# Patient Record
Sex: FEMALE | Race: WHITE | NOT HISPANIC OR LATINO | Employment: FULL TIME | URBAN - METROPOLITAN AREA
[De-identification: names, ages, dates, MRNs, and addresses within clinical notes are randomized per-mention and may not be internally consistent; named-entity substitution may affect disease eponyms.]

---

## 2017-01-24 RX ORDER — GABAPENTIN 300 MG/1
CAPSULE ORAL
Qty: 240 CAPSULE | Refills: 2 | Status: SHIPPED | OUTPATIENT
Start: 2017-01-24 | End: 2017-03-24 | Stop reason: DRUGHIGH

## 2017-03-24 RX ORDER — GABAPENTIN 600 MG/1
600 TABLET ORAL 4 TIMES DAILY
Qty: 120 TABLET | Refills: 5 | Status: SHIPPED | OUTPATIENT
Start: 2017-03-24 | End: 2017-05-30 | Stop reason: SDUPTHER

## 2017-03-24 RX ORDER — SUMATRIPTAN 6 MG/.5ML
6 INJECTION, SOLUTION SUBCUTANEOUS ONCE AS NEEDED
Qty: 0.5 ML | Refills: 2 | Status: SHIPPED | OUTPATIENT
Start: 2017-03-24 | End: 2018-06-14 | Stop reason: SDUPTHER

## 2017-03-24 RX ORDER — TOPIRAMATE 50 MG/1
50 TABLET, FILM COATED ORAL 2 TIMES DAILY
Qty: 60 TABLET | Refills: 2 | Status: SHIPPED | OUTPATIENT
Start: 2017-03-24 | End: 2017-05-30 | Stop reason: SDUPTHER

## 2017-05-24 RX ORDER — CEFUROXIME AXETIL 250 MG/1
TABLET ORAL
Qty: 0.5 ML | Refills: 0 | Status: SHIPPED | OUTPATIENT
Start: 2017-05-24 | End: 2018-06-14 | Stop reason: SDUPTHER

## 2017-05-30 ENCOUNTER — OFFICE VISIT (OUTPATIENT)
Dept: NEUROLOGY | Facility: CLINIC | Age: 42
End: 2017-05-30

## 2017-05-30 VITALS
DIASTOLIC BLOOD PRESSURE: 72 MMHG | WEIGHT: 164 LBS | RESPIRATION RATE: 18 BRPM | SYSTOLIC BLOOD PRESSURE: 118 MMHG | BODY MASS INDEX: 32.2 KG/M2 | HEIGHT: 60 IN

## 2017-05-30 DIAGNOSIS — G43.019 COMMON MIGRAINE WITH INTRACTABLE MIGRAINE: Primary | ICD-10-CM

## 2017-05-30 PROCEDURE — 99214 OFFICE O/P EST MOD 30 MIN: CPT | Performed by: PSYCHIATRY & NEUROLOGY

## 2017-05-30 RX ORDER — TOPIRAMATE 50 MG/1
50 TABLET, FILM COATED ORAL 2 TIMES DAILY
Qty: 180 TABLET | Refills: 3 | Status: SHIPPED | OUTPATIENT
Start: 2017-05-30 | End: 2018-06-08 | Stop reason: SDUPTHER

## 2017-05-30 RX ORDER — GABAPENTIN 600 MG/1
TABLET ORAL
Qty: 360 TABLET | Refills: 3 | Status: SHIPPED | OUTPATIENT
Start: 2017-05-30 | End: 2017-12-22 | Stop reason: SDUPTHER

## 2017-05-30 RX ORDER — SUMATRIPTAN 100 MG/1
TABLET, FILM COATED ORAL
Qty: 9 TABLET | Refills: 11 | Status: SHIPPED | OUTPATIENT
Start: 2017-05-30 | End: 2018-06-04 | Stop reason: SDUPTHER

## 2017-08-24 ENCOUNTER — TELEPHONE (OUTPATIENT)
Dept: NEUROLOGY | Facility: CLINIC | Age: 42
End: 2017-08-24

## 2017-08-24 NOTE — TELEPHONE ENCOUNTER
----- Message from Reshma Alonzo MD sent at 8/23/2017  4:30 PM EDT -----  Contact: JESUS  Accidentally routed letter for pt to [place]. Pls check that it goes out -- thanks.  ----- Message -----     From: Renee Chen MA     Sent: 8/22/2017   1:12 PM       To: Reshma Alonzo MD        ----- Message -----     From: Herminia Mann     Sent: 8/22/2017  11:17 AM       To: [facility]    OSCAR    Shannon called to see if Dr. Alonzo could write a letter indicating that a stand up desk would be better for her migraines in the fact that it helps her posture, etc.  She said it doesn't have to be elaborate or specific.    Please call her to confirm. [phone]   ______________________________________________________     I spoke with pt. Informed her that I mailed letter to her [place] address on file.   -TMT 8/24/17

## 2017-12-22 RX ORDER — GABAPENTIN 600 MG/1
TABLET ORAL
Qty: 360 TABLET | Refills: 1 | OUTPATIENT
Start: 2017-12-22 | End: 2018-06-14 | Stop reason: SDUPTHER

## 2018-06-04 RX ORDER — SUMATRIPTAN 100 MG/1
TABLET, FILM COATED ORAL
Qty: 9 TABLET | Refills: 0 | Status: SHIPPED | OUTPATIENT
Start: 2018-06-04 | End: 2018-06-14 | Stop reason: SDUPTHER

## 2018-06-08 RX ORDER — TOPIRAMATE 50 MG/1
TABLET, FILM COATED ORAL
Qty: 180 TABLET | Refills: 3 | Status: SHIPPED | OUTPATIENT
Start: 2018-06-08 | End: 2018-06-14 | Stop reason: SDUPTHER

## 2018-06-14 ENCOUNTER — OFFICE VISIT (OUTPATIENT)
Dept: NEUROLOGY | Facility: CLINIC | Age: 43
End: 2018-06-14

## 2018-06-14 VITALS
HEIGHT: 60 IN | SYSTOLIC BLOOD PRESSURE: 124 MMHG | DIASTOLIC BLOOD PRESSURE: 76 MMHG | WEIGHT: 145 LBS | BODY MASS INDEX: 28.47 KG/M2

## 2018-06-14 DIAGNOSIS — G43.719 INTRACTABLE CHRONIC MIGRAINE WITHOUT AURA AND WITHOUT STATUS MIGRAINOSUS: Primary | ICD-10-CM

## 2018-06-14 PROCEDURE — 99213 OFFICE O/P EST LOW 20 MIN: CPT | Performed by: PSYCHIATRY & NEUROLOGY

## 2018-06-14 RX ORDER — GABAPENTIN 600 MG/1
1200 TABLET ORAL 2 TIMES DAILY
Qty: 360 TABLET | Refills: 1 | OUTPATIENT
Start: 2018-06-14 | End: 2018-11-27 | Stop reason: SDUPTHER

## 2018-06-14 RX ORDER — TOPIRAMATE 50 MG/1
50 TABLET, FILM COATED ORAL 2 TIMES DAILY
Qty: 180 TABLET | Refills: 3 | Status: SHIPPED | OUTPATIENT
Start: 2018-06-14 | End: 2019-02-22 | Stop reason: SDUPTHER

## 2018-06-14 RX ORDER — SUMATRIPTAN 6 MG/.5ML
6 INJECTION, SOLUTION SUBCUTANEOUS ONCE AS NEEDED
Qty: 0.5 ML | Refills: 2 | Status: SHIPPED | OUTPATIENT
Start: 2018-06-14 | End: 2019-12-20 | Stop reason: SDUPTHER

## 2018-06-14 RX ORDER — SUMATRIPTAN 100 MG/1
TABLET, FILM COATED ORAL
Qty: 9 TABLET | Refills: 11 | Status: SHIPPED | OUTPATIENT
Start: 2018-06-14 | End: 2018-11-27 | Stop reason: SDUPTHER

## 2018-06-14 NOTE — PROGRESS NOTES
Subjective   Molly Ferguson is a 43 y.o. female.     Chief Complaint   Patient presents with   • Common migraine with intractable migraine       History of Present Illness     Pt originally seen 4/14 for headaches. Started after second child, severe pain on left side, primarily periorbital. Has P/P, sometimes N/V, and somewhat helped by sleep, ice pack and quiet. Some a/w menstrual periods. Takes ibuprofen for individual headache.  No aura. Were 2-3/week. Became incapacitating. Developed blurry vision in right eye, not coinciding with headaches.  Previous prophylactic medication has included TPM 75mg bid, used to help, less over time and tried Inderal LA 80 mg. Latter was no help. Added TPM back in and no better.  Maxalt was no help. Bite guard not helpful.  Brain MRI normal.  Started amitriptyline, initially helpful, then less so, changed to Fetzima, initially helpful, but N/V when decreased effectiveness and bumped to 80mg; then GBP 300mg tabs.  Then reported decrease to 2-3 headaches/week. On GBP 600mg qAM and 1200mg qHS as well as TPM 50mg BID and tolerating it well. She takes Imitrex around twice a week and this is helpful as well.   10/16: GBP increased to 900/1200, and migraines down to 1 or 2 a week. They were less frequent than that, but now picking up again with stress. Tolerating the medication without trouble. imitrex injections helpful. Has done ok with steroids in the past.  5/17: had hysterectomy, but didn't have to have oophorectomy. Then had the flu, then 6 day migraines. Now usually just having 1-2 HAs around days of cycle.  Not getting both imitrex pills and injections without a PA, so has no pills currently. Feels the combination of TPM 50mg bid and GBP 2x 600mg bid works well.   Today: continues to have migraines clustered once/month, suspects time of periods, but some random ones as well. Last really bad/incapacitating migraine about 2 mos ago. No change in character. If starts on left, knows  "it is a migraine, but \"regular\" headache can also turn into migraine. Has no injections because requires PA to get both sumatriptan pill and injection. No change in TPM and GBP, tolerated well.   Had enlarged groin LN in January, had CT, abx.     Allergies   Allergen Reactions   • Codeine    • Penicillins        Current Outpatient Prescriptions on File Prior to Visit   Medication Sig Dispense Refill   • DULoxetine (CYMBALTA) 60 MG capsule   0   • [DISCONTINUED] gabapentin (NEURONTIN) 600 MG tablet take 2 tablets by mouth twice a day 360 tablet 1   • [DISCONTINUED] SUMAtriptan (IMITREX) 100 MG tablet take 1 tablet by mouth AT ONSET OF MIGRAINE, may repeat ONCE 9 tablet 0   • [DISCONTINUED] topiramate (TOPAMAX) 50 MG tablet take 1 tablet by mouth twice a day 180 tablet 3   • [DISCONTINUED] FLUoxetine (PROzac) 40 MG capsule Take by mouth daily     • [DISCONTINUED] simvastatin (ZOCOR) 20 MG tablet Take by mouth daily     • [DISCONTINUED] SUMAtriptan (IMITREX) 6 MG/0.5ML solution injection Inject 0.5 mL under the skin 1 (One) Time As Needed for migraine for up to 5 doses. 0.5 mL 2   • [DISCONTINUED] SUMAtriptan Succinate 6 MG/0.5ML solution auto-injector inject 1 dose subcutaneously if needed for migraines may repeat in 1 hour IF migraines PERSISTS UP TO 5 DOSES 0.5 mL 0     No current facility-administered medications on file prior to visit.        Past Medical History:   Diagnosis Date   • History of anxiety    • History of hyperlipidemia    • Migraines    • Visual disturbances        Past Surgical History:   Procedure Laterality Date   • HYSTERECTOMY     • TUBAL ABDOMINAL LIGATION         Social History     Social History   • Marital status:      Spouse name: N/A   • Number of children: N/A   • Years of education: N/A     Occupational History   • Not on file.     Social History Main Topics   • Smoking status: Former Smoker   • Smokeless tobacco: Not on file   • Alcohol use Yes      Comment: socially   • Drug " "use: Unknown   • Sexual activity: Not on file     Other Topics Concern   • Not on file     Social History Narrative   • No narrative on file       Review of Systems   Constitutional: Negative for fever and unexpected weight change.        Has intentionally lost 20 lb, eating well and exercising   Respiratory: Negative for cough and shortness of breath.    Cardiovascular: Negative for chest pain.   Neurological: Positive for headaches. Negative for weakness.       Objective   Blood pressure 124/76, height 152.4 cm (60\"), weight 65.8 kg (145 lb).    Physical Exam   Constitutional: She is oriented to person, place, and time. She appears well-developed and well-nourished.   HENT:   Head: Normocephalic and atraumatic.   Eyes: EOM are normal. Pupils are equal, round, and reactive to light.   Pulmonary/Chest: Effort normal.   Neurological: She is oriented to person, place, and time. She has a normal Finger-Nose-Finger Test and a normal Heel to Shin Test. Gait normal.   Skin: Skin is warm and dry.   Psychiatric: She has a normal mood and affect. Her speech is normal and behavior is normal. Judgment and thought content normal.   Nursing note and vitals reviewed.      Neurologic Exam     Mental Status   Oriented to person, place, and time.   Speech: speech is normal   Level of consciousness: alert  Knowledge: consistent with education.   Able to name object. Normal comprehension.     Cranial Nerves     CN II   Visual fields full to confrontation.     CN III, IV, VI   Pupils are equal, round, and reactive to light.  Extraocular motions are normal.     CN VII   Facial expression full, symmetric.     CN IX, X   CN IX normal.   CN X normal.   Palate: symmetric    CN XI   CN XI normal.     CN XII   CN XII normal.     Motor Exam   Muscle bulk: normal  Right arm pronator drift: absent  Left arm pronator drift: absent    Strength   Strength 5/5 except as noted.     Sensory Exam   Light touch normal.     Gait, Coordination, and " Reflexes     Gait  Gait: normal    Coordination   Finger to nose coordination: normal  Heel to shin coordination: normal    Tremor   Resting tremor: absent  Intention tremor: absent  Action tremor: absent      Assessment/Plan     Molly was seen today for common migraine with intractable migraine.    Diagnoses and all orders for this visit:    Intractable chronic migraine without aura and without status migrainosus    Other orders  -     SUMAtriptan (IMITREX) 6 MG/0.5ML solution injection; Inject 0.5 mL under the skin 1 (One) Time As Needed for Migraine for up to 5 doses.  -     SUMAtriptan (IMITREX) 100 MG tablet; take 1 tablet by mouth AT ONSET OF MIGRAINE, may repeat ONCE  -     topiramate (TOPAMAX) 50 MG tablet; Take 1 tablet by mouth 2 (Two) Times a Day.  -     gabapentin (NEURONTIN) 600 MG tablet; Take 2 tablets by mouth 2 (Two) Times a Day.      Discussion/Summary:  Migraines remain intractable, but under better control than in past, and meds well tolerated. Discussed prognosis, headache classification, medications. Will plan PA to get injection approved, given intermittent incapacitating migraines that do not respond to med, or are full blown before she can take tablet.  Return in about 1 year (around 6/14/2019).

## 2018-06-15 RX ORDER — GABAPENTIN 600 MG/1
TABLET ORAL
Qty: 360 TABLET | Refills: 1 | OUTPATIENT
Start: 2018-06-15

## 2018-11-05 ENCOUNTER — TELEPHONE (OUTPATIENT)
Dept: NEUROLOGY | Facility: CLINIC | Age: 43
End: 2018-11-05

## 2018-11-05 NOTE — TELEPHONE ENCOUNTER
----- Message from Elder Avila sent at 11/5/2018 11:22 AM EST -----  Contact: PT  OSCAR:    MIGRAINES HAVE BECOME VERY FREQUENT AND WANTS TO HAVE SOME DIRECTION ON HOW TO HANDLE OR WHAT TO DO. HAS HAD TO MISS WORK DUE TO SEVERITY.    CALLBACK:[phone]

## 2018-11-06 NOTE — TELEPHONE ENCOUNTER
Probably needs to come in to discuss since it's been almost 6 mos since last seen -- can see me or anyone else in office if they are OK with it (eg Dr Del Valle if he has room, or Karla).

## 2018-11-07 NOTE — TELEPHONE ENCOUNTER
CHRISTINM for pt notifying her of Dr. Alonzo's instruction, to call back and schedule follow up appointment.

## 2018-11-28 RX ORDER — SUMATRIPTAN 100 MG/1
100 TABLET, FILM COATED ORAL ONCE AS NEEDED
Qty: 9 TABLET | Refills: 0 | Status: SHIPPED | OUTPATIENT
Start: 2018-11-28 | End: 2018-12-26 | Stop reason: SDUPTHER

## 2018-11-28 RX ORDER — GABAPENTIN 600 MG/1
1200 TABLET ORAL 2 TIMES DAILY
Qty: 360 TABLET | Refills: 1 | OUTPATIENT
Start: 2018-11-28 | End: 2019-02-22 | Stop reason: SDUPTHER

## 2018-12-26 ENCOUNTER — TELEPHONE (OUTPATIENT)
Dept: NEUROLOGY | Facility: CLINIC | Age: 43
End: 2018-12-26

## 2018-12-26 NOTE — TELEPHONE ENCOUNTER
Called script in as should have been called in on 11/27/18. Spoke to pharmacy and they never received . Advised patient that Gabapentin 600 had been called in

## 2018-12-26 NOTE — TELEPHONE ENCOUNTER
----- Message from Elder Ochoa Rep sent at 12/26/2018  9:25 AM EST -----  [place]     PT called to request refill on medication: gabapentin (NEURONTIN) 600 MG tablet [[id number]] (Order [id number]).  PT [facility]: [facility], [place] - [address] [facility] [address] - [phone] [facility] [phone] FX.    Please be aware that PT will be completely out of the medication in a few days.     Please call [place] back:  [phone]

## 2018-12-27 RX ORDER — SUMATRIPTAN 100 MG/1
TABLET, FILM COATED ORAL
Qty: 9 TABLET | Refills: 0 | Status: SHIPPED | OUTPATIENT
Start: 2018-12-27 | End: 2019-01-23 | Stop reason: SDUPTHER

## 2019-01-23 RX ORDER — SUMATRIPTAN 100 MG/1
TABLET, FILM COATED ORAL
Qty: 9 TABLET | Refills: 0 | Status: SHIPPED | OUTPATIENT
Start: 2019-01-23 | End: 2019-02-14 | Stop reason: SDUPTHER

## 2019-02-14 RX ORDER — SUMATRIPTAN 100 MG/1
TABLET, FILM COATED ORAL
Qty: 9 TABLET | Refills: 0 | Status: SHIPPED | OUTPATIENT
Start: 2019-02-14 | End: 2019-02-22 | Stop reason: SDUPTHER

## 2019-02-22 ENCOUNTER — OFFICE VISIT (OUTPATIENT)
Dept: NEUROLOGY | Facility: CLINIC | Age: 44
End: 2019-02-22

## 2019-02-22 VITALS
SYSTOLIC BLOOD PRESSURE: 132 MMHG | BODY MASS INDEX: 28.7 KG/M2 | HEIGHT: 60 IN | DIASTOLIC BLOOD PRESSURE: 76 MMHG | WEIGHT: 146.2 LBS

## 2019-02-22 DIAGNOSIS — G43.719 INTRACTABLE CHRONIC MIGRAINE WITHOUT AURA AND WITHOUT STATUS MIGRAINOSUS: Primary | ICD-10-CM

## 2019-02-22 PROCEDURE — 99213 OFFICE O/P EST LOW 20 MIN: CPT | Performed by: PSYCHIATRY & NEUROLOGY

## 2019-02-22 RX ORDER — TOPIRAMATE 50 MG/1
50 TABLET, FILM COATED ORAL 2 TIMES DAILY
Qty: 180 TABLET | Refills: 1 | Status: SHIPPED | OUTPATIENT
Start: 2019-02-22 | End: 2019-07-03

## 2019-02-22 RX ORDER — DEXAMETHASONE 1 MG
1 TABLET ORAL 2 TIMES DAILY WITH MEALS
Qty: 4 TABLET | Refills: 0 | Status: SHIPPED | OUTPATIENT
Start: 2019-02-22 | End: 2019-02-24

## 2019-02-22 RX ORDER — GABAPENTIN 600 MG/1
1200 TABLET ORAL 2 TIMES DAILY
Qty: 360 TABLET | Refills: 1 | OUTPATIENT
Start: 2019-02-22 | End: 2019-07-01 | Stop reason: SDUPTHER

## 2019-02-22 RX ORDER — SUMATRIPTAN 100 MG/1
100 TABLET, FILM COATED ORAL ONCE AS NEEDED
Qty: 9 TABLET | Refills: 3 | Status: SHIPPED | OUTPATIENT
Start: 2019-02-22 | End: 2019-05-06 | Stop reason: SDUPTHER

## 2019-02-22 RX ORDER — DEXAMETHASONE 1.5 MG/1
1.5 TABLET ORAL 2 TIMES DAILY WITH MEALS
Qty: 4 TABLET | Refills: 0 | Status: SHIPPED | OUTPATIENT
Start: 2019-02-22 | End: 2019-02-24

## 2019-02-22 RX ORDER — DEXAMETHASONE 1 MG
0.5 TABLET ORAL 2 TIMES DAILY WITH MEALS
Qty: 2 TABLET | Refills: 0 | Status: SHIPPED | OUTPATIENT
Start: 2019-02-22 | End: 2019-02-24

## 2019-02-22 NOTE — PROGRESS NOTES
"Subjective:    CC: Molly Ferguson is seen today in consultation at the request of No ref. provider found for Migraine       HPI:  ***    The following portions of the patient's history were reviewed today and updated as of 02/22/2019  : {history reviewed:10808::\"allergies\",\"social history\",\"problem list\"}.  This document will be scanned to patient's chart.      Current Outpatient Medications:   •  DULoxetine (CYMBALTA) 60 MG capsule, , Disp: , Rfl: 0  •  gabapentin (NEURONTIN) 600 MG tablet, Take 2 tablets by mouth 2 (Two) Times a Day., Disp: 360 tablet, Rfl: 1  •  SUMAtriptan (IMITREX) 100 MG tablet, TAKE ONE TABLET BY MOUTH AT ONSET OF HEADACHE; MAY REPEAT ONE TABLET IN 2 HOURS IF NEEDED. MAX OF 200MG A DAY, Disp: 9 tablet, Rfl: 0  •  SUMAtriptan (IMITREX) 6 MG/0.5ML solution injection, Inject 0.5 mL under the skin 1 (One) Time As Needed for Migraine for up to 5 doses., Disp: 0.5 mL, Rfl: 2  •  topiramate (TOPAMAX) 50 MG tablet, Take 1 tablet by mouth 2 (Two) Times a Day., Disp: 180 tablet, Rfl: 3   Past Medical History:   Diagnosis Date   • History of anxiety    • History of hyperlipidemia    • Migraines    • Visual disturbances       Past Surgical History:   Procedure Laterality Date   • HYSTERECTOMY     • TUBAL ABDOMINAL LIGATION        Family History   Problem Relation Age of Onset   • Hypertension Father    • Heart disease Father    • Diabetes Father    • Hypertension Maternal Grandmother    • Breast cancer Maternal Grandmother    • Alzheimer's disease Other    • Parkinsonism Other         Parkinsons Disease   • Heart disease Other         Congenital   • Cancer Other       Review of Systems   Constitutional: Positive for activity change, fatigue, unexpected weight gain and unexpected weight loss.   Musculoskeletal: Positive for neck pain and neck stiffness.   Neurological: Positive for headache.   Psychiatric/Behavioral: Positive for sleep disturbance. The patient is nervous/anxious.        All other systems " reviewed and are negative     Objective:    There were no vitals taken for this visit.    Neurology Exam:    General apperance: NAD.     Mental status: Alert, awake and oriented to time place and person.    Recent and Remote memory: Can recall 3/3 objects at 5 minutes. Can recall historical events.     Attention span and Concentration: Serial 7s: Normal.     Fund of knowledge:  Normal.     Language and Speech: No aphasia or dysarthria.    Naming , Repitition and Comprehension:  Can name objects, repeat a sentence and follow commands. Speech is clear and fluent with good repetition, comprehension, and naming.    Cranial Nerves:   CN II: Visual fields are full. Intact. Fundi - Normal, No papillederma, Pupils - MARA  CN III, IV and VI: Extraocular movements are intact. Normal saccades.   CN V: Facial sensation is intact.   CN VII: Muscles of facial expression reveal no asymmetry. Intact.   CN VIII: Hearing is intact. Whispered voice intact.   CN IX and X: Palate elevates symmetrically. Intact  CN XI: Shoulder shrug is intact.   CN XII: Tongue is midline without evidence of atrophy or fasciculation.     Motor:  Right UE muscle strength 5/5. Normal tone.     Left UE muscle strength 5/5. Normal tone.      Right LE muscle strength5/5. Normal tone.     Left LE muscle strength 5/5. Normal tone.      Sensory: Normal light touch, vibration and pinprick sensation bilaterally.    DTRs: 2+ bilaterally in upper and lower extremities.    Babinski: Negative bilaterally.    Co-ordination: Normal finger-to-nose, heel to shin B/L.    Rhomberg: Negative.    Gait: Normal.    Cardiovascular: Regular rate and rhythm without murmur, gallop or rub.    Assessment and Plan:  There are no diagnoses linked to this encounter.     No Follow-up on file.     Praneeth Childers MA

## 2019-02-22 NOTE — PROGRESS NOTES
Subjective:    CC: Molly Ferguson is in clinic today for follow up for intractable chronic migraines.    HPI:      Current visit: Patient is in clinic for regular follow-up.  She is a patient of Dr. Alonzo and is being followed for intractable chronic migraines.  She saw Dr. Alonzo in June 2018 and at that time she was experiencing about 6-8 migraines in a month.  Couple of months later, she reports that the headache frequency and intensity have become worse.  She reports that currently she is experiencing approximately 15-20 headaches in a month.  She reports that in the last 6 days, she has had headache almost on a daily basis.  She has been taking ibuprofen 600-800 mg almost on a daily basis for last 4-5 weeks.  Typically migraine involves left frontotemporal region associated with light and sound sensitivity as well as nausea and vomiting.  She reports pain quality is dull throbbing type of pain with maximum pain intensity being 6-7 out of 10.  Currently, she is on Topamax 50 mg twice daily, gabapentin 600 mg twice daily and Cymbalta 60 mg daily.  This combination has not helped.  She has also failed amitriptyline and propranolol in the past.    Past history: Pt originally seen 4/14 for headaches. Started after second child, severe pain on left side, primarily periorbital. Has P/P, sometimes N/V, and somewhat helped by sleep, ice pack and quiet. Some a/w menstrual periods. Takes ibuprofen for individual headache.  No aura. Were 2-3/week. Became incapacitating. Developed blurry vision in right eye, not coinciding with headaches.  Previous prophylactic medication has included TPM 75mg bid, used to help, less over time and tried Inderal LA 80 mg. Latter was no help. Added TPM back in and no better.  Maxalt was no help. Bite guard not helpful.  Brain MRI normal.  Started amitriptyline, initially helpful, then less so, changed to Fetzima, initially helpful, but N/V when decreased effectiveness and bumped to  "80mg; then GBP 300mg tabs.  Then reported decrease to 2-3 headaches/week. On GBP 600mg qAM and 1200mg qHS as well as TPM 50mg BID and tolerating it well. She takes Imitrex around twice a week and this is helpful as well.   10/16: GBP increased to 900/1200, and migraines down to 1 or 2 a week. They were less frequent than that, but now picking up again with stress. Tolerating the medication without trouble. imitrex injections helpful. Has done ok with steroids in the past.  5/17: had hysterectomy, but didn't have to have oophorectomy. Then had the flu, then 6 day migraines. Now usually just having 1-2 HAs around days of cycle.  Not getting both imitrex pills and injections without a PA, so has no pills currently. Feels the combination of TPM 50mg bid and GBP 2x 600mg bid works well.   6/18: continues to have migraines clustered once/month, suspects time of periods, but some random ones as well. Last really bad/incapacitating migraine about 2 mos ago. No change in character. If starts on left, knows it is a migraine, but \"regular\" headache can also turn into migraine. Has no injections because requires PA to get both sumatriptan pill and injection. No change in TPM and GBP, tolerated well.   Had enlarged groin LN in January, had CT, abx.     The following portions of the patient's history were reviewed and updated as of 02/22/2019: allergies, social history and problem list.       Current Outpatient Medications:   •  gabapentin (NEURONTIN) 600 MG tablet, Take 2 tablets by mouth 2 (Two) Times a Day., Disp: 360 tablet, Rfl: 1  •  SUMAtriptan (IMITREX) 100 MG tablet, TAKE ONE TABLET BY MOUTH AT ONSET OF HEADACHE; MAY REPEAT ONE TABLET IN 2 HOURS IF NEEDED. MAX OF 200MG A DAY, Disp: 9 tablet, Rfl: 0  •  SUMAtriptan (IMITREX) 6 MG/0.5ML solution injection, Inject 0.5 mL under the skin 1 (One) Time As Needed for Migraine for up to 5 doses., Disp: 0.5 mL, Rfl: 2  •  topiramate (TOPAMAX) 50 MG tablet, Take 1 tablet by mouth 2 " "(Two) Times a Day., Disp: 180 tablet, Rfl: 3  •  Vortioxetine HBr (TRINTELLIX) 20 MG tablet, Take  by mouth., Disp: , Rfl:   •  DULoxetine (CYMBALTA) 60 MG capsule, , Disp: , Rfl: 0   Past Medical History:   Diagnosis Date   • History of anxiety    • History of hyperlipidemia    • Migraines    • Visual disturbances       Past Surgical History:   Procedure Laterality Date   • HYSTERECTOMY     • TUBAL ABDOMINAL LIGATION        Family History   Problem Relation Age of Onset   • Hypertension Father    • Heart disease Father    • Diabetes Father    • Hypertension Maternal Grandmother    • Breast cancer Maternal Grandmother    • Alzheimer's disease Other    • Parkinsonism Other         Parkinsons Disease   • Heart disease Other         Congenital   • Cancer Other         Review of Systems   Neurological: Positive for headache.     Objective:    /76   Ht 152.4 cm (60\")   Wt 66.3 kg (146 lb 3.2 oz)   BMI 28.55 kg/m²     Neurology Exam:  General apperance: NAD.     Mental status: Alert, awake and oriented to time place and person.    Recent and Remote memory: Can recall 3/3 objects at 5 minutes. Can recall historical events.     Attention span and Concentration: Serial 7s: Normal.     Fund of knowledge:  Normal.     Language and Speech: No aphasia or dysarthria.    Naming , Repitition and Comprehension:  Can name objects, repeat a sentence and follow commands. Speech is clear and fluent with good repetition, comprehension, and naming.    CN II to XII: Intact.    Opthalmoscopic Exam: No papilledema.    Motor:  Right UE muscle strength 5/5. Normal tone.     Left UE muscle strength 5/5. Normal tone.      Right LE muscle strength5/5. Normal tone.     Left LE muscle strength 5/5. Normal tone.      Sensory: Normal light touch, vibration and pinprick sensation bilaterally.    DTRs: 2+ bilaterally.    Babinski: Negative bilaterally.    Co-ordination: Normal finger-to-nose, heel to shin B/L.    Rhomberg: Negative.    Gait: " Normal.    Cardiovascular: Regular rate and rhythm without murmur, gallop or rub.    Assessment and Plan:  1. Intractable chronic migraine without aura and without status migrainosus  She is reporting increasing severity and frequency of migraine headaches in last few weeks.  She has likely developed rebound headaches in addition to migraines caused by overuse of ibuprofen.  I will be prescribing her dexamethasone and a tapering dose to break the cycle of headaches.  Current headache frequency is approximately 15-20 headache days in a month.  Have advised her not to use ibuprofen or Imitrex more than twice a week.  Since she has failed multiple migraine preventative therapies including Neurontin, Topamax, amitriptyline, propranolol and Cymbalta, I will be starting her on Aimovig 140 mg subcutaneous injection to be taken every month as a preventative therapy.  First injection of 140 mg Aimovig was given today in the clinic ( free sample).  If she starts responding to Aimovig then Topamax and gabapentin will be slowly tapered off in the next visits.  I will see her back in 4 weeks in follow-up.       I spent 15 minutes face to face with the patient and spent 10 minutes of this time counseling and discussing about taking medication regularly, possible side effects with medication use, importance of good sleep hygiene, good hydration and regular exercise.    No Follow-up on file.

## 2019-02-25 ENCOUNTER — TELEPHONE (OUTPATIENT)
Dept: NEUROLOGY | Facility: CLINIC | Age: 44
End: 2019-02-25

## 2019-02-25 NOTE — TELEPHONE ENCOUNTER
Patient has not been able to use the co-pay card for Aimovig yet. Advised pt that Insurance has denied it, but she should be able to use the co-pay card for up to 12 months with it.  Advised pt to give it a try and then give me a call back.    Per insurance Aimovig is only covered when pt treatment cannot be switched to another miragaine treatment ( Ajovy or Emagilty.)

## 2019-03-28 ENCOUNTER — OFFICE VISIT (OUTPATIENT)
Dept: NEUROLOGY | Facility: CLINIC | Age: 44
End: 2019-03-28

## 2019-03-28 VITALS
WEIGHT: 147 LBS | HEIGHT: 60 IN | OXYGEN SATURATION: 98 % | HEART RATE: 68 BPM | DIASTOLIC BLOOD PRESSURE: 82 MMHG | SYSTOLIC BLOOD PRESSURE: 126 MMHG | BODY MASS INDEX: 28.86 KG/M2

## 2019-03-28 DIAGNOSIS — G43.719 INTRACTABLE CHRONIC MIGRAINE WITHOUT AURA AND WITHOUT STATUS MIGRAINOSUS: Primary | ICD-10-CM

## 2019-03-28 PROCEDURE — 99213 OFFICE O/P EST LOW 20 MIN: CPT | Performed by: PSYCHIATRY & NEUROLOGY

## 2019-03-28 NOTE — PROGRESS NOTES
Subjective:    CC: Molly Ferguson is in clinic today for follow up for intractable chronic migraines.    HPI:  She is in clinic for regular follow-up.  Since her last visit, she reports that she has had total of 2 injections of Aimovig and reports that she has done very well.  Headache intensity and frequency has improved significantly.  She has a total of 3 headaches since starting him awake for which she took Imitrex.  She does report constipation but overall she is extremely happy with the results with Aimovig use.  Before a more week, her headache frequency was 15-20 headache days in a month.  She is currently also on gabapentin and Topamax which was started for headache prevention as well.    The following portions of the patient's history were reviewed and updated as of 03/28/2019: allergies, social history and problem list.       Current Outpatient Medications:   •  DULoxetine (CYMBALTA) 60 MG capsule, , Disp: , Rfl: 0  •  gabapentin (NEURONTIN) 600 MG tablet, Take 2 tablets by mouth 2 (Two) Times a Day., Disp: 360 tablet, Rfl: 1  •  SUMAtriptan (IMITREX) 100 MG tablet, Take 1 tablet by mouth 1 (One) Time As Needed for Migraine for up to 1 dose. ., Disp: 9 tablet, Rfl: 3  •  SUMAtriptan (IMITREX) 6 MG/0.5ML solution injection, Inject 0.5 mL under the skin 1 (One) Time As Needed for Migraine for up to 5 doses., Disp: 0.5 mL, Rfl: 2  •  topiramate (TOPAMAX) 50 MG tablet, Take 1 tablet by mouth 2 (Two) Times a Day., Disp: 180 tablet, Rfl: 1  •  Vortioxetine HBr (TRINTELLIX) 20 MG tablet, Take  by mouth., Disp: , Rfl:    Past Medical History:   Diagnosis Date   • History of anxiety    • History of hyperlipidemia    • Migraines    • Visual disturbances       Past Surgical History:   Procedure Laterality Date   • HYSTERECTOMY     • TUBAL ABDOMINAL LIGATION        Family History   Problem Relation Age of Onset   • Hypertension Father    • Heart disease Father    • Diabetes Father    • Hypertension Maternal  "Grandmother    • Breast cancer Maternal Grandmother    • Alzheimer's disease Other    • Parkinsonism Other         Parkinsons Disease   • Heart disease Other         Congenital   • Cancer Other         Review of Systems   Gastrointestinal: Positive for constipation.   Neurological: Positive for weakness, light-headedness and headache.   All other systems reviewed and are negative.    Objective:    /82 (BP Location: Right arm, Patient Position: Sitting, Cuff Size: Adult)   Pulse 68   Ht 152.4 cm (60\")   Wt 66.7 kg (147 lb)   SpO2 98%   BMI 28.71 kg/m²     Neurology Exam:  General apperance: NAD.     Mental status: Alert, awake and oriented to time place and person.    Recent and Remote memory: Can recall 3/3 objects at 5 minutes. Can recall historical events.     Attention span and Concentration: Serial 7s: Normal.     Fund of knowledge:  Normal.     Language and Speech: No aphasia or dysarthria.    Naming , Repitition and Comprehension:  Can name objects, repeat a sentence and follow commands. Speech is clear and fluent with good repetition, comprehension, and naming.    CN II to XII: Intact.    Opthalmoscopic Exam: No papilledema.    Motor:  Right UE muscle strength 5/5. Normal tone.     Left UE muscle strength 5/5. Normal tone.      Right LE muscle strength5/5. Normal tone.     Left LE muscle strength 5/5. Normal tone.      Sensory: Normal light touch, vibration and pinprick sensation bilaterally.    DTRs: 2+ bilaterally.    Babinski: Negative bilaterally.    Co-ordination: Normal finger-to-nose, heel to shin B/L.    Rhomberg: Negative.    Gait: Normal.    Cardiovascular: Regular rate and rhythm without murmur, gallop or rub.    Assessment and Plan:  1. Intractable chronic migraine without aura and without status migrainosus  Doing very well with Aimovig 140 mg subcutaneous monthly injection.  She has a total of 3 headaches in last 1 month after the first injection.  She had a second injection last " week.  She does report constipation with Aimovig use.  She wants to continue with Topamax and gabapentin for now.  I will see her back in 3 months and at that time, either gabapentin or Topamax can be tapered off.    I spent 15 minutes face to face with the patient and spent 10 minutes of this time  in management, instructions and education regarding above mentioned diagnosis and also on counseling and discussing about taking medication regularly, possible side effects with medication use, importance of good sleep hygiene, good hydration and regular exercise.    Return in about 3 months (around 6/28/2019).

## 2019-05-07 RX ORDER — SUMATRIPTAN 100 MG/1
TABLET, FILM COATED ORAL
Qty: 9 TABLET | Refills: 0 | Status: SHIPPED | OUTPATIENT
Start: 2019-05-07 | End: 2019-06-07 | Stop reason: SDUPTHER

## 2019-05-10 ENCOUNTER — TELEPHONE (OUTPATIENT)
Dept: NEUROLOGY | Facility: CLINIC | Age: 44
End: 2019-05-10

## 2019-05-10 NOTE — TELEPHONE ENCOUNTER
Patient insurance would like her to try Ajovy and Emagilty. So we just need a RX for one of those for insurance purposes.

## 2019-05-20 ENCOUNTER — TELEPHONE (OUTPATIENT)
Dept: NEUROLOGY | Facility: CLINIC | Age: 44
End: 2019-05-20

## 2019-05-20 NOTE — TELEPHONE ENCOUNTER
I have sent orders for Ajovy 225 mg subcutaneous injection.  She can go to [email or web address] and see how to inject and if she is not comfortable doing it herself then she can always stop by our office and someone can do it for her.  She will take Ajovy next whenever she was due for her next Aimovig injection

## 2019-05-21 NOTE — TELEPHONE ENCOUNTER
Informed pt that Aimovig was denied by insurance and we have prescribed Ajovy. Patient acknowledged understanding. Pt will call if she needs any aisstance going forward

## 2019-06-06 ENCOUNTER — TELEPHONE (OUTPATIENT)
Dept: NEUROLOGY | Facility: CLINIC | Age: 44
End: 2019-06-06

## 2019-06-06 NOTE — TELEPHONE ENCOUNTER
As far as she is alternating Imitrex and ibuprofen then it should be okay.  Also many a times with second Ajovy injection, the effect improves and headaches do get better so tell  her to hang in there and hopefully headaches will come under control.

## 2019-06-06 NOTE — TELEPHONE ENCOUNTER
Patient was switched from Aimovig to Ajovy due to insurance purposes. Since then, patient states since her first Ajovy injection on 5/22, she has experienced 4 migraines in a week and is relying on Imitrex and Ibuprofen. I have advised pt that we will send in another PA for Aimovig and submitted this information, but I do have to wait 30 days and that will be on 6/10. Pt acknowledged understanding. Pt would like to know is there anything else she can take other than Imitrex and ibuprofen because she states you don't want her to rely on those.

## 2019-06-06 NOTE — TELEPHONE ENCOUNTER
Informed pt that as long as she is alternating Imitrex and ibuprofen then it should be okay.  Also many a times with second Ajovy injection, the effect improves and headaches do get better so tell  her to hang in there and hopefully headaches will come under control. Pt acknowledged understanding.

## 2019-06-07 RX ORDER — SUMATRIPTAN 100 MG/1
TABLET, FILM COATED ORAL
Qty: 9 TABLET | Refills: 0 | Status: SHIPPED | OUTPATIENT
Start: 2019-06-07 | End: 2019-07-08 | Stop reason: SDUPTHER

## 2019-07-01 RX ORDER — GABAPENTIN 600 MG/1
TABLET ORAL
Qty: 360 TABLET | Refills: 0 | Status: SHIPPED | OUTPATIENT
Start: 2019-07-01 | End: 2019-09-23 | Stop reason: SDUPTHER

## 2019-07-03 ENCOUNTER — OFFICE VISIT (OUTPATIENT)
Dept: NEUROLOGY | Facility: CLINIC | Age: 44
End: 2019-07-03

## 2019-07-03 VITALS
SYSTOLIC BLOOD PRESSURE: 124 MMHG | BODY MASS INDEX: 29.25 KG/M2 | WEIGHT: 149 LBS | DIASTOLIC BLOOD PRESSURE: 76 MMHG | HEIGHT: 60 IN

## 2019-07-03 DIAGNOSIS — G43.719 INTRACTABLE CHRONIC MIGRAINE WITHOUT AURA AND WITHOUT STATUS MIGRAINOSUS: Primary | ICD-10-CM

## 2019-07-03 PROCEDURE — 99214 OFFICE O/P EST MOD 30 MIN: CPT | Performed by: PSYCHIATRY & NEUROLOGY

## 2019-07-03 RX ORDER — TRAMADOL HYDROCHLORIDE 50 MG/1
TABLET ORAL
COMMUNITY
Start: 2019-06-11 | End: 2020-06-05

## 2019-07-03 RX ORDER — METHYLPREDNISOLONE 4 MG/1
TABLET ORAL
Qty: 1 EACH | Refills: 0 | Status: SHIPPED | OUTPATIENT
Start: 2019-07-03 | End: 2020-06-05

## 2019-07-03 RX ORDER — TOPIRAMATE 100 MG/1
100 CAPSULE, EXTENDED RELEASE ORAL DAILY
Qty: 30 CAPSULE | Refills: 3 | Status: SHIPPED | OUTPATIENT
Start: 2019-07-03 | End: 2019-08-16

## 2019-07-03 RX ORDER — TOPIRAMATE 50 MG/1
50 CAPSULE, EXTENDED RELEASE ORAL DAILY
Qty: 30 CAPSULE | Refills: 3 | Status: SHIPPED | OUTPATIENT
Start: 2019-07-03 | End: 2019-08-16

## 2019-07-03 NOTE — PROGRESS NOTES
Subjective:    CC: Molly Ferguson is in clinic today for follow up for chronic migraines.    HPI:  She is in clinic for regular follow-up.  Since her last visit, she reports that she has been taking Ajovy as Aimovig was denied.  She has done her last Ajovy injection on June 22.  It was a second injection.  She reports that it has not helped.   She reports that headache frequency and intensity has worsened while on Ajovy.  She is getting on an average 8-10 headaches in a month.  With Aimovig, headache had reduced significantly and she was getting only 1-2 mild headaches in a month.  She denies any side effects with Ajovy though.    The following portions of the patient's history were reviewed and updated as of 07/03/2019: allergies, social history and problem list.       Current Outpatient Medications:   •  DULoxetine (CYMBALTA) 60 MG capsule, , Disp: , Rfl: 0  •  Fremanezumab-vfrm 225 MG/1.5ML solution prefilled syringe, Inject 225 mg under the skin into the appropriate area as directed Every 30 (Thirty) Days., Disp: 1 syringe, Rfl: 11  •  gabapentin (NEURONTIN) 600 MG tablet, TAKE TWO TABLETS BY MOUTH TWICE A DAY, Disp: 360 tablet, Rfl: 0  •  SUMAtriptan (IMITREX) 100 MG tablet, TAKE ONE TABLET BY MOUTH AT ONSET OF HEADACHE; MAY REPEAT ONE TABLET IN 2 HOURS IF NEEDED. MAX OF 200MG A DAY, Disp: 9 tablet, Rfl: 0  •  SUMAtriptan (IMITREX) 6 MG/0.5ML solution injection, Inject 0.5 mL under the skin 1 (One) Time As Needed for Migraine for up to 5 doses., Disp: 0.5 mL, Rfl: 2  •  traMADol (ULTRAM) 50 MG tablet, , Disp: , Rfl:   •  Vortioxetine HBr (TRINTELLIX) 20 MG tablet, Take  by mouth., Disp: , Rfl:   •  methylPREDNISolone (MEDROL, BALTAZAR,) 4 MG tablet, Take as directed on package instructions., Disp: 1 each, Rfl: 0  •  Topiramate ER (TROKENDI XR) 100 MG capsule sustained-release 24 hr, Take 1 capsule by mouth Daily., Disp: 30 capsule, Rfl: 3  •  Topiramate ER (TROKENDI XR) 50 MG capsule sustained-release 24 hr, Take 1  "capsule by mouth Daily., Disp: 30 capsule, Rfl: 3   Past Medical History:   Diagnosis Date   • History of anxiety    • History of hyperlipidemia    • Migraines    • Visual disturbances       Past Surgical History:   Procedure Laterality Date   • HYSTERECTOMY     • TUBAL ABDOMINAL LIGATION        Family History   Problem Relation Age of Onset   • Hypertension Father    • Heart disease Father    • Diabetes Father    • Hypertension Maternal Grandmother    • Breast cancer Maternal Grandmother    • Alzheimer's disease Other    • Parkinsonism Other         Parkinsons Disease   • Heart disease Other         Congenital   • Cancer Other         Review of Systems   Gastrointestinal: Positive for constipation.   Neurological: Positive for headache.     Objective:    /76   Ht 152.4 cm (60\")   Wt 67.6 kg (149 lb)   BMI 29.10 kg/m²     Neurology Exam:  General apperance: NAD.     Mental status: Alert, awake and oriented to time place and person.    Recent and Remote memory: Can recall 3/3 objects at 5 minutes. Can recall historical events.     Attention span and Concentration: Serial 7s: Normal.     Fund of knowledge:  Normal.     Language and Speech: No aphasia or dysarthria.    Naming , Repitition and Comprehension:  Can name objects, repeat a sentence and follow commands. Speech is clear and fluent with good repetition, comprehension, and naming.    CN II to XII: Intact.    Opthalmoscopic Exam: No papilledema.    Motor:  Right UE muscle strength 5/5. Normal tone.     Left UE muscle strength 5/5. Normal tone.      Right LE muscle strength5/5. Normal tone.     Left LE muscle strength 5/5. Normal tone.      Sensory: Normal light touch, vibration and pinprick sensation bilaterally.    DTRs: 2+ bilaterally.    Babinski: Negative bilaterally.    Co-ordination: Normal finger-to-nose, heel to shin B/L.    Rhomberg: Negative.    Gait: Normal.    Cardiovascular: Regular rate and rhythm without murmur, gallop or " rub.    Assessment and Plan:  1. Intractable chronic migraine without aura and without status migrainosus  She responded very well to Aimovig but unfortunately insurance does not cover it.  Now she is on Ajovy and has done 2 injections so far.  She reports that headaches have become worse in intensity and frequency with Ajovy.  I will be prescribing her Medrol Dosepak to reduce the intensity and frequency of headaches and also will change Topamax 50 mg immediate release twice daily tablets to Trokendi  mg daily as immediate release has not worked well.  I have advised her to continue Ajovy for now as some patients have responded only after third injection.  I will see her back in clinic in 6 to 8 weeks for follow-up.       I spent 30 minutes face to face with the patient and spent 16 minutes of this time  in management, instructions and education regarding above mentioned diagnosis and also on counseling and discussing about taking medication regularly, possible side effects with medication use, importance of good sleep hygiene, good hydration and regular exercise.    Return in about 6 weeks (around 8/14/2019).

## 2019-07-08 RX ORDER — SUMATRIPTAN 100 MG/1
TABLET, FILM COATED ORAL
Qty: 9 TABLET | Refills: 0 | Status: SHIPPED | OUTPATIENT
Start: 2019-07-08 | End: 2019-11-27 | Stop reason: SDUPTHER

## 2019-08-16 ENCOUNTER — OFFICE VISIT (OUTPATIENT)
Dept: NEUROLOGY | Facility: CLINIC | Age: 44
End: 2019-08-16

## 2019-08-16 VITALS
SYSTOLIC BLOOD PRESSURE: 120 MMHG | DIASTOLIC BLOOD PRESSURE: 74 MMHG | HEIGHT: 60 IN | BODY MASS INDEX: 29.45 KG/M2 | WEIGHT: 150 LBS

## 2019-08-16 DIAGNOSIS — G43.719 INTRACTABLE CHRONIC MIGRAINE WITHOUT AURA AND WITHOUT STATUS MIGRAINOSUS: Primary | ICD-10-CM

## 2019-08-16 PROCEDURE — 99214 OFFICE O/P EST MOD 30 MIN: CPT | Performed by: PSYCHIATRY & NEUROLOGY

## 2019-08-16 RX ORDER — ONDANSETRON 4 MG/1
4 TABLET, FILM COATED ORAL AS NEEDED
Qty: 15 TABLET | Refills: 0 | Status: SHIPPED | OUTPATIENT
Start: 2019-08-16 | End: 2022-08-01

## 2019-08-16 NOTE — PROGRESS NOTES
Subjective:    CC: Molly Ferguson is in clinic today for follow up for chronic intractable migraines.    HPI:  She is in clinic for regular follow-up.  Since her last visit, she reports that she continues to have frequent and intense headaches.  She is reporting approximately 15-20 headache days.  She has done total of 4 Ajovy injections and reports that it has not helped.  The Mobic was working really well and the headache frequency had gone down to 2-3 in a month but unfortunately insurance is not covering it.  She reports that one headache was really intense and lasted for 88 to 10 hours associated with intense nausea and vomiting.  She has been taking Trokendi  mg daily as well.    The following portions of the patient's history were reviewed and updated as of 08/16/2019: allergies, social history and problem list.       Current Outpatient Medications:   •  DULoxetine (CYMBALTA) 60 MG capsule, , Disp: , Rfl: 0  •  gabapentin (NEURONTIN) 600 MG tablet, TAKE TWO TABLETS BY MOUTH TWICE A DAY, Disp: 360 tablet, Rfl: 0  •  methylPREDNISolone (MEDROL, BALTAZAR,) 4 MG tablet, Take as directed on package instructions., Disp: 1 each, Rfl: 0  •  SUMAtriptan (IMITREX) 100 MG tablet, TAKE ONE TABLET BY MOUTH AT ONSET OF HEADACHE; MAY REPEAT ONE TABLET IN 2 HOURS IF NEEDED. MAX OF 200MG A DAY, Disp: 9 tablet, Rfl: 0  •  SUMAtriptan (IMITREX) 6 MG/0.5ML solution injection, Inject 0.5 mL under the skin 1 (One) Time As Needed for Migraine for up to 5 doses., Disp: 0.5 mL, Rfl: 2  •  traMADol (ULTRAM) 50 MG tablet, , Disp: , Rfl:   •  Vortioxetine HBr (TRINTELLIX) 20 MG tablet, Take  by mouth., Disp: , Rfl:   •  galcanezumab-gnlm (EMGALITY) 120 MG/ML prefilled syringe, Inject 1 mL under the skin into the appropriate area as directed Every 30 (Thirty) Days., Disp: 1.12 mL, Rfl: 11  •  ondansetron (ZOFRAN) 4 MG tablet, Take 1 tablet by mouth As Needed for Nausea or Vomiting., Disp: 15 tablet, Rfl: 0  •  Topiramate ER (TROKENDI  "XR) 200 MG capsule sustained-release 24 hr, Take 200 mg by mouth Daily., Disp: 30 capsule, Rfl: 3   Past Medical History:   Diagnosis Date   • History of anxiety    • History of hyperlipidemia    • Migraines    • Visual disturbances       Past Surgical History:   Procedure Laterality Date   • HYSTERECTOMY     • TUBAL ABDOMINAL LIGATION        Family History   Problem Relation Age of Onset   • Hypertension Father    • Heart disease Father    • Diabetes Father    • Hypertension Maternal Grandmother    • Breast cancer Maternal Grandmother    • Alzheimer's disease Other    • Parkinsonism Other         Parkinsons Disease   • Heart disease Other         Congenital   • Cancer Other         Review of Systems   Constitutional: Positive for fatigue.   Neurological: Positive for headache.     Objective:    /74   Ht 152.4 cm (60\")   Wt 68 kg (150 lb)   BMI 29.29 kg/m²     Neurology Exam:  General apperance: NAD.     Mental status: Alert, awake and oriented to time place and person.    Recent and Remote memory: Can recall 3/3 objects at 5 minutes. Can recall historical events.     Attention span and Concentration: Serial 7s: Normal.     Fund of knowledge:  Normal.     Language and Speech: No aphasia or dysarthria.    Naming , Repitition and Comprehension:  Can name objects, repeat a sentence and follow commands. Speech is clear and fluent with good repetition, comprehension, and naming.    CN II to XII: Intact.    Opthalmoscopic Exam: No papilledema.    Motor:  Right UE muscle strength 5/5. Normal tone.     Left UE muscle strength 5/5. Normal tone.      Right LE muscle strength5/5. Normal tone.     Left LE muscle strength 5/5. Normal tone.      Sensory: Normal light touch, vibration and pinprick sensation bilaterally.    DTRs: 2+ bilaterally.    Babinski: Negative bilaterally.    Co-ordination: Normal finger-to-nose, heel to shin B/L.    Rhomberg: Negative.    Gait: Normal.    Cardiovascular: Regular rate and rhythm " without murmur, gallop or rub.    Assessment and Plan:  1. Intractable chronic migraine without aura and without status migrainosus  Ajovy has not helped.  She has done for a total of 4 injections but her headache intensity and frequency remains unchanged.  She did very well on Aimovig but insurance is unfortunately not covering it.  I will be starting her on Emgality 120 mg subcutaneous monthly injection as a preventative therapy.  She was given a sample of loading dose-240 mg today in clinic.  She is due for next injection in 30 days from today.  Will be increasing TROKENDI XR to 200 mg daily and have advised her to start taking Zofran 4 mg along with Imitrex for severe headaches to prevent his nausea and vomiting.  I will see her back in 6 weeks for follow-up.    I spent 25 minutes face to face with the patient and spent more than 50% of this time  in management, instructions and education regarding above mentioned diagnosis and also on counseling and discussing about taking medication regularly, possible side effects with medication use, importance of good sleep hygiene, good hydration and regular exercise.    Return in about 6 weeks (around 9/27/2019).

## 2019-09-23 RX ORDER — GABAPENTIN 600 MG/1
TABLET ORAL
Qty: 360 TABLET | Refills: 0 | Status: SHIPPED | OUTPATIENT
Start: 2019-09-23 | End: 2019-12-05 | Stop reason: SDUPTHER

## 2019-10-07 ENCOUNTER — OFFICE VISIT (OUTPATIENT)
Dept: NEUROLOGY | Facility: CLINIC | Age: 44
End: 2019-10-07

## 2019-10-07 VITALS
BODY MASS INDEX: 29.84 KG/M2 | DIASTOLIC BLOOD PRESSURE: 74 MMHG | SYSTOLIC BLOOD PRESSURE: 112 MMHG | WEIGHT: 152 LBS | HEIGHT: 60 IN

## 2019-10-07 DIAGNOSIS — G43.719 INTRACTABLE CHRONIC MIGRAINE WITHOUT AURA AND WITHOUT STATUS MIGRAINOSUS: Primary | ICD-10-CM

## 2019-10-07 PROCEDURE — 99214 OFFICE O/P EST MOD 30 MIN: CPT | Performed by: PSYCHIATRY & NEUROLOGY

## 2019-10-07 NOTE — PROGRESS NOTES
Subjective:    CC: Molly Ferguson is in clinic today for follow up for chronic migraines.    HPI:  She is in clinic for regular follow-up.  Since her last visit, she has been doing Emgality 120 mg subcutaneous monthly injections.  She has done total of 2 injections so far.  She reports that it has helped significantly better than Ajovy and her headaches have reduced to 3 migraines in a month from 15-20 headache days with Ajovy.  She is tolerating Emgality well without any side effects.  She has increased dose of Trokendi XR to 200 mg as well and is tolerating it well as well.  Overall, she is very happy with current combination.  She is using Imitrex 100 mg along with Zofran as needed as an abortive therapy and it seems to be working really well.    The following portions of the patient's history were reviewed and updated as of 10/07/2019: allergies, social history and problem list.       Current Outpatient Medications:   •  DULoxetine (CYMBALTA) 60 MG capsule, , Disp: , Rfl: 0  •  gabapentin (NEURONTIN) 600 MG tablet, TAKE TWO TABLETS BY MOUTH TWICE A DAY, Disp: 360 tablet, Rfl: 0  •  galcanezumab-gnlm (EMGALITY) 120 MG/ML prefilled syringe, Inject 1 mL under the skin into the appropriate area as directed Every 30 (Thirty) Days., Disp: 1.12 mL, Rfl: 11  •  methylPREDNISolone (MEDROL, BALTAZAR,) 4 MG tablet, Take as directed on package instructions., Disp: 1 each, Rfl: 0  •  ondansetron (ZOFRAN) 4 MG tablet, Take 1 tablet by mouth As Needed for Nausea or Vomiting., Disp: 15 tablet, Rfl: 0  •  SUMAtriptan (IMITREX) 100 MG tablet, TAKE ONE TABLET BY MOUTH AT ONSET OF HEADACHE; MAY REPEAT ONE TABLET IN 2 HOURS IF NEEDED. MAX OF 200MG A DAY, Disp: 9 tablet, Rfl: 0  •  SUMAtriptan (IMITREX) 6 MG/0.5ML solution injection, Inject 0.5 mL under the skin 1 (One) Time As Needed for Migraine for up to 5 doses., Disp: 0.5 mL, Rfl: 2  •  Topiramate ER (TROKENDI XR) 200 MG capsule sustained-release 24 hr, Take 200 mg by mouth Daily.,  "Disp: 30 capsule, Rfl: 3  •  traMADol (ULTRAM) 50 MG tablet, , Disp: , Rfl:   •  Vortioxetine HBr (TRINTELLIX) 20 MG tablet, Take  by mouth., Disp: , Rfl:    Past Medical History:   Diagnosis Date   • History of anxiety    • History of hyperlipidemia    • Migraines    • Visual disturbances       Past Surgical History:   Procedure Laterality Date   • HYSTERECTOMY     • TUBAL ABDOMINAL LIGATION        Family History   Problem Relation Age of Onset   • Hypertension Father    • Heart disease Father    • Diabetes Father    • Hypertension Maternal Grandmother    • Breast cancer Maternal Grandmother    • Alzheimer's disease Other    • Parkinsonism Other         Parkinsons Disease   • Heart disease Other         Congenital   • Cancer Other         Review of Systems   All other systems reviewed and are negative.    Objective:    /74   Ht 152.4 cm (60\")   Wt 68.9 kg (152 lb)   BMI 29.69 kg/m²     Neurology Exam:  General apperance: NAD.     Mental status: Alert, awake and oriented to time place and person.    Recent and Remote memory: Can recall 3/3 objects at 5 minutes. Can recall historical events.     Attention span and Concentration: Serial 7s: Normal.     Fund of knowledge:  Normal.     Language and Speech: No aphasia or dysarthria.    Naming , Repitition and Comprehension:  Can name objects, repeat a sentence and follow commands. Speech is clear and fluent with good repetition, comprehension, and naming.    CN II to XII: Intact.    Opthalmoscopic Exam: No papilledema.    Motor:  Right UE muscle strength 5/5. Normal tone.     Left UE muscle strength 5/5. Normal tone.      Right LE muscle strength5/5. Normal tone.     Left LE muscle strength 5/5. Normal tone.      Sensory: Normal light touch, vibration and pinprick sensation bilaterally.    DTRs: 2+ bilaterally.    Babinski: Negative bilaterally.    Co-ordination: Normal finger-to-nose, heel to shin B/L.    Rhomberg: Negative.    Gait: Normal.    Cardiovascular: " Regular rate and rhythm without murmur, gallop or rub.    Assessment and Plan:  1. Intractable chronic migraine without aura and without status migrainosus  -Doing very well with Emgality 120 mg subcutaneous injection and TROKENDI  mg daily.  Headache frequency and intensity has reduced significantly.  Currently she is reporting 3 breakthrough headaches in a month.  Continue with current combination.  I will see her back in 4 months for follow-up.    I spent 25 minutes face to face with the patient and spent more than 50% of this time  in management, instructions and education regarding above mentioned diagnosis and also on counseling and discussing about taking medication regularly, possible side effects with medication use, importance of good sleep hygiene, good hydration and regular exercise.    No Follow-up on file.

## 2019-11-27 ENCOUNTER — TELEPHONE (OUTPATIENT)
Dept: NEUROLOGY | Facility: CLINIC | Age: 44
End: 2019-11-27

## 2019-11-27 RX ORDER — SUMATRIPTAN 100 MG/1
TABLET, FILM COATED ORAL
Qty: 9 TABLET | Refills: 0 | Status: SHIPPED | OUTPATIENT
Start: 2019-11-27 | End: 2019-12-20 | Stop reason: SDUPTHER

## 2019-11-27 NOTE — TELEPHONE ENCOUNTER
----- Message from Pia Pham sent at 11/27/2019  9:21 AM EST -----  Contact: Molly [phone]  Alexsander,    Shannon called to inform that migraines have increased this month. Molly has asked to be call to discuss getting more of RX SUMAtriptan (IMITREX) 100 MG tablet  And if she could possibly get shots as well. Pt stated that she has had a migraine since Sunday.

## 2019-12-05 ENCOUNTER — TELEPHONE (OUTPATIENT)
Dept: NEUROLOGY | Facility: CLINIC | Age: 44
End: 2019-12-05

## 2019-12-05 RX ORDER — GABAPENTIN 600 MG/1
TABLET ORAL
Qty: 360 TABLET | Refills: 0 | Status: SHIPPED | OUTPATIENT
Start: 2019-12-05 | End: 2020-03-18 | Stop reason: SDUPTHER

## 2019-12-05 NOTE — TELEPHONE ENCOUNTER
Called pt and left vm informing refill Rx has been sent into pharmacy and if have any questions or concerns to please give office a call. Thanks.

## 2019-12-18 RX ORDER — TOPIRAMATE 200 MG/1
CAPSULE, EXTENDED RELEASE ORAL
Qty: 30 CAPSULE | Refills: 2 | Status: SHIPPED | OUTPATIENT
Start: 2019-12-18 | End: 2020-03-04 | Stop reason: SDUPTHER

## 2019-12-20 ENCOUNTER — TELEPHONE (OUTPATIENT)
Dept: NEUROLOGY | Facility: CLINIC | Age: 44
End: 2019-12-20

## 2019-12-20 RX ORDER — SUMATRIPTAN 6 MG/.5ML
6 INJECTION, SOLUTION SUBCUTANEOUS ONCE AS NEEDED
Qty: 0.5 ML | Refills: 6 | Status: SHIPPED | OUTPATIENT
Start: 2019-12-20 | End: 2020-06-17 | Stop reason: SDUPTHER

## 2019-12-20 RX ORDER — SUMATRIPTAN 100 MG/1
TABLET, FILM COATED ORAL
Qty: 9 TABLET | Refills: 3 | Status: SHIPPED | OUTPATIENT
Start: 2019-12-20 | End: 2020-01-22

## 2019-12-20 NOTE — TELEPHONE ENCOUNTER
----- Message from Pia Pham sent at 12/19/2019 11:35 AM EST -----  Contact: Molly [phone]  Dr morales,    Pt called in regards to possibly starting on IMITREX  shots as well as the pills. Please call and advise @[phone]

## 2020-01-09 ENCOUNTER — TELEPHONE (OUTPATIENT)
Dept: NEUROLOGY | Facility: CLINIC | Age: 45
End: 2020-01-09

## 2020-01-09 NOTE — TELEPHONE ENCOUNTER
Can you please call it in combination of dexamethasone 4 mg, Toradol 10 mg, Reglan 10 mg and Benadryl 25 mg to her pharmacy?  She will take all of them once.  Continue with Emgality injections as per schedule.  Thanks

## 2020-01-09 NOTE — TELEPHONE ENCOUNTER
----- Message from Leanne Ewing sent at 1/9/2020 10:37 AM EST -----  Contact: [phone]  Dr. Del Valle,    Pt left a vm in regards to her Rx for EMGALITY. Pt states she took the Rx on Saturday, then had a terrible migraine and vomiting on Monday. Pt states she now on day 4 of a migraines and needs some relief. Pt states she has had three doses of Imitrex already. Please advise.

## 2020-01-22 ENCOUNTER — OFFICE VISIT (OUTPATIENT)
Dept: NEUROLOGY | Facility: CLINIC | Age: 45
End: 2020-01-22

## 2020-01-22 VITALS
DIASTOLIC BLOOD PRESSURE: 76 MMHG | SYSTOLIC BLOOD PRESSURE: 122 MMHG | BODY MASS INDEX: 29.84 KG/M2 | WEIGHT: 152 LBS | HEIGHT: 60 IN

## 2020-01-22 DIAGNOSIS — G43.719 INTRACTABLE CHRONIC MIGRAINE WITHOUT AURA AND WITHOUT STATUS MIGRAINOSUS: Primary | ICD-10-CM

## 2020-01-22 PROCEDURE — 99214 OFFICE O/P EST MOD 30 MIN: CPT | Performed by: PSYCHIATRY & NEUROLOGY

## 2020-01-22 RX ORDER — ELETRIPTAN HYDROBROMIDE 40 MG/1
40 TABLET, FILM COATED ORAL AS NEEDED
Qty: 9 TABLET | Refills: 2 | Status: SHIPPED | OUTPATIENT
Start: 2020-01-22 | End: 2020-09-11 | Stop reason: SDUPTHER

## 2020-01-22 RX ORDER — TRAZODONE HYDROCHLORIDE 50 MG/1
100 TABLET ORAL NIGHTLY
COMMUNITY
Start: 2020-01-21 | End: 2022-07-18

## 2020-01-22 RX ORDER — DIVALPROEX SODIUM 500 MG/1
500 TABLET, DELAYED RELEASE ORAL 2 TIMES DAILY
Qty: 60 TABLET | Refills: 2 | Status: SHIPPED | OUTPATIENT
Start: 2020-01-22 | End: 2020-03-04 | Stop reason: SDUPTHER

## 2020-01-22 RX ORDER — BUPROPION HYDROCHLORIDE 150 MG/1
300 TABLET ORAL EVERY MORNING
COMMUNITY
Start: 2019-12-09 | End: 2020-09-11

## 2020-01-22 NOTE — PROGRESS NOTES
Subjective:    CC: Molly Ferguson is in clinic today for follow up for migraines.    HPI:  Fabi regular follow-up.  Since her last visit, she reports that progressively the migraine intensity and frequency has become worse.  In last 2 months, on an average she is having 10-12 breakthrough migraines ranging from moderate to severe intensity.  She has been doing Emgality will treat helical subcutaneous injections every month and she has completed 6 months worth of therapy.  Takes TROKENDI  mg daily.  She has been using Imitrex subcutaneous injection as an abortive therapy and that seems to be working okay.  She has failed Maxalt and Imitrex tablets in the past.  She has never tried Relpax.        The following portions of the patient's history were reviewed and updated as of 01/22/2020: allergies, social history and problem list.       Current Outpatient Medications:   •  buPROPion XL (WELLBUTRIN XL) 150 MG 24 hr tablet, , Disp: , Rfl:   •  DULoxetine (CYMBALTA) 60 MG capsule, , Disp: , Rfl: 0  •  gabapentin (NEURONTIN) 600 MG tablet, TAKE TWO TABLETS BY MOUTH TWICE A DAY, Disp: 360 tablet, Rfl: 0  •  galcanezumab-gnlm (EMGALITY) 120 MG/ML prefilled syringe, Inject 1 mL under the skin into the appropriate area as directed Every 30 (Thirty) Days., Disp: 1.12 mL, Rfl: 11  •  methylPREDNISolone (MEDROL, BALTAZAR,) 4 MG tablet, Take as directed on package instructions., Disp: 1 each, Rfl: 0  •  ondansetron (ZOFRAN) 4 MG tablet, Take 1 tablet by mouth As Needed for Nausea or Vomiting., Disp: 15 tablet, Rfl: 0  •  SUMAtriptan (IMITREX) 100 MG tablet, Take 1 tab by PO at the onset of headache, may repeat dose in 2 hours as needed. max of 200 mg in 24 hours, Disp: 9 tablet, Rfl: 3  •  SUMAtriptan (IMITREX) 6 MG/0.5ML injection, Inject prescribed dose at onset of headache. May repeat dose one time in 1 hour(s) if headache not relieved., Disp: 0.5 mL, Rfl: 6  •  traMADol (ULTRAM) 50 MG tablet, , Disp: , Rfl:   •  traZODone  "(DESYREL) 50 MG tablet, , Disp: , Rfl:   •  TROKENDI  MG capsule sustained-release 24 hr, TAKE ONE CAPSULE BY MOUTH DAILY, Disp: 30 capsule, Rfl: 2  •  Vortioxetine HBr (TRINTELLIX) 20 MG tablet, Take  by mouth., Disp: , Rfl:   •  divalproex (DEPAKOTE) 500 MG DR tablet, Take 1 tablet by mouth 2 (Two) Times a Day., Disp: 60 tablet, Rfl: 2   Past Medical History:   Diagnosis Date   • History of anxiety    • History of hyperlipidemia    • Migraines    • Visual disturbances       Past Surgical History:   Procedure Laterality Date   • HYSTERECTOMY     • TUBAL ABDOMINAL LIGATION        Family History   Problem Relation Age of Onset   • Hypertension Father    • Heart disease Father    • Diabetes Father    • Hypertension Maternal Grandmother    • Breast cancer Maternal Grandmother    • Alzheimer's disease Other    • Parkinsonism Other         Parkinsons Disease   • Heart disease Other         Congenital   • Cancer Other         Review of Systems   Neurological: Positive for headache.     Objective:    /76   Ht 152.4 cm (60\")   Wt 68.9 kg (152 lb)   BMI 29.69 kg/m²     Neurology Exam:  General apperance: NAD.     Mental status: Alert, awake and oriented to time place and person.    Recent and Remote memory: Can recall 3/3 objects at 5 minutes. Can recall historical events.     Attention span and Concentration: Serial 7s: Normal.     Fund of knowledge:  Normal.     Language and Speech: No aphasia or dysarthria.    Naming , Repitition and Comprehension:  Can name objects, repeat a sentence and follow commands. Speech is clear and fluent with good repetition, comprehension, and naming.    CN II to XII: Intact.    Opthalmoscopic Exam: No papilledema.    Motor:  Right UE muscle strength 5/5. Normal tone.     Left UE muscle strength 5/5. Normal tone.      Right LE muscle strength5/5. Normal tone.     Left LE muscle strength 5/5. Normal tone.      Sensory: Normal light touch, vibration and pinprick sensation " bilaterally.    DTRs: 2+ bilaterally.    Babinski: Negative bilaterally.    Co-ordination: Normal finger-to-nose, heel to shin B/L.    Rhomberg: Negative.    Gait: Normal.    Cardiovascular: Regular rate and rhythm without murmur, gallop or rub.    Assessment and Plan:  1. Intractable chronic migraine without aura and without status migrainosus  -She is reporting worsening in intensity and frequency of migraines.  She has been on Emgality for 6 months.  In the past, she has failed several oral preventative medications and also has failed Ajovy.  She was doing well with Emgality until about 2 months ago when the headache started to become worse.  I have advised her to continue Emgality for now and will be adding Depakote 500 mg twice daily as a preventative therapy.  We will be starting her on new migraine abortive medication Ubrelvy 50 mg to be taken as needed at the onset of migraine as an abortive treatment.  She can always this with Relpax 40 mg.       I spent 25 minutes face to face with the patient and spent more than 50% of this time  in management, instructions and education regarding above mentioned diagnosis and also on counseling and discussing about taking medication regularly, possible side effects with medication use, importance of good sleep hygiene, good hydration and regular exercise.    Return in about 8 weeks (around 3/18/2020).

## 2020-03-04 ENCOUNTER — OFFICE VISIT (OUTPATIENT)
Dept: NEUROLOGY | Facility: CLINIC | Age: 45
End: 2020-03-04

## 2020-03-04 VITALS — BODY MASS INDEX: 29.69 KG/M2 | DIASTOLIC BLOOD PRESSURE: 70 MMHG | SYSTOLIC BLOOD PRESSURE: 122 MMHG | HEIGHT: 60 IN

## 2020-03-04 DIAGNOSIS — G43.719 INTRACTABLE CHRONIC MIGRAINE WITHOUT AURA AND WITHOUT STATUS MIGRAINOSUS: Primary | ICD-10-CM

## 2020-03-04 PROCEDURE — 99214 OFFICE O/P EST MOD 30 MIN: CPT | Performed by: PSYCHIATRY & NEUROLOGY

## 2020-03-04 RX ORDER — DIVALPROEX SODIUM 500 MG/1
500 TABLET, DELAYED RELEASE ORAL 2 TIMES DAILY
Qty: 60 TABLET | Refills: 3 | Status: SHIPPED | OUTPATIENT
Start: 2020-03-04 | End: 2020-08-31

## 2020-03-04 NOTE — PROGRESS NOTES
Subjective:    CC: Molly Ferguson is in clinic today for follow up for tractable migraines.    HPI:  1/22/2020: She is in clinic for regular follow-up.  Since her last visit, she reports that progressively the migraine intensity and frequency has become worse.  In last 2 months, on an average she is having 10-12 breakthrough migraines ranging from moderate to severe intensity.  She has been doing Emgality monthly subcutaneous injectionsand she has completed 6 months worth of therapy.  Takes TROKENDI  mg daily.  She has been using Imitrex subcutaneous injection as an abortive therapy and that seems to be working okay.  She has failed Maxalt and Imitrex tablets in the past.  She has never tried Relpax.  3/4/2020: She is in clinic for regular follow-up.  Since her last visit, she underwent left shoulder surgery and she will be starting physical therapy for left shoulder starting tomorrow.  She reports that overall, the migraine intensity and frequency has improved since her last visit.  She has been taking Depakote 500 mg twice daily along with Emgality monthly injections.  She also tried Ubrelvy as an abortive treatment it worked really well for her.  She tolerated it very well without any side effects.    The following portions of the patient's history were reviewed and updated as of 03/04/2020: allergies, social history and problem list.       Current Outpatient Medications:   •  buPROPion XL (WELLBUTRIN XL) 150 MG 24 hr tablet, , Disp: , Rfl:   •  divalproex (DEPAKOTE) 500 MG DR tablet, Take 1 tablet by mouth 2 (Two) Times a Day., Disp: 60 tablet, Rfl: 3  •  DULoxetine (CYMBALTA) 60 MG capsule, , Disp: , Rfl: 0  •  eletriptan (RELPAX) 40 MG tablet, Take 1 tablet by mouth As Needed for Migraine., Disp: 9 tablet, Rfl: 2  •  gabapentin (NEURONTIN) 600 MG tablet, TAKE TWO TABLETS BY MOUTH TWICE A DAY, Disp: 360 tablet, Rfl: 0  •  galcanezumab-gnlm (EMGALITY) 120 MG/ML prefilled syringe, Inject 1 mL under the skin  "into the appropriate area as directed Every 30 (Thirty) Days., Disp: 1.12 mL, Rfl: 11  •  methylPREDNISolone (MEDROL, BALTAZAR,) 4 MG tablet, Take as directed on package instructions., Disp: 1 each, Rfl: 0  •  ondansetron (ZOFRAN) 4 MG tablet, Take 1 tablet by mouth As Needed for Nausea or Vomiting., Disp: 15 tablet, Rfl: 0  •  SUMAtriptan (IMITREX) 6 MG/0.5ML injection, Inject prescribed dose at onset of headache. May repeat dose one time in 1 hour(s) if headache not relieved., Disp: 0.5 mL, Rfl: 6  •  Topiramate ER (TROKENDI XR) 200 MG capsule sustained-release 24 hr, Take 1 capsule by mouth Daily., Disp: 30 capsule, Rfl: 3  •  traMADol (ULTRAM) 50 MG tablet, , Disp: , Rfl:   •  traZODone (DESYREL) 50 MG tablet, , Disp: , Rfl:   •  Ubrogepant (ubrogepant) 50 MG tablet, Take 50 mg by mouth As Needed (Migraine) for up to 30 days., Disp: 10 tablet, Rfl: 3  •  Vortioxetine HBr (TRINTELLIX) 20 MG tablet, Take  by mouth., Disp: , Rfl:    Past Medical History:   Diagnosis Date   • History of anxiety    • History of hyperlipidemia    • Migraines    • Visual disturbances       Past Surgical History:   Procedure Laterality Date   • HYSTERECTOMY     • TUBAL ABDOMINAL LIGATION        Family History   Problem Relation Age of Onset   • Hypertension Father    • Heart disease Father    • Diabetes Father    • Hypertension Maternal Grandmother    • Breast cancer Maternal Grandmother    • Alzheimer's disease Other    • Parkinsonism Other         Parkinsons Disease   • Heart disease Other         Congenital   • Cancer Other         Review of Systems  Objective:    /70   Ht 152.4 cm (60\")   BMI 29.69 kg/m²     Neurology Exam:  General apperance: NAD.     Mental status: Alert, awake and oriented to time place and person.    Recent and Remote memory: Can recall 3/3 objects at 5 minutes. Can recall historical events.     Attention span and Concentration: Serial 7s: Normal.     Fund of knowledge:  Normal.     Language and Speech: No " aphasia or dysarthria.    Naming , Repitition and Comprehension:  Can name objects, repeat a sentence and follow commands. Speech is clear and fluent with good repetition, comprehension, and naming.    CN II to XII: Intact.    Opthalmoscopic Exam: No papilledema.    Motor:  Right UE muscle strength 5/5. Normal tone.     Left UE muscle strength 5/5. Normal tone.      Right LE muscle strength5/5. Normal tone.     Left LE muscle strength 5/5. Normal tone.      Sensory: Normal light touch, vibration and pinprick sensation bilaterally.    DTRs: 2+ bilaterally.    Babinski: Negative bilaterally.    Co-ordination: Normal finger-to-nose, heel to shin B/L.    Rhomberg: Negative.    Gait: Normal.    Cardiovascular: Regular rate and rhythm without murmur, gallop or rub.    Assessment and Plan:  1. Intractable chronic migraine without aura and without status migrainosus  Since her last visit, she has had improvement in migraine intensity and frequency.  She has had on an average 5-6 breakthrough migraines in a month reduced from 10-12 migraines in a month.  On the last visit, Depakote 500 mg twice daily was started and she denies any side effects.  Continue with Depakote 500 MG twice daily, TROKENDI  daily and Emgality for migraine prevention.  Ubrelvy 50 mg has worked really well as an abortive treatment as well.  I will see her back in 3 months for follow-up.       I spent 25 minutes face to face with the patient and spent more than 50% of this time  in management, instructions and education regarding above mentioned diagnosis and also on counseling and discussing about taking medication regularly, possible side effects with medication use, importance of good sleep hygiene, good hydration and regular exercise.    Return in about 6 months (around 9/4/2020).

## 2020-03-16 ENCOUNTER — PRIOR AUTHORIZATION (OUTPATIENT)
Dept: NEUROLOGY | Facility: CLINIC | Age: 45
End: 2020-03-16

## 2020-03-18 RX ORDER — GABAPENTIN 600 MG/1
TABLET ORAL
Qty: 360 TABLET | Refills: 0 | Status: SHIPPED | OUTPATIENT
Start: 2020-03-18 | End: 2020-05-26

## 2020-03-19 ENCOUNTER — TELEPHONE (OUTPATIENT)
Dept: NEUROLOGY | Facility: CLINIC | Age: 45
End: 2020-03-19

## 2020-03-24 ENCOUNTER — TELEPHONE (OUTPATIENT)
Dept: NEUROLOGY | Facility: CLINIC | Age: 45
End: 2020-03-24

## 2020-03-24 NOTE — TELEPHONE ENCOUNTER
Provider: DR. BARKLEY  Caller: MAYRA FROM [employer]  Phone Number: [phone]    Patient: JESUS CROWE      Reason for Call: THEY NEED A PA FOR THE MEDICATION UBRELVY 50MG . MAYRA STATES THAT THE CLINIC QUESTIONS HAS  AND SHE CAN GO AHEAD AND RENEW THE REQUEST FOR THE MEDICATION. WHEN YOU CALL BACK GIVE THIS REFERENCE KEY [id number].

## 2020-04-27 ENCOUNTER — PRIOR AUTHORIZATION (OUTPATIENT)
Dept: NEUROLOGY | Facility: CLINIC | Age: 45
End: 2020-04-27

## 2020-04-27 NOTE — TELEPHONE ENCOUNTER
PA for patient's Ubrelvy 50MG Tablets was submitted on 04/27/2020 via [employer].     Key: [id number]  DX: G43.719    PA clinical questions were answered and sent to plan. It was approved from 04/27/2020-04/27/2021. PA was faxed to [facility] pharmacy with approval letter successfully.

## 2020-05-25 DIAGNOSIS — G43.719 INTRACTABLE CHRONIC MIGRAINE WITHOUT AURA AND WITHOUT STATUS MIGRAINOSUS: Primary | ICD-10-CM

## 2020-05-26 ENCOUNTER — TELEPHONE (OUTPATIENT)
Dept: NEUROLOGY | Facility: CLINIC | Age: 45
End: 2020-05-26

## 2020-05-26 DIAGNOSIS — G43.719 INTRACTABLE CHRONIC MIGRAINE WITHOUT AURA AND WITHOUT STATUS MIGRAINOSUS: ICD-10-CM

## 2020-05-26 RX ORDER — GABAPENTIN 600 MG/1
TABLET ORAL
Qty: 360 TABLET | Refills: 0 | Status: SHIPPED | OUTPATIENT
Start: 2020-05-26 | End: 2020-05-26 | Stop reason: SDUPTHER

## 2020-05-26 RX ORDER — GABAPENTIN 600 MG/1
1200 TABLET ORAL 2 TIMES DAILY
Qty: 360 TABLET | Refills: 3 | Status: SHIPPED | OUTPATIENT
Start: 2020-05-26 | End: 2020-12-04 | Stop reason: SDUPTHER

## 2020-06-05 ENCOUNTER — OFFICE VISIT (OUTPATIENT)
Dept: NEUROLOGY | Facility: CLINIC | Age: 45
End: 2020-06-05

## 2020-06-05 VITALS
SYSTOLIC BLOOD PRESSURE: 124 MMHG | OXYGEN SATURATION: 99 % | RESPIRATION RATE: 20 BRPM | HEART RATE: 89 BPM | HEIGHT: 60 IN | BODY MASS INDEX: 27.25 KG/M2 | WEIGHT: 138.8 LBS | DIASTOLIC BLOOD PRESSURE: 80 MMHG | TEMPERATURE: 98.6 F

## 2020-06-05 DIAGNOSIS — G43.719 INTRACTABLE CHRONIC MIGRAINE WITHOUT AURA AND WITHOUT STATUS MIGRAINOSUS: Primary | ICD-10-CM

## 2020-06-05 PROCEDURE — 99214 OFFICE O/P EST MOD 30 MIN: CPT | Performed by: PSYCHIATRY & NEUROLOGY

## 2020-06-05 NOTE — PROGRESS NOTES
Subjective:    CC: Molly Ferguson is in clinic today for follow up for chronic migraines.    HPI:  1/22/2020: She is in clinic for regular follow-up.  Since her last visit, she reports that progressively the migraine intensity and frequency has become worse.  In last 2 months, on an average she is having 10-12 breakthrough migraines ranging from moderate to severe intensity.  She has been doing Emgality monthly subcutaneous injectionsand she has completed 6 months worth of therapy.  Takes TROKENDI  mg daily.  She has been using Imitrex subcutaneous injection as an abortive therapy and that seems to be working okay.  She has failed Maxalt and Imitrex tablets in the past.  She has never tried Relpax.    3/4/2020: She is in clinic for regular follow-up.  Since her last visit, she underwent left shoulder surgery and she will be starting physical therapy for left shoulder starting tomorrow.  She reports that overall, the migraine intensity and frequency has improved since her last visit.  She has been taking Depakote 500 mg twice daily along with Emgality monthly injections.  She also tried Ubrelvy as an abortive treatment it worked really well for her.  She tolerated it very well without any side effects.    6/5/2020: She is in clinic for regular follow-up.  Since her last visit, she reports that she has been taking Depakote 500 mg twice a day, TROKENDI  mg daily along with Emgality 120 mg monthly injection.  She reports that she continues to have frequent migraines.  She reports that usually the migraines would start 2 days prior to due date of her Emgality injection and then would stay on for next 7 to 10 days.  She has also developed mild/subtle tremors in both her hands.  Ubrelvy 100 mg if taken at right time works really well as an abortive treatment.      The following portions of the patient's history were reviewed and updated as of 06/05/2020: allergies, social history and problem list.       Current  "Outpatient Medications:   •  buPROPion XL (WELLBUTRIN XL) 150 MG 24 hr tablet, , Disp: , Rfl:   •  divalproex (DEPAKOTE) 500 MG DR tablet, Take 1 tablet by mouth 2 (Two) Times a Day., Disp: 60 tablet, Rfl: 3  •  eletriptan (RELPAX) 40 MG tablet, Take 1 tablet by mouth As Needed for Migraine., Disp: 9 tablet, Rfl: 2  •  gabapentin (NEURONTIN) 600 MG tablet, Take 2 tablets by mouth 2 (Two) Times a Day., Disp: 360 tablet, Rfl: 3  •  galcanezumab-gnlm (EMGALITY) 120 MG/ML prefilled syringe, Inject 1 mL under the skin into the appropriate area as directed Every 30 (Thirty) Days., Disp: 1.12 mL, Rfl: 11  •  SUMAtriptan (IMITREX) 6 MG/0.5ML injection, Inject prescribed dose at onset of headache. May repeat dose one time in 1 hour(s) if headache not relieved., Disp: 0.5 mL, Rfl: 6  •  Topiramate ER (TROKENDI XR) 200 MG capsule sustained-release 24 hr, Take 1 capsule by mouth Daily., Disp: 30 capsule, Rfl: 3  •  traZODone (DESYREL) 50 MG tablet, , Disp: , Rfl:   •  Vortioxetine HBr (TRINTELLIX) 20 MG tablet, Take  by mouth., Disp: , Rfl:   •  ondansetron (ZOFRAN) 4 MG tablet, Take 1 tablet by mouth As Needed for Nausea or Vomiting., Disp: 15 tablet, Rfl: 0   Past Medical History:   Diagnosis Date   • History of anxiety    • History of hyperlipidemia    • Migraines    • Visual disturbances       Past Surgical History:   Procedure Laterality Date   • HYSTERECTOMY     • SHOULDER SURGERY Left 02/11/2020   • TUBAL ABDOMINAL LIGATION        Family History   Problem Relation Age of Onset   • Hypertension Father    • Heart disease Father    • Diabetes Father    • Hypertension Maternal Grandmother    • Breast cancer Maternal Grandmother    • Alzheimer's disease Other    • Parkinsonism Other         Parkinsons Disease   • Heart disease Other         Congenital   • Cancer Other         Review of Systems  Objective:    /80   Pulse 89   Temp 98.6 °F (37 °C) (Temporal)   Resp 20   Ht 152.4 cm (60\")   Wt 63 kg (138 lb 12.8 oz)  "  SpO2 99%   BMI 27.11 kg/m²     Neurology Exam:  General apperance: NAD.     Mental status: Alert, awake and oriented to time place and person.    Recent and Remote memory: Can recall 3/3 objects at 5 minutes. Can recall historical events.     Attention span and Concentration: Serial 7s: Normal.     Fund of knowledge:  Normal.     Language and Speech: No aphasia or dysarthria.    Naming , Repitition and Comprehension:  Can name objects, repeat a sentence and follow commands. Speech is clear and fluent with good repetition, comprehension, and naming.    CN II to XII: Intact.    Opthalmoscopic Exam: No papilledema.    Motor:  Right UE muscle strength 5/5. Normal tone.     Left UE muscle strength 5/5. Normal tone.      Right LE muscle strength5/5. Normal tone.     Left LE muscle strength 5/5. Normal tone.      Sensory: Normal light touch, vibration and pinprick sensation bilaterally.    DTRs: 2+ bilaterally.    Babinski: Negative bilaterally.    Co-ordination: Normal finger-to-nose, heel to shin B/L.    Rhomberg: Negative.    Gait: Normal.    Cardiovascular: Regular rate and rhythm without murmur, gallop or rub.    Assessment and Plan:  1. Intractable chronic migraine without aura and without status migrainosus  She currently is experiencing 15-20 migraine days.  She is on TROKENDI XR, Depakote 500 mg twice a day.  I am going to add Botox to her current regiment as a preventative treatment have advised her to take Emgality every 28 days and see if it helps.  Continue with Ubrelvy alternating with Relpax as an abortive treatment.  I did notice subtle tremors in both her hands it is likely caused by Depakote use.  If she start responding to Botox and plan is to taper her off of Depakote eventually.  I will see her next for Botox injection.       I spent 25 minutes face to face with the patient and spent more than 50% of this time  in management, instructions and education regarding above mentioned diagnosis and also on  counseling and discussing about taking medication regularly, possible side effects with medication use, importance of good sleep hygiene, good hydration and regular exercise.    Return in about 6 weeks (around 7/17/2020) for Botox.

## 2020-06-10 RX ORDER — SUMATRIPTAN 100 MG/1
TABLET, FILM COATED ORAL
Qty: 9 TABLET | Refills: 0 | OUTPATIENT
Start: 2020-06-10

## 2020-06-17 ENCOUNTER — TELEPHONE (OUTPATIENT)
Dept: NEUROLOGY | Facility: CLINIC | Age: 45
End: 2020-06-17

## 2020-06-17 RX ORDER — SUMATRIPTAN 100 MG/1
TABLET, FILM COATED ORAL
COMMUNITY
Start: 2020-04-24 | End: 2020-06-17 | Stop reason: SDUPTHER

## 2020-06-17 RX ORDER — SUMATRIPTAN 6 MG/.5ML
6 INJECTION, SOLUTION SUBCUTANEOUS ONCE AS NEEDED
Qty: 0.5 ML | Refills: 5 | Status: SHIPPED | OUTPATIENT
Start: 2020-06-17 | End: 2020-12-04 | Stop reason: SDUPTHER

## 2020-06-17 RX ORDER — SUMATRIPTAN 100 MG/1
TABLET, FILM COATED ORAL
Qty: 10 TABLET | Refills: 3 | Status: SHIPPED | OUTPATIENT
Start: 2020-06-17 | End: 2020-06-18 | Stop reason: SDUPTHER

## 2020-06-17 NOTE — TELEPHONE ENCOUNTER
Patient is requesting a refill for Sumatriptan 100 MG, but I do not show that we prescribed a tablet. Please advise.

## 2020-06-18 ENCOUNTER — TELEPHONE (OUTPATIENT)
Dept: NEUROLOGY | Facility: CLINIC | Age: 45
End: 2020-06-18

## 2020-06-18 DIAGNOSIS — G43.719 INTRACTABLE CHRONIC MIGRAINE WITHOUT AURA AND WITHOUT STATUS MIGRAINOSUS: Primary | ICD-10-CM

## 2020-06-18 RX ORDER — SUMATRIPTAN 100 MG/1
TABLET, FILM COATED ORAL
Qty: 10 TABLET | Refills: 3 | Status: SHIPPED | OUTPATIENT
Start: 2020-06-18 | End: 2020-09-11 | Stop reason: SDUPTHER

## 2020-06-18 NOTE — TELEPHONE ENCOUNTER
Reordered with clarification. Added the following instructions:  Take 1 tablet at the onset of a migraine, may repeat once after 2 hours. Not exceeding 2 tablets in a 24 hour period

## 2020-06-18 NOTE — TELEPHONE ENCOUNTER
Provider: DR. BARKLEY  Caller: VALENTINE WITH [facility]    Patient: JESUS CROWE  BOD: 1975    Reason for Call: VALENTINE WITH THE [facility] CALLED NEEDING CLARIFCATION ON THE MEDICATION   SUMAtriptan (IMITREX) 100 MG tablet, IT HAS NO DIRECTIONS, PLEASE FAX A CLARIFCATION ORDER OR CALL HER [phone]

## 2020-07-13 RX ORDER — TOPIRAMATE 200 MG/1
CAPSULE, EXTENDED RELEASE ORAL
Qty: 90 CAPSULE | Refills: 2 | Status: SHIPPED | OUTPATIENT
Start: 2020-07-13 | End: 2020-12-04 | Stop reason: SDUPTHER

## 2020-07-24 ENCOUNTER — PROCEDURE VISIT (OUTPATIENT)
Dept: NEUROLOGY | Facility: CLINIC | Age: 45
End: 2020-07-24

## 2020-07-24 VITALS
WEIGHT: 141 LBS | SYSTOLIC BLOOD PRESSURE: 118 MMHG | TEMPERATURE: 98.2 F | BODY MASS INDEX: 27.68 KG/M2 | HEART RATE: 75 BPM | HEIGHT: 60 IN | DIASTOLIC BLOOD PRESSURE: 70 MMHG | OXYGEN SATURATION: 98 %

## 2020-07-24 DIAGNOSIS — G43.719 INTRACTABLE CHRONIC MIGRAINE WITHOUT AURA AND WITHOUT STATUS MIGRAINOSUS: Primary | ICD-10-CM

## 2020-07-24 PROCEDURE — 64615 CHEMODENERV MUSC MIGRAINE: CPT | Performed by: PSYCHIATRY & NEUROLOGY

## 2020-07-24 RX ORDER — CEFUROXIME AXETIL 250 MG/1
TABLET ORAL
COMMUNITY
Start: 2020-05-25 | End: 2020-09-11

## 2020-07-24 RX ORDER — UBROGEPANT 50 MG/1
50 TABLET ORAL AS NEEDED
COMMUNITY
Start: 2020-05-25 | End: 2020-12-28

## 2020-07-24 NOTE — PROGRESS NOTES
CC:  Migraines.    HPI : Patient is in clinic for 1ST Botox injection.  Current headache frequency is approximately 15-20  headache days in a month with some headaches lasting for more than 4 hours.      Botox Procedure Note    Current headache frequency: 15-20__ headaches days / month.    The risks and benefits were discussed with the patient. The patient was given the opportunity to ask questions. Informed consent form was signed.    Onabotulinumtoxin A was reconstituted with 0.9% normal saline. All injections sites were prepped with alcohol swab. Approximately 5 units of botox were injected into each of the following sites bilaterally as per routine migraine botox injection PREEMPT protocol: , procerus, frontalis, temporalis, occipitalis, upper cervical paraspinals, and trapezius.    The patient tolerated the procedure well. There were no immediate complications. The patient will follow up in approximately 12 weeks for her next injection.    Total amount of onabotulinumtoxin A injected was 155 units with 45 units wasted.    Additional notes: I will see her back in 6 to 8 weeks for regular follow-up and 12 weeks for next Botox injection.

## 2020-08-31 RX ORDER — DIVALPROEX SODIUM 500 MG/1
TABLET, DELAYED RELEASE ORAL
Qty: 60 TABLET | Refills: 2 | Status: SHIPPED | OUTPATIENT
Start: 2020-08-31 | End: 2020-12-04 | Stop reason: SDUPTHER

## 2020-08-31 RX ORDER — GALCANEZUMAB 120 MG/ML
INJECTION, SOLUTION SUBCUTANEOUS
Qty: 1 EACH | Refills: 10 | Status: SHIPPED | OUTPATIENT
Start: 2020-08-31 | End: 2020-12-04 | Stop reason: SDUPTHER

## 2020-09-10 ENCOUNTER — TELEPHONE (OUTPATIENT)
Dept: NEUROLOGY | Facility: CLINIC | Age: 45
End: 2020-09-10

## 2020-09-10 NOTE — TELEPHONE ENCOUNTER
----- Message from Radha Staples MA sent at 9/10/2020 10:44 AM EDT -----  Regarding: RE: Botox  I have the authorization until 12/19/2020. Tell her to bring the denial to her follow up appt and let me see it.   ----- Message -----  From: Marli Cohn CMA  Sent: 9/10/2020  10:28 AM EDT  To: Radha Staples MA  Subject: Botox                                            While confirming this patients follow up appointment she said that she had received a letter stating that her Botox had been denied.  Do you have any thoughts on it?

## 2020-09-11 ENCOUNTER — OFFICE VISIT (OUTPATIENT)
Dept: NEUROLOGY | Facility: CLINIC | Age: 45
End: 2020-09-11

## 2020-09-11 VITALS
HEIGHT: 60 IN | DIASTOLIC BLOOD PRESSURE: 76 MMHG | BODY MASS INDEX: 27.29 KG/M2 | WEIGHT: 139 LBS | SYSTOLIC BLOOD PRESSURE: 112 MMHG | OXYGEN SATURATION: 99 % | TEMPERATURE: 97.8 F | HEART RATE: 69 BPM

## 2020-09-11 DIAGNOSIS — G43.719 INTRACTABLE CHRONIC MIGRAINE WITHOUT AURA AND WITHOUT STATUS MIGRAINOSUS: Primary | ICD-10-CM

## 2020-09-11 PROCEDURE — 99214 OFFICE O/P EST MOD 30 MIN: CPT | Performed by: PSYCHIATRY & NEUROLOGY

## 2020-09-11 RX ORDER — DICYCLOMINE HYDROCHLORIDE 10 MG/1
20 CAPSULE ORAL
COMMUNITY
Start: 2020-08-06 | End: 2021-01-15

## 2020-09-11 RX ORDER — ELETRIPTAN HYDROBROMIDE 40 MG/1
40 TABLET, FILM COATED ORAL AS NEEDED
Qty: 10 TABLET | Refills: 3 | Status: SHIPPED | OUTPATIENT
Start: 2020-09-11 | End: 2021-07-16

## 2020-09-11 RX ORDER — BUPROPION HYDROCHLORIDE 300 MG/1
300 TABLET ORAL EVERY MORNING
COMMUNITY
Start: 2020-07-03 | End: 2022-08-30

## 2020-09-11 RX ORDER — SUMATRIPTAN 100 MG/1
TABLET, FILM COATED ORAL
Qty: 10 TABLET | Refills: 3 | Status: SHIPPED | OUTPATIENT
Start: 2020-09-11 | End: 2020-12-04 | Stop reason: SDUPTHER

## 2020-09-11 RX ORDER — CARIPRAZINE 1.5 MG/1
1.5 CAPSULE, GELATIN COATED ORAL DAILY
COMMUNITY
Start: 2020-08-19 | End: 2021-01-15

## 2020-09-11 NOTE — PROGRESS NOTES
Subjective:    CC: Molly Ferguson is in clinic today for follow up for intractable chronic migraines.    HPI:  1/22/2020: She is in clinic for regular follow-up.  Since her last visit, she reports that progressively the migraine intensity and frequency has become worse.  In last 2 months, on an average she is having 10-12 breakthrough migraines ranging from moderate to severe intensity.  She has been doing Emgality monthly subcutaneous injectionsand she has completed 6 months worth of therapy.  Takes TROKENDI  mg daily.  She has been using Imitrex subcutaneous injection as an abortive therapy and that seems to be working okay.  She has failed Maxalt and Imitrex tablets in the past.  She has never tried Relpax.    3/4/2020: She is in clinic for regular follow-up.  Since her last visit, she underwent left shoulder surgery and she will be starting physical therapy for left shoulder starting tomorrow.  She reports that overall, the migraine intensity and frequency has improved since her last visit.  She has been taking Depakote 500 mg twice daily along with Emgality monthly injections.  She also tried Ubrelvy as an abortive treatment it worked really well for her.  She tolerated it very well without any side effects.    6/5/2020: She is in clinic for regular follow-up.  Since her last visit, she reports that she has been taking Depakote 500 mg twice a day, TROKENDI  mg daily along with Emgality 120 mg monthly injection.  She reports that she continues to have frequent migraines.  She reports that usually the migraines would start 2 days prior to due date of her Emgality injection and then would stay on for next 7 to 10 days.  She has also developed mild/subtle tremors in both her hands.  Ubrelvy 100 mg if taken at right time works really well as an abortive treatment.    9/11/2020: She is in clinic for follow-up after the first Botox injection.  It was done 6 weeks ago.  She reports that 2 and half to 3  weeks after the injection, the migraine had resolved almost completely but then slowly it has increased in intensity and frequency.  She continues to take Emgality monthly injection, Depakote 500 mg daily and TROKENDI  mg daily.  She takes Imitrex alternating with Relpax for Ubrelvy as an abortive treatment and seems to be working well.      The following portions of the patient's history were reviewed and updated as of 09/11/2020: allergies, social history and problem list.       Current Outpatient Medications:   •  buPROPion XL (WELLBUTRIN XL) 300 MG 24 hr tablet, , Disp: , Rfl:   •  dicyclomine (BENTYL) 10 MG capsule, , Disp: , Rfl:   •  divalproex (DEPAKOTE) 500 MG DR tablet, TAKE ONE TABLET BY MOUTH TWICE A DAY, Disp: 60 tablet, Rfl: 2  •  eletriptan (RELPAX) 40 MG tablet, Take 1 tablet by mouth As Needed for Migraine., Disp: 10 tablet, Rfl: 3  •  EMGALITY 120 MG/ML prefilled syringe, INJECT CONTENTS OF 1 PEN UNDER THE SKIN INTO THE APPROPRIATE AREA AS DIRECTED EVERY 30 DAYS., Disp: 1 each, Rfl: 10  •  gabapentin (NEURONTIN) 600 MG tablet, Take 2 tablets by mouth 2 (Two) Times a Day., Disp: 360 tablet, Rfl: 3  •  ondansetron (ZOFRAN) 4 MG tablet, Take 1 tablet by mouth As Needed for Nausea or Vomiting., Disp: 15 tablet, Rfl: 0  •  SUMAtriptan (IMITREX) 100 MG tablet, Take 1 tablet at the onset of a migraine, may repeat once after 2 hours. Not exceeding 2 tablets in a 24 hour period., Disp: 10 tablet, Rfl: 3  •  SUMAtriptan (IMITREX) 6 MG/0.5ML injection, Inject prescribed dose at onset of headache. May repeat dose one time in 1 hour(s) if headache not relieved., Disp: 0.5 mL, Rfl: 5  •  traZODone (DESYREL) 50 MG tablet, , Disp: , Rfl:   •  TROKENDI  MG capsule sustained-release 24 hr, TAKE ONE CAPSULE BY MOUTH DAILY, Disp: 90 capsule, Rfl: 2  •  UBROGEPANT tablet, Take 50 mg by mouth Daily., Disp: , Rfl:   •  VRAYLAR 1.5 MG capsule capsule, , Disp: , Rfl:   •  OnabotulinumtoxinA 200 units  "reconstituted solution, Inject 200 units IM into head neck shoulders every 12 weeks per FDA protocol Dx G43.719, Disp: 1 each, Rfl: 3  •  Vortioxetine HBr (TRINTELLIX) 20 MG tablet, Take 10 mg by mouth Daily., Disp: , Rfl:    Past Medical History:   Diagnosis Date   • History of anxiety    • History of hyperlipidemia    • Migraines    • Tennis elbow    • Visual disturbances       Past Surgical History:   Procedure Laterality Date   • HYSTERECTOMY     • SHOULDER SURGERY Left 02/11/2020   • TUBAL ABDOMINAL LIGATION        Family History   Problem Relation Age of Onset   • Hypertension Father    • Heart disease Father    • Diabetes Father    • Hypertension Maternal Grandmother    • Breast cancer Maternal Grandmother    • Alzheimer's disease Other    • Parkinsonism Other         Parkinsons Disease   • Heart disease Other         Congenital   • Cancer Other         Review of Systems   Constitutional: Negative.    HENT: Negative.    Eyes: Negative.    Respiratory: Negative.    Cardiovascular: Negative.    Gastrointestinal: Negative.    Endocrine: Negative.    Genitourinary: Negative.    Musculoskeletal: Negative.    Skin: Negative.    Allergic/Immunologic: Negative.    Neurological: Positive for speech difficulty and headache.   Hematological: Negative.    Psychiatric/Behavioral: Negative.      Objective:    /76   Pulse 69   Temp 97.8 °F (36.6 °C)   Ht 152.4 cm (60\")   Wt 63 kg (139 lb)   SpO2 99%   BMI 27.15 kg/m²     Neurology Exam:  General apperance: NAD.     Mental status: Alert, awake and oriented to time place and person.    Recent and Remote memory: Can recall 3/3 objects at 5 minutes. Can recall historical events.     Attention span and Concentration: Serial 7s: Normal.     Fund of knowledge:  Normal.     Language and Speech: No aphasia or dysarthria.    Naming , Repitition and Comprehension:  Can name objects, repeat a sentence and follow commands. Speech is clear and fluent with good repetition, " comprehension, and naming.    CN II to XII: Intact.    Opthalmoscopic Exam: No papilledema.    Motor:  Right UE muscle strength 5/5. Normal tone.     Left UE muscle strength 5/5. Normal tone.      Right LE muscle strength5/5. Normal tone.     Left LE muscle strength 5/5. Normal tone.      Sensory: Normal light touch, vibration and pinprick sensation bilaterally.    DTRs: 2+ bilaterally.    Babinski: Negative bilaterally.    Co-ordination: Normal finger-to-nose, heel to shin B/L.    Rhomberg: Negative.    Gait: Normal.    Cardiovascular: Regular rate and rhythm without murmur, gallop or rub.    Assessment and Plan:  1. Intractable chronic migraine without aura and without status migrainosus  -She reports that after the first Botox injection for about 2 and half to 3 weeks, the migraine and almost completely resolved.  This is an excellent sign and I have reassured her that with continued Botox treatment, the migraine should reduce in intensity and frequency. Continue with Depakote, Trokendi and Emgality for now. I will see her back in 6 weeks for second Botox injection.        - SUMAtriptan (IMITREX) 100 MG tablet; Take 1 tablet at the onset of a migraine, may repeat once after 2 hours. Not exceeding 2 tablets in a 24 hour period.  Dispense: 10 tablet; Refill: 3       I spent 25 minutes face to face with the patient and spent more than 50% of this time  in management, instructions and education regarding above mentioned diagnosis and also on counseling and discussing about taking medication regularly, possible side effects with medication use, importance of good sleep hygiene, good hydration and regular exercise.    No follow-ups on file.

## 2020-10-16 ENCOUNTER — PROCEDURE VISIT (OUTPATIENT)
Dept: NEUROLOGY | Facility: CLINIC | Age: 45
End: 2020-10-16

## 2020-10-16 VITALS
SYSTOLIC BLOOD PRESSURE: 108 MMHG | HEART RATE: 69 BPM | TEMPERATURE: 97.1 F | DIASTOLIC BLOOD PRESSURE: 82 MMHG | OXYGEN SATURATION: 99 % | HEIGHT: 60 IN | WEIGHT: 140 LBS | BODY MASS INDEX: 27.48 KG/M2

## 2020-10-16 DIAGNOSIS — G43.719 INTRACTABLE CHRONIC MIGRAINE WITHOUT AURA AND WITHOUT STATUS MIGRAINOSUS: Primary | ICD-10-CM

## 2020-10-16 PROCEDURE — 64615 CHEMODENERV MUSC MIGRAINE: CPT | Performed by: PSYCHIATRY & NEUROLOGY

## 2020-10-16 NOTE — PROGRESS NOTES
CC: Chronic migraines.    HPI : Patient is in clinic for --2nd- Botox injection.  Current headache frequency is approximately 8-10 headache days in a month with some headaches lasting for more than 4 hours.      Botox Procedure Note    Current headache frequency: 8-10__ headaches days / month.    The risks and benefits were discussed with the patient. The patient was given the opportunity to ask questions. Informed consent form was signed.    Onabotulinumtoxin A was reconstituted with 0.9% normal saline. All injections sites were prepped with alcohol swab. Approximately 5 units of botox were injected into each of the following sites bilaterally as per routine migraine botox injection PREEMPT protocol: , procerus, frontalis, temporalis, occipitalis, upper cervical paraspinals, and trapezius.    The patient tolerated the procedure well. There were no immediate complications. The patient will follow up in approximately 12 weeks for her next injection.    Total amount of onabotulinumtoxin A injected was 155 units with 45 units wasted.    Additional notes: She reports that frequency is slightly better but the intensity has improved significantly.  I have reassured her that after second injection, she will hopefully notice more reduction in intensity and frequency of migraines.  I will see her back for third Botox injection in 12 weeks.

## 2020-11-08 ENCOUNTER — APPOINTMENT (OUTPATIENT)
Dept: PREADMISSION TESTING | Facility: HOSPITAL | Age: 45
End: 2020-11-08

## 2020-11-08 ENCOUNTER — HOSPITAL ENCOUNTER (OUTPATIENT)
Dept: GENERAL RADIOLOGY | Facility: HOSPITAL | Age: 45
Discharge: HOME | End: 2020-11-08

## 2020-11-08 VITALS — BODY MASS INDEX: 27.09 KG/M2 | WEIGHT: 138 LBS | HEIGHT: 60 IN

## 2020-11-08 LAB
ALBUMIN SERPL-MCNC: 4.4 G/DL (ref 3.5–5.2)
ALBUMIN/GLOB SERPL: 2.3 G/DL
ALP SERPL-CCNC: 42 U/L (ref 39–117)
ALT SERPL W P-5'-P-CCNC: 8 U/L (ref 1–33)
ANION GAP SERPL CALCULATED.3IONS-SCNC: 9 MMOL/L (ref 5–15)
APTT PPP: 27 SECONDS (ref 24–37)
AST SERPL-CCNC: 12 U/L (ref 1–32)
BACTERIA UR QL AUTO: NORMAL /HPF
BASOPHILS # BLD AUTO: 0.07 10*3/MM3 (ref 0–0.2)
BASOPHILS NFR BLD AUTO: 0.7 % (ref 0–1.5)
BILIRUB SERPL-MCNC: <0.2 MG/DL (ref 0–1.2)
BILIRUB UR QL STRIP: NEGATIVE
BUN SERPL-MCNC: 18 MG/DL (ref 6–20)
BUN/CREAT SERPL: 18.8 (ref 7–25)
CALCIUM SPEC-SCNC: 9.1 MG/DL (ref 8.6–10.5)
CHLORIDE SERPL-SCNC: 108 MMOL/L (ref 98–107)
CLARITY UR: CLEAR
CO2 SERPL-SCNC: 25 MMOL/L (ref 22–29)
COLOR UR: YELLOW
CREAT SERPL-MCNC: 0.96 MG/DL (ref 0.57–1)
DEPRECATED RDW RBC AUTO: 50.8 FL (ref 37–54)
EOSINOPHIL # BLD AUTO: 0.61 10*3/MM3 (ref 0–0.4)
EOSINOPHIL NFR BLD AUTO: 5.9 % (ref 0.3–6.2)
ERYTHROCYTE [DISTWIDTH] IN BLOOD BY AUTOMATED COUNT: 13.6 % (ref 12.3–15.4)
GFR SERPL CREATININE-BSD FRML MDRD: 63 ML/MIN/1.73
GLOBULIN UR ELPH-MCNC: 1.9 GM/DL
GLUCOSE SERPL-MCNC: 88 MG/DL (ref 65–99)
GLUCOSE UR STRIP-MCNC: NEGATIVE MG/DL
HBA1C MFR BLD: 4.7 % (ref 4.8–5.6)
HCT VFR BLD AUTO: 36.9 % (ref 34–46.6)
HGB BLD-MCNC: 11.8 G/DL (ref 12–15.9)
HGB UR QL STRIP.AUTO: NEGATIVE
HYALINE CASTS UR QL AUTO: NORMAL /LPF
IMM GRANULOCYTES # BLD AUTO: 0.06 10*3/MM3 (ref 0–0.05)
IMM GRANULOCYTES NFR BLD AUTO: 0.6 % (ref 0–0.5)
INR PPP: 1.06 (ref 0.85–1.16)
KETONES UR QL STRIP: ABNORMAL
LEUKOCYTE ESTERASE UR QL STRIP.AUTO: ABNORMAL
LYMPHOCYTES # BLD AUTO: 3.23 10*3/MM3 (ref 0.7–3.1)
LYMPHOCYTES NFR BLD AUTO: 31.2 % (ref 19.6–45.3)
MCH RBC QN AUTO: 32.5 PG (ref 26.6–33)
MCHC RBC AUTO-ENTMCNC: 32 G/DL (ref 31.5–35.7)
MCV RBC AUTO: 101.7 FL (ref 79–97)
MONOCYTES # BLD AUTO: 0.73 10*3/MM3 (ref 0.1–0.9)
MONOCYTES NFR BLD AUTO: 7 % (ref 5–12)
NEUTROPHILS NFR BLD AUTO: 5.66 10*3/MM3 (ref 1.7–7)
NEUTROPHILS NFR BLD AUTO: 54.6 % (ref 42.7–76)
NITRITE UR QL STRIP: NEGATIVE
NRBC BLD AUTO-RTO: 0 /100 WBC (ref 0–0.2)
PH UR STRIP.AUTO: 6 [PH] (ref 5–8)
PLATELET # BLD AUTO: 394 10*3/MM3 (ref 140–450)
PMV BLD AUTO: 9.7 FL (ref 6–12)
POTASSIUM SERPL-SCNC: 3.9 MMOL/L (ref 3.5–5.2)
PROT SERPL-MCNC: 6.3 G/DL (ref 6–8.5)
PROT UR QL STRIP: NEGATIVE
PROTHROMBIN TIME: 13.5 SECONDS (ref 11.5–14)
RBC # BLD AUTO: 3.63 10*6/MM3 (ref 3.77–5.28)
RBC # UR: NORMAL /HPF
REF LAB TEST METHOD: NORMAL
SODIUM SERPL-SCNC: 142 MMOL/L (ref 136–145)
SP GR UR STRIP: 1.03 (ref 1–1.03)
SQUAMOUS #/AREA URNS HPF: NORMAL /HPF
UROBILINOGEN UR QL STRIP: ABNORMAL
WBC # BLD AUTO: 10.36 10*3/MM3 (ref 3.4–10.8)
WBC UR QL AUTO: NORMAL /HPF

## 2020-11-08 PROCEDURE — 85610 PROTHROMBIN TIME: CPT | Performed by: ORTHOPAEDIC SURGERY

## 2020-11-08 PROCEDURE — 36415 COLL VENOUS BLD VENIPUNCTURE: CPT

## 2020-11-08 PROCEDURE — 85025 COMPLETE CBC W/AUTO DIFF WBC: CPT | Performed by: ORTHOPAEDIC SURGERY

## 2020-11-08 PROCEDURE — C9803 HOPD COVID-19 SPEC COLLECT: HCPCS

## 2020-11-08 PROCEDURE — 71046 X-RAY EXAM CHEST 2 VIEWS: CPT

## 2020-11-08 PROCEDURE — 85730 THROMBOPLASTIN TIME PARTIAL: CPT | Performed by: ORTHOPAEDIC SURGERY

## 2020-11-08 PROCEDURE — 87086 URINE CULTURE/COLONY COUNT: CPT | Performed by: ORTHOPAEDIC SURGERY

## 2020-11-08 PROCEDURE — 93010 ELECTROCARDIOGRAM REPORT: CPT | Performed by: INTERNAL MEDICINE

## 2020-11-08 PROCEDURE — 93005 ELECTROCARDIOGRAM TRACING: CPT

## 2020-11-08 PROCEDURE — 81001 URINALYSIS AUTO W/SCOPE: CPT | Performed by: ORTHOPAEDIC SURGERY

## 2020-11-08 PROCEDURE — U0004 COV-19 TEST NON-CDC HGH THRU: HCPCS

## 2020-11-08 PROCEDURE — 80053 COMPREHEN METABOLIC PANEL: CPT | Performed by: ORTHOPAEDIC SURGERY

## 2020-11-08 PROCEDURE — 83036 HEMOGLOBIN GLYCOSYLATED A1C: CPT | Performed by: ORTHOPAEDIC SURGERY

## 2020-11-08 NOTE — PAT
Patient to apply Chlorhexadine wipes  to surgical area (as instructed) the night before procedure and the AM of procedure. Wipes provided.    Patient instructed to drink 20 ounces (or until full) of Gatorade and it needs to be completed 1 hour before given arrival time for procedure (NO RED Gatorade)    Patient verbalized understanding.    Patient given a prescription for Benzol Peroxide 5% wash during PAT visit.  Instructed them to fill the prescription or  from [facility] pharmacy if was submitted electronically by the surgeon's office.  Verbal and written instructions given regarding proper use of the Benzoyl Peroxide wash given to patient and/or famlily during PAT visit. Patient/family also instructed to complete checklist and return it to Pre-op on the day of surgery.  Patient and/or family verbalized understanding.      Additionally, reinforced with patient to acquire this prescription from the [facility] retail pharmacy before leaving the hospital after PAT visit due to the potential unavailability at local pharmacies.      It was noted during Pre Admission Testing that patient was wearing some form of fingernail polish (gel/regular) and/or acrylic/artificial nails.  Patient was told that polish and/or artificial nails must be removed for surgery.  If a patient had recent manicure, and would rather not remove polish or artificial nails. Then the minimum requirement is that the polish/artificial nails must be removed from the middle finger on each hand.  Patient verbalized understanding.    If patient was having surgery on an upper extremity, then the patient was instructed that fingernail polish/artificial fingernails must be removed for surgery.  NO EXCEPTIONS.  Patient verbalized understanding.    If patient was having surgery on a lower extremity, then the patient was instructed that toenail polish on both extremities must be removed for surgery.  NO EXCEPTIONS. Patient verbalized understanding.

## 2020-11-09 ENCOUNTER — ANESTHESIA EVENT (OUTPATIENT)
Dept: PERIOP | Facility: HOSPITAL | Age: 45
End: 2020-11-09

## 2020-11-09 LAB
QT INTERVAL: 380 MS
QTC INTERVAL: 449 MS
SARS-COV-2 RNA RESP QL NAA+PROBE: NOT DETECTED

## 2020-11-09 RX ORDER — SODIUM CHLORIDE 0.9 % (FLUSH) 0.9 %
10 SYRINGE (ML) INJECTION AS NEEDED
Status: CANCELLED | OUTPATIENT
Start: 2020-11-09

## 2020-11-09 RX ORDER — FAMOTIDINE 10 MG/ML
20 INJECTION, SOLUTION INTRAVENOUS ONCE
Status: CANCELLED | OUTPATIENT
Start: 2020-11-09 | End: 2020-11-09

## 2020-11-09 RX ORDER — SODIUM CHLORIDE 0.9 % (FLUSH) 0.9 %
10 SYRINGE (ML) INJECTION EVERY 12 HOURS SCHEDULED
Status: CANCELLED | OUTPATIENT
Start: 2020-11-09

## 2020-11-10 ENCOUNTER — HOSPITAL ENCOUNTER (OUTPATIENT)
Facility: HOSPITAL | Age: 45
Setting detail: HOSPITAL OUTPATIENT SURGERY
Discharge: HOME | End: 2020-11-10
Attending: ORTHOPAEDIC SURGERY | Admitting: ORTHOPAEDIC SURGERY

## 2020-11-10 ENCOUNTER — ANESTHESIA (OUTPATIENT)
Dept: PERIOP | Facility: HOSPITAL | Age: 45
End: 2020-11-10

## 2020-11-10 VITALS
DIASTOLIC BLOOD PRESSURE: 75 MMHG | OXYGEN SATURATION: 98 % | HEIGHT: 60 IN | TEMPERATURE: 98.3 F | BODY MASS INDEX: 27.09 KG/M2 | RESPIRATION RATE: 16 BRPM | HEART RATE: 93 BPM | SYSTOLIC BLOOD PRESSURE: 104 MMHG | WEIGHT: 138 LBS

## 2020-11-10 LAB — BACTERIA SPEC AEROBE CULT: NO GROWTH

## 2020-11-10 PROCEDURE — 76942 ECHO GUIDE FOR BIOPSY: CPT | Performed by: ORTHOPAEDIC SURGERY

## 2020-11-10 PROCEDURE — C1713 ANCHOR/SCREW BN/BN,TIS/BN: HCPCS | Performed by: ORTHOPAEDIC SURGERY

## 2020-11-10 PROCEDURE — 25010000002 DEXAMETHASONE PER 1 MG: Performed by: NURSE ANESTHETIST, CERTIFIED REGISTERED

## 2020-11-10 PROCEDURE — 25010000002 BUPRENORPHINE PER 0.1 MG: Performed by: NURSE ANESTHETIST, CERTIFIED REGISTERED

## 2020-11-10 PROCEDURE — L2785 DROP LOCK RETAINER EACH: HCPCS | Performed by: ORTHOPAEDIC SURGERY

## 2020-11-10 PROCEDURE — 25010000003 CEFAZOLIN IN DEXTROSE 2-4 GM/100ML-% SOLUTION: Performed by: ORTHOPAEDIC SURGERY

## 2020-11-10 PROCEDURE — 25010000002 FENTANYL CITRATE (PF) 100 MCG/2ML SOLUTION: Performed by: NURSE ANESTHETIST, CERTIFIED REGISTERED

## 2020-11-10 PROCEDURE — 25010000002 DEXAMETHASONE SODIUM PHOSPHATE 10 MG/ML SOLUTION: Performed by: NURSE ANESTHETIST, CERTIFIED REGISTERED

## 2020-11-10 PROCEDURE — 25010000002 ONDANSETRON PER 1 MG: Performed by: NURSE ANESTHETIST, CERTIFIED REGISTERED

## 2020-11-10 DEVICE — DX FIBERTAK SUTURE ANCHOR, ST & NDLS
Type: IMPLANTABLE DEVICE | Site: ELBOW | Status: FUNCTIONAL
Brand: ARTHREX®

## 2020-11-10 RX ORDER — FENTANYL CITRATE 50 UG/ML
50 INJECTION, SOLUTION INTRAMUSCULAR; INTRAVENOUS
Status: DISCONTINUED | OUTPATIENT
Start: 2020-11-10 | End: 2020-11-10 | Stop reason: HOSPADM

## 2020-11-10 RX ORDER — FENTANYL CITRATE 50 UG/ML
INJECTION, SOLUTION INTRAMUSCULAR; INTRAVENOUS
Status: COMPLETED | OUTPATIENT
Start: 2020-11-10 | End: 2020-11-10

## 2020-11-10 RX ORDER — HYDROMORPHONE HYDROCHLORIDE 1 MG/ML
0.5 INJECTION, SOLUTION INTRAMUSCULAR; INTRAVENOUS; SUBCUTANEOUS
Status: DISCONTINUED | OUTPATIENT
Start: 2020-11-10 | End: 2020-11-10 | Stop reason: HOSPADM

## 2020-11-10 RX ORDER — SODIUM CHLORIDE 0.9 % (FLUSH) 0.9 %
3-10 SYRINGE (ML) INJECTION AS NEEDED
Status: DISCONTINUED | OUTPATIENT
Start: 2020-11-10 | End: 2020-11-10 | Stop reason: HOSPADM

## 2020-11-10 RX ORDER — SODIUM CHLORIDE 0.9 % (FLUSH) 0.9 %
3 SYRINGE (ML) INJECTION EVERY 12 HOURS SCHEDULED
Status: DISCONTINUED | OUTPATIENT
Start: 2020-11-10 | End: 2020-11-10 | Stop reason: HOSPADM

## 2020-11-10 RX ORDER — OXYCODONE HYDROCHLORIDE 5 MG/1
5-10 TABLET ORAL EVERY 4 HOURS PRN
Qty: 40 TABLET | Refills: 0 | Status: SHIPPED | OUTPATIENT
Start: 2020-11-10 | End: 2021-01-15

## 2020-11-10 RX ORDER — DEXAMETHASONE SODIUM PHOSPHATE 4 MG/ML
INJECTION, SOLUTION INTRA-ARTICULAR; INTRALESIONAL; INTRAMUSCULAR; INTRAVENOUS; SOFT TISSUE AS NEEDED
Status: DISCONTINUED | OUTPATIENT
Start: 2020-11-10 | End: 2020-11-10 | Stop reason: SURG

## 2020-11-10 RX ORDER — HYDROCODONE BITARTRATE AND ACETAMINOPHEN 5; 325 MG/1; MG/1
1 TABLET ORAL ONCE AS NEEDED
Status: DISCONTINUED | OUTPATIENT
Start: 2020-11-10 | End: 2020-11-10 | Stop reason: HOSPADM

## 2020-11-10 RX ORDER — ONDANSETRON 2 MG/ML
INJECTION INTRAMUSCULAR; INTRAVENOUS AS NEEDED
Status: DISCONTINUED | OUTPATIENT
Start: 2020-11-10 | End: 2020-11-10 | Stop reason: SURG

## 2020-11-10 RX ORDER — SCOLOPAMINE TRANSDERMAL SYSTEM 1 MG/1
1 PATCH, EXTENDED RELEASE TRANSDERMAL
Status: DISCONTINUED | OUTPATIENT
Start: 2020-11-10 | End: 2020-11-10 | Stop reason: HOSPADM

## 2020-11-10 RX ORDER — SODIUM CHLORIDE, SODIUM LACTATE, POTASSIUM CHLORIDE, CALCIUM CHLORIDE 600; 310; 30; 20 MG/100ML; MG/100ML; MG/100ML; MG/100ML
9 INJECTION, SOLUTION INTRAVENOUS CONTINUOUS
Status: DISCONTINUED | OUTPATIENT
Start: 2020-11-10 | End: 2020-11-10 | Stop reason: HOSPADM

## 2020-11-10 RX ORDER — LABETALOL HYDROCHLORIDE 5 MG/ML
5 INJECTION, SOLUTION INTRAVENOUS
Status: DISCONTINUED | OUTPATIENT
Start: 2020-11-10 | End: 2020-11-10 | Stop reason: HOSPADM

## 2020-11-10 RX ORDER — FAMOTIDINE 20 MG/1
20 TABLET, FILM COATED ORAL ONCE
Status: COMPLETED | OUTPATIENT
Start: 2020-11-10 | End: 2020-11-10

## 2020-11-10 RX ORDER — PREGABALIN 75 MG/1
75 CAPSULE ORAL ONCE
Status: COMPLETED | OUTPATIENT
Start: 2020-11-10 | End: 2020-11-10

## 2020-11-10 RX ORDER — LIDOCAINE HYDROCHLORIDE 10 MG/ML
0.5 INJECTION, SOLUTION EPIDURAL; INFILTRATION; INTRACAUDAL; PERINEURAL ONCE AS NEEDED
Status: COMPLETED | OUTPATIENT
Start: 2020-11-10 | End: 2020-11-10

## 2020-11-10 RX ORDER — HYDRALAZINE HYDROCHLORIDE 20 MG/ML
5 INJECTION INTRAMUSCULAR; INTRAVENOUS
Status: DISCONTINUED | OUTPATIENT
Start: 2020-11-10 | End: 2020-11-10 | Stop reason: HOSPADM

## 2020-11-10 RX ORDER — ACETAMINOPHEN 500 MG
1000 TABLET ORAL ONCE
Status: COMPLETED | OUTPATIENT
Start: 2020-11-10 | End: 2020-11-10

## 2020-11-10 RX ORDER — CEFAZOLIN SODIUM 2 G/100ML
2 INJECTION, SOLUTION INTRAVENOUS ONCE
Status: COMPLETED | OUTPATIENT
Start: 2020-11-10 | End: 2020-11-10

## 2020-11-10 RX ORDER — BUPIVACAINE HYDROCHLORIDE 2.5 MG/ML
INJECTION, SOLUTION EPIDURAL; INFILTRATION; INTRACAUDAL
Status: COMPLETED | OUTPATIENT
Start: 2020-11-10 | End: 2020-11-10

## 2020-11-10 RX ORDER — MAGNESIUM HYDROXIDE 1200 MG/15ML
LIQUID ORAL AS NEEDED
Status: DISCONTINUED | OUTPATIENT
Start: 2020-11-10 | End: 2020-11-10 | Stop reason: HOSPADM

## 2020-11-10 RX ORDER — DEXAMETHASONE SODIUM PHOSPHATE 10 MG/ML
INJECTION, SOLUTION INTRAMUSCULAR; INTRAVENOUS
Status: COMPLETED | OUTPATIENT
Start: 2020-11-10 | End: 2020-11-10

## 2020-11-10 RX ORDER — ONDANSETRON 2 MG/ML
4 INJECTION INTRAMUSCULAR; INTRAVENOUS ONCE AS NEEDED
Status: DISCONTINUED | OUTPATIENT
Start: 2020-11-10 | End: 2020-11-10 | Stop reason: HOSPADM

## 2020-11-10 RX ORDER — MEPERIDINE HYDROCHLORIDE 25 MG/ML
12.5 INJECTION INTRAMUSCULAR; INTRAVENOUS; SUBCUTANEOUS
Status: DISCONTINUED | OUTPATIENT
Start: 2020-11-10 | End: 2020-11-10 | Stop reason: HOSPADM

## 2020-11-10 RX ORDER — NALOXONE HCL 0.4 MG/ML
0.4 VIAL (ML) INJECTION AS NEEDED
Status: DISCONTINUED | OUTPATIENT
Start: 2020-11-10 | End: 2020-11-10 | Stop reason: HOSPADM

## 2020-11-10 RX ORDER — IPRATROPIUM BROMIDE AND ALBUTEROL SULFATE 2.5; .5 MG/3ML; MG/3ML
3 SOLUTION RESPIRATORY (INHALATION) ONCE AS NEEDED
Status: DISCONTINUED | OUTPATIENT
Start: 2020-11-10 | End: 2020-11-10 | Stop reason: HOSPADM

## 2020-11-10 RX ORDER — BUPRENORPHINE HYDROCHLORIDE 0.32 MG/ML
INJECTION INTRAMUSCULAR; INTRAVENOUS
Status: COMPLETED | OUTPATIENT
Start: 2020-11-10 | End: 2020-11-10

## 2020-11-10 RX ADMIN — PREGABALIN 75 MG: 75 CAPSULE ORAL at 12:46

## 2020-11-10 RX ADMIN — DEXAMETHASONE SODIUM PHOSPHATE 2 MG: 10 INJECTION INTRAMUSCULAR; INTRAVENOUS at 13:19

## 2020-11-10 RX ADMIN — SODIUM CHLORIDE, POTASSIUM CHLORIDE, SODIUM LACTATE AND CALCIUM CHLORIDE 9 ML/HR: 600; 310; 30; 20 INJECTION, SOLUTION INTRAVENOUS at 12:46

## 2020-11-10 RX ADMIN — BUPIVACAINE HYDROCHLORIDE 30 ML: 2.5 INJECTION, SOLUTION EPIDURAL; INFILTRATION; INTRACAUDAL; PERINEURAL at 13:19

## 2020-11-10 RX ADMIN — ONDANSETRON 4 MG: 2 INJECTION INTRAMUSCULAR; INTRAVENOUS at 15:29

## 2020-11-10 RX ADMIN — CEFAZOLIN SODIUM 2 G: 2 INJECTION, SOLUTION INTRAVENOUS at 14:41

## 2020-11-10 RX ADMIN — DEXAMETHASONE SODIUM PHOSPHATE 4 MG: 4 INJECTION, SOLUTION INTRAMUSCULAR; INTRAVENOUS at 15:29

## 2020-11-10 RX ADMIN — BUPRENORPHINE HYDROCHLORIDE 0.3 MG: 0.32 INJECTION INTRAMUSCULAR; INTRAVENOUS at 13:19

## 2020-11-10 RX ADMIN — SCOPALAMINE 1 PATCH: 1 PATCH, EXTENDED RELEASE TRANSDERMAL at 12:55

## 2020-11-10 RX ADMIN — ACETAMINOPHEN 1000 MG: 500 TABLET ORAL at 12:46

## 2020-11-10 RX ADMIN — LIDOCAINE HYDROCHLORIDE 0.5 ML: 10 INJECTION, SOLUTION EPIDURAL; INFILTRATION; INTRACAUDAL; PERINEURAL at 12:46

## 2020-11-10 RX ADMIN — FENTANYL CITRATE 100 MCG: 50 INJECTION, SOLUTION INTRAMUSCULAR; INTRAVENOUS at 13:19

## 2020-11-10 RX ADMIN — FAMOTIDINE 20 MG: 20 TABLET, FILM COATED ORAL at 12:46

## 2020-11-10 NOTE — ANESTHESIA PREPROCEDURE EVALUATION
Anesthesia Evaluation     history of anesthetic complications: PONV               Airway   Mallampati: I  TM distance: >3 FB  Neck ROM: full  No difficulty expected  Dental      Pulmonary    Cardiovascular     ECG reviewed        Neuro/Psych  (+) psychiatric history Depression,     GI/Hepatic/Renal/Endo      Musculoskeletal     Abdominal    Substance History      OB/GYN          Other                        Anesthesia Plan    ASA 2     general   (Block as approp)  intravenous induction     Anesthetic plan, all risks, benefits, and alternatives have been provided, discussed and informed consent has been obtained with: patient.    Plan discussed with CRNA.

## 2020-11-10 NOTE — ANESTHESIA POSTPROCEDURE EVALUATION
Patient: Molly Ferguson    Procedure Summary     Date: 11/10/20 [facility] / [facility]    Anesthesia Start: 1441 Anesthesia Stop: 1620    Procedure: LEFT ELBOW TENNIS ELBOW DEBRIDEMENT WITH ANCHOR REPAIR (Left Elbow) Diagnosis:       Lateral epicondylitis      (lateral epicondylitis)    Surgeon: Francisco Landers MD Provider: Nehemiah Poole MD    Anesthesia Type: general ASA Status: 2          Anesthesia Type: general    Vitals  Vitals Value Taken Time   /67 11/10/20 1630   Temp 98 °F (36.7 °C) 11/10/20 1625   Pulse 83 11/10/20 1639   Resp 16 11/10/20 1625   SpO2 97 % 11/10/20 1639   Vitals shown include unvalidated device data.        Post Anesthesia Care and Evaluation    Patient location during evaluation: PACU  Patient participation: complete - patient participated  Level of consciousness: sleepy but conscious  Pain score: 0  Pain management: adequate  Airway patency: patent  Anesthetic complications: No anesthetic complications  PONV Status: none  Cardiovascular status: hemodynamically stable and acceptable  Respiratory status: nonlabored ventilation and acceptable  Hydration status: acceptable

## 2020-11-10 NOTE — ANESTHESIA PROCEDURE NOTES
Airway  Urgency: elective    Date/Time: 11/10/2020 2:46 PM  Airway not difficult    General Information and Staff    Patient location during procedure: OR  CRNA: Valdemar Simon CRNA    Indications and Patient Condition  Indications for airway management: airway protection    Preoxygenated: yes  Mask difficulty assessment: 1 - vent by mask    Final Airway Details  Final airway type: supraglottic airway      Successful airway: classic  Size 3    Number of attempts at approach: 1  Assessment: lips, teeth, and gum same as pre-op    Additional Comments  LMA placed without difficulty, ventilation with assist, equal breath sounds and symmetric chest rise and fall

## 2020-11-10 NOTE — ANESTHESIA PROCEDURE NOTES
Peripheral Block      Patient reassessed immediately prior to procedure    Patient location during procedure: pre-op  Start time: 11/10/2020 1:16 PM  Reason for block: at surgeon's request and post-op pain management  Performed by  CRNA: Lul Chahal, CRNA  Assisted by: Eileen Moser RN  Preanesthetic Checklist  Completed: patient identified, site marked, surgical consent, pre-op evaluation, timeout performed, risks and benefits discussed and monitors and equipment checked  Prep:  Pt Position: supine  Sterile barriers:cap, gloves, mask and sterile barriers  Prep: ChloraPrep  Patient monitoring: blood pressure monitoring, continuous pulse oximetry and EKG  Procedure  Sedation:yes  Performed under: local infiltration  Guidance:ultrasound guided  Images:still images obtained, printed/placed on chart    Laterality:left  Block Type:infraclavicular  Injection Technique:single-shot  Needle Type:echogenic and short-bevel  Needle Gauge:20 G  Resistance on Injection: none    Medications Used: fentaNYL citrate (PF) (SUBLIMAZE) injection, 100 mcg  buprenorphine (BUPRENEX) injection, 0.3 mg  dexamethasone sodium phosphate injection, 2 mg  bupivacaine PF (MARCAINE) 0.25 % injection, 30 mL  Med admintered at 11/10/2020 1:19 PM      Medications  Preservative Free Saline:5ml    Post Assessment  Injection Assessment: negative aspiration for heme, no paresthesia on injection and incremental injection  Patient Tolerance:comfortable throughout block  Complications:no  Additional Notes  Procedure:                                                 The affected upper extremity was adducted within the patients ROM.  The US probe was placed approximately at the distal inferior third of the clavicle in a cephalad to caudad orientation.  The brachial plexus, subclavian artery and vein where visualized and the patient was marked and sterile prep and drape where completed.  The pt was anesthetized with  IV Sedation( see meds).  Skin and  cutaneous tissue was infiltrated and anesthetized with 1% Lidocaine 3 mls via a 25g needle.   A 4 inch B-Azevedo echogenic ToAdvanced LEDs 360 degree needle was placed inferiorly to the clavicle in a caudad direction, in plane US technique.  The needle was visualized as it passed through Pectoralis Major and to the posterior aspect of the artery where LA was placed and spread was visualized. Injection of LA was incremental, and negative aspiration every 5 MLs.  Injection pressure was also noted as minimal and patient denied pain on injection.

## 2020-11-10 NOTE — OP NOTE
PREOPERATIVE DIAGNOSIS: L elbow chronic refractory lateral epicondylitis    POSTOPERATIVE DIAGNOSES:    1. same     PROCEDURES PERFORMED:  1. Left elbow lateral epicondylitis debridement with anchor repair         SURGEON: Francisco Landers MD         ASSISTANT: Roma Patel PA-C  ** Please note the physician assistant was medically necessary to assist with positioning retraction, arm positioning, care of soft tissues and closure     ANESTHESIA: General plus block.      ESTIMATED BLOOD LOSS:     COMPLICATIONS: None.      TOURNIQUET TIME: approx 1 hours at 250 mmHg.     IMPLANT:  Arthrex fibertak (DX), x 2          INDICATIONS: This is a 45-year-old female with above diagnosis. Risks, benefits, and alternatives were discussed with the patient and the patient understood and wished to proceed.      DESCRIPTION OF PROCEDURE: On the day of surgery, the patient identified the left elbow as the correct operative extremity. This was initialed by the surgeon with the patient's acknowledgment. She underwent placement of a regional block catheter and was taken to the operating room and placed in the supine position. Upon induction of adequate anesthesia, the splint was removed and the upper extremity was prepped and draped in the usual sterile fashion. A sterile tourniquet was applied. Time-out confirmed the correct patient and operative extremity and that antibiotics were on board. The arm was exsanguinated with an Esmarch and the tourniquet inflated to 250 mmHg. A lateral incision was made around the elbow and was carried sharply through the skin and subcutaneous tissue. Anterior and posterior flaps were developed over the forearm extensor fascia. We then incised the fascia over the EDC carrying it down to the ECRB underlying it.  The proliferative degenerative fibroblastic tissue was removed sharply.   We then trephined the lateral epicondyle with a drill.  We then placed 2 Arthrex fibertak anchors.  We tied the  healthy portion of the tendon down to the epicondyle. We then oversewed the tendon with a 0-vicryl in a running locking fashion     Dermis closed with 2-0 vicryl and skin with 3-0 nylon.       A sterile dressing was placed. We placed  The patient in a wrist splint.  Anesthesia was reversed and the patient was taken to the recovery room in stable condition. All instrument, needle, and sponge counts were correct.

## 2020-11-10 NOTE — H&P
Pre-Op H&P  Molly Ferguson  [id number]  1975      Chief complaint: Left elbow pain      Subjective:  Patient is a 45 y.o.female presents for scheduled surgery by Dr. Landers. She anticipates a LEFT ELBOW TENNIS ELBOW DEBRIDEMENT WITH ANCHOR REPAIR today.  She has been dealing with her left elbow pain for about a year now.  She reports she has persistent pain regardless of positioning or movement.    Review of Systems:  Constitutional-- No fever, chills or sweats. No fatigue.  CV-- No chest pain, palpitation or syncope  Resp-- No SOB, cough, hemoptysis  GI: +constipation/diarrhea  Skin--No rashes or lesions      Allergies:   Allergies   Allergen Reactions   • Codeine Shortness Of Breath   • Nickel Hives   • Penicillins Rash         Home Meds:  Medications Prior to Admission   Medication Sig Dispense Refill Last Dose   • benzoyl peroxide 5 % external liquid Apply  topically to the appropriate area as directed. 148 g 0 11/10/2020 at 0800   • buPROPion XL (WELLBUTRIN XL) 300 MG 24 hr tablet Take 300 mg by mouth Every Morning.   11/10/2020 at 0800   • dicyclomine (BENTYL) 10 MG capsule Take 20 mg by mouth 4 (Four) Times a Day Before Meals & at Bedtime.   11/10/2020 at 0800   • divalproex (DEPAKOTE) 500 MG DR tablet TAKE ONE TABLET BY MOUTH TWICE A DAY (Patient taking differently: Take 500 mg by mouth 2 (Two) Times a Day.) 60 tablet 2 11/10/2020 at 0800   • eletriptan (RELPAX) 40 MG tablet Take 1 tablet by mouth As Needed for Migraine. 10 tablet 3 Past Week at Unknown time   • gabapentin (NEURONTIN) 600 MG tablet Take 2 tablets by mouth 2 (Two) Times a Day. 360 tablet 3 11/10/2020 at 0800   • SUMAtriptan (IMITREX) 100 MG tablet Take 1 tablet at the onset of a migraine, may repeat once after 2 hours. Not exceeding 2 tablets in a 24 hour period. (Patient taking differently: Take 100 mg by mouth 1 (One) Time As Needed for Migraine. Take 1 tablet at the onset of a migraine, may repeat once after 2 hours. Not exceeding  2 tablets in a 24 hour period.) 10 tablet 3 Past Week at Unknown time   • SUMAtriptan (IMITREX) 6 MG/0.5ML injection Inject prescribed dose at onset of headache. May repeat dose one time in 1 hour(s) if headache not relieved. 0.5 mL 5 Past Month at Unknown time   • traZODone (DESYREL) 50 MG tablet Take 100 mg by mouth Every Night.   2020 at 2000   • TROKENDI  MG capsule sustained-release 24 hr TAKE ONE CAPSULE BY MOUTH DAILY (Patient taking differently: Take 200 mg by mouth Daily.) 90 capsule 2 11/10/2020 at 0800   • UBROGEPANT tablet Take 50 mg by mouth As Needed (migraine).   Past Week at Unknown time   • VRAYLAR 1.5 MG capsule capsule Take 1.5 mg by mouth Daily.   2020 at 2000   • EMGALITY 120 MG/ML prefilled syringe INJECT CONTENTS OF 1 PEN UNDER THE SKIN INTO THE APPROPRIATE AREA AS DIRECTED EVERY 30 DAYS. (Patient taking differently: Inject 120 mg under the skin into the appropriate area as directed Every 30 (Thirty) Days.) 1 each 10 2020   • OnabotulinumtoxinA 200 units reconstituted solution Inject 200 units IM into head neck shoulders every 12 weeks per FDA protocol Dx G43.719 (Patient taking differently: Inject 200 Units into the appropriate muscle as directed by prescriber As Needed. Inject 200 units IM into head neck shoulders every 12 weeks per FDA protocol Dx G43.719) 1 each 3 10/19/2020   • ondansetron (ZOFRAN) 4 MG tablet Take 1 tablet by mouth As Needed for Nausea or Vomiting. 15 tablet 0          PMH:   Past Medical History:   Diagnosis Date   • Depression    • History of anxiety    • History of hyperlipidemia    • Migraines    • PONV (postoperative nausea and vomiting)    • Tennis elbow    • Visual disturbances      PSH:    Past Surgical History:   Procedure Laterality Date   •  SECTION      x 2   • COLONOSCOPY     • ENDOSCOPY     • HYSTERECTOMY     • SHOULDER SURGERY Left 2020   • TUBAL ABDOMINAL LIGATION         Immunization History:  Influenza:  "2020  Pneumococcal: No  Tetanus: No      Social History:   Tobacco:   Social History     Tobacco Use   Smoking Status Current Some Day Smoker   • Packs/day: 0.25   • Years: 5.00   • Pack years: 1.25   • Types: Cigarettes   Smokeless Tobacco Never Used      Alcohol:     Social History     Substance and Sexual Activity   Alcohol Use Yes    Comment: socially         Physical Exam:/71 (BP Location: Right arm, Patient Position: Lying)   Pulse 76   Temp 99.4 °F (37.4 °C) (Temporal)   Resp 20   Ht 152.4 cm (60\")   Wt 62.6 kg (138 lb)   BMI 26.95 kg/m²       General Appearance:    Alert, cooperative, no distress, appears stated age   Head:    Normocephalic, without obvious abnormality, atraumatic   Lungs:     Clear to auscultation bilaterally, respirations unlabored    Heart:   Regular rate and rhythm, S1 and S2 normal, no murmur, rub    or gallop    Abdomen:    Soft without tenderness   Breast Exam:    deferred   Genitalia:    deferred   Extremities:   Extremities normal, atraumatic, no cyanosis or edema   Skin:   Skin color, texture, turgor normal, no rashes or lesions   Neurologic:   Grossly intact     Results Review:     LABS:  Lab Results   Component Value Date    WBC 10.36 11/08/2020    HGB 11.8 (L) 11/08/2020    HCT 36.9 11/08/2020    .7 (H) 11/08/2020     11/08/2020    NEUTROABS 5.66 11/08/2020    GLUCOSE 88 11/08/2020    BUN 18 11/08/2020    CREATININE 0.96 11/08/2020    EGFRIFNONA 63 11/08/2020     11/08/2020    K 3.9 11/08/2020     (H) 11/08/2020    CO2 25.0 11/08/2020    CALCIUM 9.1 11/08/2020    ALBUMIN 4.40 11/08/2020    AST 12 11/08/2020    ALT 8 11/08/2020    BILITOT <0.2 11/08/2020     SARS-CoV-2 [place]   Not Detected Not Detected        RADIOLOGY:  Study Result       EXAMINATION: XR CHEST, PA AND LATERAL - 11/08/2020     INDICATION: Pre-op examination.      COMPARISON: None.     FINDINGS: PA and lateral chest reveals the cardiac and mediastinal  silhouettes within normal " limits. The lung fields are grossly clear. No  focal parenchymal opacification is present. No pleural effusion or  pneumothorax. The bony structures are unremarkable. The pulmonary  vascularity is within normal limits.     IMPRESSION:  No acute cardiopulmonary disease.       I reviewed the patient's new clinical results.    Cancer Staging (if applicable)  Cancer Patient: __ yes __no __unknown; If yes, clinical stage T:__ N:__M:__, stage group or __N/A      Impression: Left elbow pain      Plan: LEFT ELBOW TENNIS ELBOW DEBRIDEMENT WITH ANCHOR REPAIR      LUIZA Daniel   11/10/2020   13:01 EST

## 2020-11-10 NOTE — DISCHARGE INSTRUCTIONS
1) wrist brace at all times except for bathing  2) no repetitious gripping, lifting  3) no lifting >1#  4) Aquacel bandage to remain in place until followup  5) you may shower starting POD #3 (Thursday afternoon) with aquacel bandage in place; do not soak in a bathtub or pool

## 2020-12-04 DIAGNOSIS — G43.719 INTRACTABLE CHRONIC MIGRAINE WITHOUT AURA AND WITHOUT STATUS MIGRAINOSUS: ICD-10-CM

## 2020-12-04 RX ORDER — SUMATRIPTAN 6 MG/.5ML
6 INJECTION, SOLUTION SUBCUTANEOUS ONCE AS NEEDED
Qty: 0.5 ML | Refills: 5 | Status: SHIPPED | OUTPATIENT
Start: 2020-12-04 | End: 2021-11-11

## 2020-12-04 RX ORDER — GABAPENTIN 600 MG/1
1200 TABLET ORAL 2 TIMES DAILY
Qty: 360 TABLET | Refills: 3 | Status: SHIPPED | OUTPATIENT
Start: 2020-12-04 | End: 2021-04-16 | Stop reason: SDUPTHER

## 2020-12-04 RX ORDER — DIVALPROEX SODIUM 500 MG/1
500 TABLET, DELAYED RELEASE ORAL 2 TIMES DAILY
Qty: 60 TABLET | Refills: 2 | Status: SHIPPED | OUTPATIENT
Start: 2020-12-04 | End: 2021-03-05

## 2020-12-04 RX ORDER — GALCANEZUMAB 120 MG/ML
120 INJECTION, SOLUTION SUBCUTANEOUS
Qty: 1 EACH | Refills: 11 | Status: SHIPPED | OUTPATIENT
Start: 2020-12-04 | End: 2022-01-10

## 2020-12-04 RX ORDER — TOPIRAMATE 200 MG/1
1 CAPSULE, EXTENDED RELEASE ORAL DAILY
Qty: 90 CAPSULE | Refills: 2 | Status: SHIPPED | OUTPATIENT
Start: 2020-12-04 | End: 2021-07-16 | Stop reason: SDUPTHER

## 2020-12-04 RX ORDER — SUMATRIPTAN 100 MG/1
TABLET, FILM COATED ORAL
Qty: 10 TABLET | Refills: 3 | Status: SHIPPED | OUTPATIENT
Start: 2020-12-04 | End: 2021-07-16 | Stop reason: SDUPTHER

## 2020-12-24 DIAGNOSIS — G43.719 INTRACTABLE CHRONIC MIGRAINE WITHOUT AURA AND WITHOUT STATUS MIGRAINOSUS: Primary | ICD-10-CM

## 2020-12-28 RX ORDER — UBROGEPANT 50 MG/1
TABLET ORAL
Qty: 10 TABLET | Refills: 2 | Status: SHIPPED | OUTPATIENT
Start: 2020-12-28 | End: 2021-04-27

## 2021-01-15 ENCOUNTER — PROCEDURE VISIT (OUTPATIENT)
Dept: NEUROLOGY | Facility: CLINIC | Age: 46
End: 2021-01-15

## 2021-01-15 VITALS
HEIGHT: 60 IN | HEART RATE: 73 BPM | TEMPERATURE: 97.1 F | BODY MASS INDEX: 26.95 KG/M2 | OXYGEN SATURATION: 99 % | SYSTOLIC BLOOD PRESSURE: 112 MMHG | DIASTOLIC BLOOD PRESSURE: 70 MMHG

## 2021-01-15 DIAGNOSIS — G43.719 INTRACTABLE CHRONIC MIGRAINE WITHOUT AURA AND WITHOUT STATUS MIGRAINOSUS: Primary | ICD-10-CM

## 2021-01-15 PROCEDURE — 64615 CHEMODENERV MUSC MIGRAINE: CPT | Performed by: PSYCHIATRY & NEUROLOGY

## 2021-01-15 RX ORDER — ESCITALOPRAM OXALATE 10 MG/1
TABLET ORAL
COMMUNITY
Start: 2021-01-04 | End: 2021-07-16

## 2021-01-15 NOTE — PROGRESS NOTES
CC:  Migraines.    HPI : Patient is in clinic for 3rd Botox injection.  Current headache frequency is approximately 6-8  headache days in a month with some headaches lasting for more than 4 hours.      Botox Procedure Note    Current headache frequency: 6-8__ headaches days / month.    The risks and benefits were discussed with the patient. The patient was given the opportunity to ask questions. Informed consent form was signed.    Onabotulinumtoxin A was reconstituted with 0.9% normal saline. All injections sites were prepped with alcohol swab. Approximately 5 units of botox were injected into each of the following sites bilaterally as per routine migraine botox injection PREEMPT protocol: , procerus, frontalis, temporalis, occipitalis, upper cervical paraspinals, and trapezius.    The patient tolerated the procedure well. There were no immediate complications. The patient will follow up in approximately 12 weeks for her next injection.    Total amount of onabotulinumtoxin A injected was 155 units with 45 units wasted.    Additional notes: She has responded much better after second injection as compared to the first 1. Current migraine frequency is approximately 6-8 migraine days in a month. I have reassured her that with her injection, the migraine intensity and frequency should improve on further. Continue with Emgality monthly injection. Continue with Ubrelvy alternating with sumatriptan as an abortive treatment and I will see her back in 12 weeks for next Botox.

## 2021-03-05 RX ORDER — DIVALPROEX SODIUM 500 MG/1
TABLET, DELAYED RELEASE ORAL
Qty: 60 TABLET | Refills: 1 | Status: SHIPPED | OUTPATIENT
Start: 2021-03-05 | End: 2021-05-19

## 2021-04-16 ENCOUNTER — PROCEDURE VISIT (OUTPATIENT)
Dept: NEUROLOGY | Facility: CLINIC | Age: 46
End: 2021-04-16

## 2021-04-16 VITALS
SYSTOLIC BLOOD PRESSURE: 108 MMHG | BODY MASS INDEX: 26.5 KG/M2 | HEIGHT: 60 IN | OXYGEN SATURATION: 98 % | DIASTOLIC BLOOD PRESSURE: 62 MMHG | WEIGHT: 135 LBS | HEART RATE: 76 BPM | TEMPERATURE: 97.1 F

## 2021-04-16 DIAGNOSIS — G43.719 INTRACTABLE CHRONIC MIGRAINE WITHOUT AURA AND WITHOUT STATUS MIGRAINOSUS: Primary | ICD-10-CM

## 2021-04-16 PROCEDURE — 64615 CHEMODENERV MUSC MIGRAINE: CPT | Performed by: PSYCHIATRY & NEUROLOGY

## 2021-04-16 RX ORDER — GABAPENTIN 600 MG/1
TABLET ORAL
Qty: 150 TABLET | Refills: 3 | Status: SHIPPED | OUTPATIENT
Start: 2021-04-16 | End: 2021-07-16 | Stop reason: SDUPTHER

## 2021-04-16 RX ORDER — VENLAFAXINE HYDROCHLORIDE 75 MG/1
CAPSULE, EXTENDED RELEASE ORAL
COMMUNITY
Start: 2021-04-13 | End: 2022-04-25 | Stop reason: SDUPTHER

## 2021-04-16 RX ORDER — LIFITEGRAST 50 MG/ML
SOLUTION/ DROPS OPHTHALMIC
COMMUNITY
Start: 2021-03-07 | End: 2021-07-16

## 2021-04-16 RX ORDER — CEFUROXIME AXETIL 250 MG/1
TABLET ORAL
COMMUNITY
Start: 2021-04-12 | End: 2021-07-16

## 2021-04-16 RX ORDER — CYCLOBENZAPRINE HCL 10 MG
TABLET ORAL
COMMUNITY
Start: 2021-04-13 | End: 2022-04-08

## 2021-04-16 NOTE — PROGRESS NOTES
CC:  Migraines.    HPI : Patient is in clinic for fourth Botox injection.  Current headache frequency is approximately 6-8-  headache days in a month with some headaches lasting for more than 4 hours.      Botox Procedure Note    Current headache frequency: _6-8_ headaches days / month.    The risks and benefits were discussed with the patient. The patient was given the opportunity to ask questions. Informed consent form was signed.    Onabotulinumtoxin A was reconstituted with 0.9% normal saline. All injections sites were prepped with alcohol swab. Approximately 5 units of botox were injected into each of the following sites bilaterally as per routine migraine botox injection PREEMPT protocol: , procerus, frontalis, temporalis, occipitalis, upper cervical paraspinals, and trapezius.    The patient tolerated the procedure well. There were no immediate complications. The patient will follow up in approximately 12 weeks for her next injection.    Total amount of onabotulinumtoxin A injected was 155 units with 45 units wasted.    Additional notes: She reports that last 1week she had migraine almost every day.  It is likely combination of wearing off of Botox and weather changes last week.  Since she has been on gabapentin 9 mg twice daily for a while now, I will introduce afternoon dose of gabapentin-600 mg and continue with 1200 mg in the morning and 1200 mg at night.  Continue TROKENDI  mg daily and Depakote 100 mg twice daily as well and I will see her back in 12 weeks for follow-up.

## 2021-04-27 DIAGNOSIS — G43.719 INTRACTABLE CHRONIC MIGRAINE WITHOUT AURA AND WITHOUT STATUS MIGRAINOSUS: ICD-10-CM

## 2021-04-27 RX ORDER — UBROGEPANT 50 MG/1
TABLET ORAL
Qty: 10 TABLET | Refills: 1 | Status: SHIPPED | OUTPATIENT
Start: 2021-04-27 | End: 2021-06-07

## 2021-05-19 RX ORDER — DIVALPROEX SODIUM 500 MG/1
TABLET, DELAYED RELEASE ORAL
Qty: 60 TABLET | Refills: 3 | Status: SHIPPED | OUTPATIENT
Start: 2021-05-19 | End: 2021-07-16

## 2021-06-07 DIAGNOSIS — G43.719 INTRACTABLE CHRONIC MIGRAINE WITHOUT AURA AND WITHOUT STATUS MIGRAINOSUS: ICD-10-CM

## 2021-06-07 RX ORDER — UBROGEPANT 50 MG/1
TABLET ORAL
Qty: 10 TABLET | Refills: 0 | Status: SHIPPED | OUTPATIENT
Start: 2021-06-07 | End: 2021-07-16 | Stop reason: SDUPTHER
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
## 2021-07-16 ENCOUNTER — PROCEDURE VISIT (OUTPATIENT)
Dept: NEUROLOGY | Facility: CLINIC | Age: 46
End: 2021-07-16

## 2021-07-16 VITALS — HEART RATE: 110 BPM | OXYGEN SATURATION: 97 % | WEIGHT: 138 LBS | BODY MASS INDEX: 27.09 KG/M2 | HEIGHT: 60 IN

## 2021-07-16 DIAGNOSIS — G43.719 INTRACTABLE CHRONIC MIGRAINE WITHOUT AURA AND WITHOUT STATUS MIGRAINOSUS: Primary | ICD-10-CM

## 2021-07-16 PROCEDURE — 64615 CHEMODENERV MUSC MIGRAINE: CPT | Performed by: PSYCHIATRY & NEUROLOGY

## 2021-07-16 RX ORDER — TOPIRAMATE 200 MG/1
1 CAPSULE, EXTENDED RELEASE ORAL DAILY
Qty: 90 CAPSULE | Refills: 2 | Status: SHIPPED | OUTPATIENT
Start: 2021-07-16 | End: 2022-03-14 | Stop reason: SDUPTHER

## 2021-07-16 RX ORDER — SUMATRIPTAN 100 MG/1
TABLET, FILM COATED ORAL
Qty: 10 TABLET | Refills: 3 | Status: SHIPPED | OUTPATIENT
Start: 2021-07-16 | End: 2022-03-14 | Stop reason: SDUPTHER

## 2021-07-16 RX ORDER — DIVALPROEX SODIUM 500 MG/1
500 TABLET, DELAYED RELEASE ORAL 2 TIMES DAILY
Qty: 60 TABLET | Refills: 3 | Status: SHIPPED | OUTPATIENT
Start: 2021-07-16 | End: 2022-03-14 | Stop reason: SDUPTHER

## 2021-07-16 RX ORDER — NARATRIPTAN 2.5 MG/1
2.5 TABLET ORAL AS NEEDED
Qty: 10 TABLET | Refills: 3 | Status: SHIPPED | OUTPATIENT
Start: 2021-07-16 | End: 2022-09-13

## 2021-07-16 RX ORDER — GABAPENTIN 600 MG/1
TABLET ORAL
Qty: 150 TABLET | Refills: 3 | Status: SHIPPED | OUTPATIENT
Start: 2021-07-16 | End: 2022-03-14 | Stop reason: SDUPTHER

## 2021-07-16 NOTE — PROGRESS NOTES
CC:  Migraines.    HPI : Patient is in clinic for Botox injection.  Current headache frequency is approximately 6-8 headache days in a month with some headaches lasting for more than 4 hours.      Botox Procedure Note    Current headache frequency: _6-8_ headaches days / month.    The risks and benefits were discussed with the patient. The patient was given the opportunity to ask questions. Informed consent form was signed.    Onabotulinumtoxin A was reconstituted with 0.9% normal saline. All injections sites were prepped with alcohol swab. Approximately 5 units of botox were injected into each of the following sites bilaterally as per routine migraine botox injection PREEMPT protocol: , procerus, frontalis, temporalis, occipitalis, upper cervical paraspinals, and trapezius.    The patient tolerated the procedure well. There were no immediate complications. The patient will follow up in approximately 12 weeks for her next injection.    Total amount of onabotulinumtoxin A injected was 155 units with 45 units wasted.    Additional notes: Overall, headache frequency remains under control with cooperative stable Botox, Emgality, gabapentin and Depakote..  I will be giving her a trial of naratriptan to be taken as needed at the onset of severe migraine because sometimes Imitrex does not work well.  Ubrelvy does work for mild to moderate intensity migraine.  I plan to see her back in 12 weeks for follow-up.

## 2021-10-20 ENCOUNTER — PROCEDURE VISIT (OUTPATIENT)
Dept: NEUROLOGY | Facility: CLINIC | Age: 46
End: 2021-10-20

## 2021-10-20 VITALS — HEIGHT: 60 IN | OXYGEN SATURATION: 93 % | WEIGHT: 140 LBS | HEART RATE: 101 BPM | BODY MASS INDEX: 27.48 KG/M2

## 2021-10-20 DIAGNOSIS — G43.719 INTRACTABLE CHRONIC MIGRAINE WITHOUT AURA AND WITHOUT STATUS MIGRAINOSUS: Primary | ICD-10-CM

## 2021-10-20 PROCEDURE — 64615 CHEMODENERV MUSC MIGRAINE: CPT | Performed by: PSYCHIATRY & NEUROLOGY

## 2021-10-20 RX ORDER — HYDROXYZINE PAMOATE 50 MG/1
1 CAPSULE ORAL EVERY 8 HOURS SCHEDULED
COMMUNITY
Start: 2021-10-12 | End: 2022-10-17

## 2021-10-20 NOTE — PROGRESS NOTES
CC:  Migraines.    HPI : Patient is in clinic for Botox injection.  Current headache frequency is approximately 6-8 headache days in a month with some headaches lasting for more than 4 hours.      Botox Procedure Note    Current headache frequency: _6-8_ headaches days / month.    The risks and benefits were discussed with the patient. The patient was given the opportunity to ask questions. Informed consent form was signed.    Onabotulinumtoxin A was reconstituted with 0.9% normal saline. All injections sites were prepped with alcohol swab. Approximately 5 units of botox were injected into each of the following sites bilaterally as per routine migraine botox injection PREEMPT protocol: , procerus, frontalis, temporalis, occipitalis, upper cervical paraspinals, and trapezius.    The patient tolerated the procedure well. There were no immediate complications. The patient will follow up in approximately 12 weeks for her next injection.    Total amount of onabotulinumtoxin A injected was 155 units with 45 units wasted.

## 2021-11-04 ENCOUNTER — TELEPHONE (OUTPATIENT)
Dept: NEUROLOGY | Facility: CLINIC | Age: 46
End: 2021-11-04

## 2021-11-04 NOTE — TELEPHONE ENCOUNTER
"Caller: Molly Ferguson    Relationship: Self      Medication requested (name and dosage): UBROGEPANT 50 MG    Requested Prescriptions:   Requested Prescriptions      No prescriptions requested or ordered in this encounter        Pharmacy where request should be sent: [facility]    Additional details provided by patient: PT STATES SHE IS OUT OF HER MEDICATION AND [facility] SAID SHE NEEDS A NEW RX.  SHOW THAT RX HAS BEEN DISCONTINUED IN CHART, BUT [facility] STATES \"REORDER\"    Best call back number: [phone]    Does the patient have less than a 3 day supply:  [x] Yes  [] No    Elder Schroeder Rep   11/04/21 15:36 EDT           "

## 2021-11-11 RX ORDER — CEFUROXIME AXETIL 250 MG/1
TABLET ORAL
Qty: 1 EACH | Refills: 2 | Status: SHIPPED | OUTPATIENT
Start: 2021-11-11 | End: 2022-03-14 | Stop reason: SDUPTHER

## 2022-01-10 RX ORDER — GALCANEZUMAB 120 MG/ML
INJECTION, SOLUTION SUBCUTANEOUS
Qty: 1 ML | Refills: 2 | Status: SHIPPED | OUTPATIENT
Start: 2022-01-10 | End: 2022-03-14 | Stop reason: SDUPTHER

## 2022-01-10 NOTE — TELEPHONE ENCOUNTER
Rx Refill Note  Requested Prescriptions      No prescriptions requested or ordered in this encounter      Last office visit with prescribing clinician: 9/11/2020      Next office visit with prescribing clinician: 1/12/2022            Radha Staples MA  01/10/22, 09:00 EST

## 2022-01-12 ENCOUNTER — PROCEDURE VISIT (OUTPATIENT)
Dept: NEUROLOGY | Facility: CLINIC | Age: 47
End: 2022-01-12

## 2022-01-12 VITALS — HEART RATE: 82 BPM | OXYGEN SATURATION: 98 %

## 2022-01-12 DIAGNOSIS — G43.719 INTRACTABLE CHRONIC MIGRAINE WITHOUT AURA AND WITHOUT STATUS MIGRAINOSUS: Primary | ICD-10-CM

## 2022-01-12 PROCEDURE — 64615 CHEMODENERV MUSC MIGRAINE: CPT | Performed by: PSYCHIATRY & NEUROLOGY

## 2022-01-12 RX ORDER — ATOGEPANT 60 MG/1
60 TABLET ORAL DAILY
Qty: 30 TABLET | Refills: 2 | Status: SHIPPED | OUTPATIENT
Start: 2022-01-12 | End: 2022-02-11

## 2022-01-12 NOTE — PROGRESS NOTES
CC:  Migraines.    HPI : Patient is in clinic for Botox injection.  Current headache frequency is approximately 8-10  headache days in a month with some headaches lasting for more than 4 hours.      Botox Procedure Note    Current headache frequency: 8-10 headaches days / month.    The risks and benefits were discussed with the patient. The patient was given the opportunity to ask questions. Informed consent form was signed.    Onabotulinumtoxin A was reconstituted with 0.9% normal saline. All injections sites were prepped with alcohol swab. Approximately 5 units of botox were injected into each of the following sites bilaterally as per routine migraine botox injection PREEMPT protocol: , procerus, frontalis, temporalis, occipitalis, upper cervical paraspinals, and trapezius.    The patient tolerated the procedure well. There were no immediate complications. The patient will follow up in approximately 12 weeks for her next injection.    Total amount of onabotulinumtoxin A injected was 155 units with 45 units wasted.    Additional notes: She reports that she continues to have 8-10 intractable headaches/migraines.  I am going to start her on Qulipta 60 mg daily.  If she responds to Qulipta then plan is to either taper off of Depakote or Trokendi XR.  Continue with Emgality monthly injection as well and I will plan to see her back in 12 weeks for next Botox injection.

## 2022-03-14 DIAGNOSIS — G43.719 INTRACTABLE CHRONIC MIGRAINE WITHOUT AURA AND WITHOUT STATUS MIGRAINOSUS: ICD-10-CM

## 2022-03-14 RX ORDER — TOPIRAMATE 200 MG/1
1 CAPSULE, EXTENDED RELEASE ORAL DAILY
Qty: 90 CAPSULE | Refills: 2 | Status: SHIPPED | OUTPATIENT
Start: 2022-03-14

## 2022-03-14 RX ORDER — SUMATRIPTAN 100 MG/1
TABLET, FILM COATED ORAL
Qty: 10 TABLET | Refills: 3 | Status: SHIPPED | OUTPATIENT
Start: 2022-03-14 | End: 2022-07-18

## 2022-03-14 RX ORDER — GABAPENTIN 600 MG/1
TABLET ORAL
Qty: 150 TABLET | Refills: 3 | Status: SHIPPED | OUTPATIENT
Start: 2022-03-14 | End: 2022-08-16

## 2022-03-14 RX ORDER — CEFUROXIME AXETIL 250 MG/1
6 TABLET ORAL ONCE AS NEEDED
Qty: 1 EACH | Refills: 3 | Status: SHIPPED | OUTPATIENT
Start: 2022-03-14 | End: 2023-02-03

## 2022-03-14 RX ORDER — GALCANEZUMAB 120 MG/ML
120 INJECTION, SOLUTION SUBCUTANEOUS
Qty: 1 ML | Refills: 2 | Status: SHIPPED | OUTPATIENT
Start: 2022-03-14 | End: 2022-04-13

## 2022-03-14 RX ORDER — DIVALPROEX SODIUM 500 MG/1
500 TABLET, DELAYED RELEASE ORAL 2 TIMES DAILY
Qty: 60 TABLET | Refills: 3 | Status: SHIPPED | OUTPATIENT
Start: 2022-03-14 | End: 2022-08-01

## 2022-04-06 ENCOUNTER — TELEPHONE (OUTPATIENT)
Dept: NEUROLOGY | Facility: CLINIC | Age: 47
End: 2022-04-06

## 2022-04-06 RX ORDER — ATOGEPANT 60 MG/1
60 TABLET ORAL DAILY
Qty: 30 TABLET | Refills: 6 | Status: SHIPPED | OUTPATIENT
Start: 2022-04-06 | End: 2022-05-06

## 2022-04-06 NOTE — TELEPHONE ENCOUNTER
Caller: [address] - [phone] [facility] [phone] FX    Relationship: Pharmacy    Best call back number: [phone]    Requested Prescriptions:  Atogepant (Qulipta) 60 MG tablet [[id number]]- UNABLE TO ATTACH VIA SMARTLINK AS MEDICATION IS NOT LISTED AS ACTIVE.  Requested Prescriptions      No prescriptions requested or ordered in this encounter      Pharmacy where request should be sent: [place] [id number] - [phone] [place] [phone] FX     Additional details provided by patient: PHARMACY STATES THEY HAVE ALSO FAXED SEVERAL REQUESTS.    Does the patient have less than a 3 day supply:  [x] Yes  [] No    Last refilled: 1/12/22 2 REFILL(S)  Last appointment: 6/5/20  Next appointment: NO F/U APPT    PLEASE REVIEW AND ADVISE.    Elder oLco Rep   04/06/22 08:47 EDT

## 2022-04-07 ENCOUNTER — TELEPHONE (OUTPATIENT)
Dept: NEUROLOGY | Facility: CLINIC | Age: 47
End: 2022-04-07

## 2022-04-07 NOTE — TELEPHONE ENCOUNTER
Caller: Molly Ferguson    Relationship: Self    Best call back number: [phone]    Who are you requesting to speak with (clinical staff, provider,  specific staff member):   DR BARKLEY    What was the call regarding:   BOTOX APPT SCHEDULED FOR TODAY 4-7-22.  PT HAD PROCEDURE DONE RECENTLY AND IS ON NARCOTICS AND CAN'T DRIVE AND CANNOT GET A RIDE.    Do you require a callback: PLEASE CALL PT BACK TO RESCHEDULE.

## 2022-04-08 RX ORDER — CYCLOBENZAPRINE HCL 10 MG
TABLET ORAL
Qty: 90 TABLET | Refills: 1 | Status: SHIPPED | OUTPATIENT
Start: 2022-04-08 | End: 2022-06-21

## 2022-04-25 RX ORDER — VENLAFAXINE HYDROCHLORIDE 75 MG/1
150 CAPSULE, EXTENDED RELEASE ORAL DAILY
Qty: 30 CAPSULE | Refills: 5 | Status: SHIPPED | OUTPATIENT
Start: 2022-04-25 | End: 2022-12-09 | Stop reason: DRUGHIGH

## 2022-05-04 NOTE — TELEPHONE ENCOUNTER
Rx Refill Note  Requested Prescriptions     Pending Prescriptions Disp Refills   • ubrogepant (UBRELVY) 50 MG tablet 10 tablet 3     Sig: Take 2 tablets by mouth As Needed (As needed) for up to 1 dose.      Last office visit with prescribing clinician: 9/11/2020      Next office visit with prescribing clinician: 5/27/2022            Abdon De Souza MA  05/04/22, 11:45 EDT

## 2022-05-04 NOTE — TELEPHONE ENCOUNTER
Caller: [address] - [phone] [facility] [phone] FX    Relationship: Pharmacy    Best call back number: [phone]    Requested Prescriptions:   Requested Prescriptions     Pending Prescriptions Disp Refills   • ubrogepant (UBRELVY) 50 MG tablet 10 tablet 3     Sig: Take 2 tablets by mouth As Needed (As needed) for up to 1 dose.        Pharmacy where request should be sent: [address] - [phone] [facility] [phone] FX     Additional details provided by patient: PHARMACY CALLED TO REQUEST THE REFILL. PLEASE ADVISE.     Does the patient have less than a 3 day supply:  [] Yes  [] No    Elder [place] Rep   05/04/22 11:29 EDT

## 2022-05-27 ENCOUNTER — PROCEDURE VISIT (OUTPATIENT)
Dept: NEUROLOGY | Facility: CLINIC | Age: 47
End: 2022-05-27

## 2022-05-27 VITALS
DIASTOLIC BLOOD PRESSURE: 84 MMHG | HEART RATE: 58 BPM | RESPIRATION RATE: 14 BRPM | HEIGHT: 60 IN | SYSTOLIC BLOOD PRESSURE: 110 MMHG | BODY MASS INDEX: 27.48 KG/M2 | OXYGEN SATURATION: 99 % | WEIGHT: 140 LBS

## 2022-05-27 DIAGNOSIS — G43.719 INTRACTABLE CHRONIC MIGRAINE WITHOUT AURA AND WITHOUT STATUS MIGRAINOSUS: Primary | ICD-10-CM

## 2022-05-27 PROCEDURE — 64615 CHEMODENERV MUSC MIGRAINE: CPT | Performed by: PSYCHIATRY & NEUROLOGY

## 2022-05-27 NOTE — PROGRESS NOTES
CC:  Migraines.    HPI : Patient is in clinic for Botox injection.  Current headache frequency is approximately 6-8 headache days in a month with some headaches lasting for more than 4 hours.      Botox Procedure Note    Current headache frequency: 6-8 headaches days / month.    The risks and benefits were discussed with the patient. The patient was given the opportunity to ask questions. Informed consent form was signed.    Onabotulinumtoxin A was reconstituted with 0.9% normal saline. All injections sites were prepped with alcohol swab. Approximately 5 units of botox were injected into each of the following sites bilaterally as per routine migraine botox injection PREEMPT protocol: , procerus, frontalis, temporalis, occipitalis, upper cervical paraspinals, and trapezius.    The patient tolerated the procedure well. There were no immediate complications. The patient will follow up in approximately 12 weeks for her next injection.    Total amount of onabotulinumtoxin A injected was 155 units with 45 units wasted.    Note: On the last visit, Qulipta 60 mg was prescribed which she has been taking now.  She does report reduced frequency and intensity of migraines since starting Qulipta.  I will be tapering her off of Depakote in next 2 weeks so she will be taking Trokendi  mg daily, Qulipta 60 mg daily for migraine prevention.  I will see her back in 12 weeks for next injection.

## 2022-06-21 RX ORDER — CYCLOBENZAPRINE HCL 10 MG
TABLET ORAL
Qty: 90 TABLET | Refills: 1 | Status: SHIPPED | OUTPATIENT
Start: 2022-06-21 | End: 2022-08-01 | Stop reason: SDUPTHER

## 2022-06-24 RX ORDER — METHYLPREDNISOLONE 4 MG/1
TABLET ORAL
Qty: 1 EACH | Refills: 0 | Status: SHIPPED | OUTPATIENT
Start: 2022-06-24 | End: 2022-08-01

## 2022-07-15 DIAGNOSIS — G43.719 INTRACTABLE CHRONIC MIGRAINE WITHOUT AURA AND WITHOUT STATUS MIGRAINOSUS: ICD-10-CM

## 2022-07-18 RX ORDER — TRAZODONE HYDROCHLORIDE 50 MG/1
TABLET ORAL
Qty: 180 TABLET | Refills: 0 | Status: SHIPPED | OUTPATIENT
Start: 2022-07-18 | End: 2022-09-13 | Stop reason: SDUPTHER

## 2022-07-18 RX ORDER — SUMATRIPTAN 100 MG/1
TABLET, FILM COATED ORAL
Qty: 6 TABLET | Refills: 1 | Status: SHIPPED | OUTPATIENT
Start: 2022-07-18 | End: 2022-09-22

## 2022-08-01 ENCOUNTER — TELEMEDICINE (OUTPATIENT)
Dept: FAMILY MEDICINE CLINIC | Facility: CLINIC | Age: 47
End: 2022-08-01

## 2022-08-01 VITALS — HEIGHT: 60 IN | WEIGHT: 139 LBS | BODY MASS INDEX: 27.29 KG/M2

## 2022-08-01 DIAGNOSIS — F39 MOOD DISORDER: Primary | ICD-10-CM

## 2022-08-01 PROBLEM — M19.90 ARTHRITIS: Status: ACTIVE | Noted: 2022-08-01

## 2022-08-01 PROBLEM — F32.A DEPRESSIVE DISORDER: Status: ACTIVE | Noted: 2022-08-01

## 2022-08-01 PROBLEM — H60.91 OTITIS EXTERNA OF RIGHT EAR: Status: ACTIVE | Noted: 2022-08-01

## 2022-08-01 PROBLEM — F41.9 ANXIETY: Status: ACTIVE | Noted: 2022-08-01

## 2022-08-01 PROBLEM — G43.909 MIGRAINE: Status: ACTIVE | Noted: 2022-08-01

## 2022-08-01 PROBLEM — F32.4 MAJOR DEPRESSION IN PARTIAL REMISSION: Status: ACTIVE | Noted: 2022-08-01

## 2022-08-01 PROBLEM — K58.9 IRRITABLE BOWEL SYNDROME: Status: ACTIVE | Noted: 2022-08-01

## 2022-08-01 PROCEDURE — 99213 OFFICE O/P EST LOW 20 MIN: CPT | Performed by: PHYSICIAN ASSISTANT

## 2022-08-01 RX ORDER — OXYCODONE HYDROCHLORIDE AND ACETAMINOPHEN 5; 325 MG/1; MG/1
TABLET ORAL
COMMUNITY
Start: 2022-07-14 | End: 2022-08-01

## 2022-08-01 RX ORDER — CYCLOBENZAPRINE HCL 10 MG
10 TABLET ORAL EVERY 8 HOURS PRN
Qty: 90 TABLET | Refills: 1 | Status: SHIPPED | OUTPATIENT
Start: 2022-08-01 | End: 2022-10-28

## 2022-08-01 RX ORDER — ARIPIPRAZOLE 5 MG/1
5 TABLET ORAL DAILY
Qty: 30 TABLET | Refills: 1 | Status: SHIPPED | OUTPATIENT
Start: 2022-08-01 | End: 2022-10-07

## 2022-08-01 RX ORDER — GALCANEZUMAB 120 MG/ML
INJECTION, SOLUTION SUBCUTANEOUS
COMMUNITY
Start: 2022-07-18 | End: 2022-08-30 | Stop reason: SDUPTHER

## 2022-08-01 RX ORDER — DIAZEPAM 5 MG/1
TABLET ORAL
COMMUNITY
Start: 2022-07-16 | End: 2022-08-01

## 2022-08-01 RX ORDER — ATOGEPANT 60 MG/1
TABLET ORAL
COMMUNITY

## 2022-08-01 NOTE — PROGRESS NOTES
"Chief Complaint  Depression and Anxiety (Patient wants to discuss her meds.)    Subjective  Patient was seen today through synchronous audio/video technology using [employer] technololgy. Verbal consent was obtained. The patient was located at home. Vitals signs were not obtained due to lack of home monitoring access. Time spent with patient was 20 minutes.      Molly Ferguson presents to [facility]  History of Present Illness patient states that she is having all of anxiety, irritability and crying all the time just cannot seem to get a hold of her emotions and they are \"all over the place.\"  He does note that he had a surgical procedure and during that time they gave her Valium and it seemed to calm her and she was feeling better during that time.  She is wondering if there is something similar to Valium that she could take or if she could get a prescription for Valium to take on a regular basis.    Objective   Vital Signs:   Ht 151.1 cm (59.5\")   Wt 63 kg (139 lb) Comment: Patient reported vitals.  BMI 27.60 kg/m²     Body mass index is 27.6 kg/m².    Review of Systems   Constitutional: Negative for chills, fatigue and fever.   Respiratory: Negative for cough, shortness of breath and wheezing.    Cardiovascular: Negative for chest pain and palpitations.   Neurological: Negative.    Psychiatric/Behavioral: Positive for dysphoric mood and stress. Negative for suicidal ideas. The patient is nervous/anxious.        Past History:  Medical History: has a past medical history of Depression, Depressive disorder, History of anxiety, History of hyperlipidemia, Migraines, Patellar malalignment syndrome of left knee, PONV (postoperative nausea and vomiting), Tennis elbow, Trigger thumb, and Visual disturbances.   Surgical History: has a past surgical history that includes Tubal ligation; Hysterectomy; Shoulder surgery (Left, 2020);  section; Colonoscopy; " Esophagogastroduodenoscopy; Tennis Elbow Release (Left, 11/10/2020); and Essure tubal ligation.   Family History: family history includes Alzheimer's disease in some other family members; Breast cancer in her maternal grandmother; Cancer in an other family member; Diabetes in her father; Heart disease in her father and another family member; Hyperlipidemia in her father; Hypertension in her father and maternal grandmother; Parkinsonism in an other family member; Skin cancer in her mother.   Social History: reports that she has been smoking cigarettes. She has a 1.25 pack-year smoking history. She has never used smokeless tobacco. She reports current alcohol use. She reports that she does not use drugs.      Current Outpatient Medications:   •  Atogepant (Qulipta) 60 MG tablet, Take  by mouth., Disp: , Rfl:   •  buPROPion XL (WELLBUTRIN XL) 300 MG 24 hr tablet, Take 300 mg by mouth Every Morning., Disp: , Rfl:   •  cyclobenzaprine (FLEXERIL) 10 MG tablet, Take 1 tablet by mouth Every 8 (Eight) Hours As Needed for Muscle Spasms., Disp: 90 tablet, Rfl: 1  •  Dietary Management Product (VASCULERA PO), Take  by mouth., Disp: , Rfl:   •  Emgality 120 MG/ML auto-injector pen, , Disp: , Rfl:   •  gabapentin (NEURONTIN) 600 MG tablet, Take 2 tablets in the morning, 1 tablet in the afternoon and 2 tablets at night., Disp: 150 tablet, Rfl: 3  •  hydrOXYzine pamoate (VISTARIL) 50 MG capsule, Take 1 capsule by mouth Every 8 (Eight) Hours., Disp: , Rfl:   •  OnabotulinumtoxinA 200 units reconstituted solution, Inject 200 units IM into head neck shoulders every 12 weeks per FDA protocol Dx G43.719 SHIP TO SANDRITA HOLDEN., Disp: 1 each, Rfl: 3  •  SUMAtriptan (IMITREX) 100 MG tablet, TAKE ONE TABLET BY MOUTH AT ONSET OF HEADACHE; MAY REPEAT ONE TABLET IN 2 HOURS IF NEEDED. MAX OF 200MG A DAY, Disp: 6 tablet, Rfl: 1  •  SUMAtriptan Succinate (IMITREX) 6 MG/0.5ML injection, Inject prescribed dose at onset of headache. May repeat  dose one time in 1 hour(s) if headache not relieved., Disp: 1 each, Rfl: 3  •  Topiramate ER (Trokendi XR) 200 MG capsule sustained-release 24 hr, Take 1 capsule by mouth Daily., Disp: 90 capsule, Rfl: 2  •  traZODone (DESYREL) 50 MG tablet, TAKE TWO TABLETS BY MOUTH EVERY NIGHT AT BEDTIME FOR SLEEP AND ANXIETY, Disp: 180 tablet, Rfl: 0  •  ubrogepant (UBRELVY) 50 MG tablet, Take 2 tablets by mouth As Needed (As needed) for up to 1 dose., Disp: 10 tablet, Rfl: 3  •  venlafaxine XR (EFFEXOR-XR) 75 MG 24 hr capsule, Take 2 capsules by mouth Daily., Disp: 30 capsule, Rfl: 5  •  ARIPiprazole (Abilify) 5 MG tablet, Take 1 tablet by mouth Daily., Disp: 30 tablet, Rfl: 1  •  naratriptan (AMERGE) 2.5 MG tablet, Take 1 tablet by mouth As Needed for Migraine for up to 29 days., Disp: 10 tablet, Rfl: 3    Allergies: Codeine, Nickel, Penicillins, and Penicillin g    Physical Exam  Constitutional:       Appearance: Normal appearance.   Neurological:      Mental Status: She is alert and oriented to person, place, and time.   Psychiatric:         Mood and Affect: Mood normal.         Behavior: Behavior normal.          Result Review :                   Assessment and Plan    Diagnoses and all orders for this visit:    1. Mood disorder (HCC) (Primary)  Assessment & Plan:  I explained to patient that Valium was not preferred method of treatment for this issue.  We are going to try adding Abilify to her current medications and I will see her back in a month to see if she is improved.      Other orders  -     ARIPiprazole (Abilify) 5 MG tablet; Take 1 tablet by mouth Daily.  Dispense: 30 tablet; Refill: 1  -     cyclobenzaprine (FLEXERIL) 10 MG tablet; Take 1 tablet by mouth Every 8 (Eight) Hours As Needed for Muscle Spasms.  Dispense: 90 tablet; Refill: 1      Follow Up   No follow-ups on file.  Patient was given instructions and counseling regarding her condition or for health maintenance advice. Please see specific information  pulled into the AVS if appropriate.     Andreia Vargas, ABDIAZIZ

## 2022-08-01 NOTE — ASSESSMENT & PLAN NOTE
I explained to patient that Valium was not preferred method of treatment for this issue.  We are going to try adding Abilify to her current medications and I will see her back in a month to see if she is improved.

## 2022-08-15 DIAGNOSIS — G43.719 INTRACTABLE CHRONIC MIGRAINE WITHOUT AURA AND WITHOUT STATUS MIGRAINOSUS: ICD-10-CM

## 2022-08-16 RX ORDER — GABAPENTIN 600 MG/1
TABLET ORAL
Qty: 150 TABLET | Refills: 3 | Status: SHIPPED | OUTPATIENT
Start: 2022-08-16 | End: 2023-01-16

## 2022-08-30 ENCOUNTER — TELEPHONE (OUTPATIENT)
Dept: NEUROLOGY | Facility: CLINIC | Age: 47
End: 2022-08-30

## 2022-08-30 RX ORDER — BUPROPION HYDROCHLORIDE 300 MG/1
TABLET ORAL
Qty: 90 TABLET | Refills: 1 | Status: SHIPPED | OUTPATIENT
Start: 2022-08-30 | End: 2023-02-14

## 2022-08-30 RX ORDER — GALCANEZUMAB 120 MG/ML
120 INJECTION, SOLUTION SUBCUTANEOUS ONCE
Qty: 1.12 ML | Refills: 3 | Status: SHIPPED | OUTPATIENT
Start: 2022-08-30 | End: 2022-08-30

## 2022-08-30 NOTE — TELEPHONE ENCOUNTER
Caller: Molly Ferguson    Relationship: Self    Best call back number: [phone]    Requested Prescriptions:   Requested Prescriptions     Pending Prescriptions Disp Refills   • Emgality 120 MG/ML auto-injector pen 1.12 mL         Pharmacy where request should be sent: [facility] [phone]        Does the patient have less than a 3 day supply:  [x] Yes  [] No    Elder Keys Rep   08/30/22 11:44 EDT

## 2022-09-13 ENCOUNTER — TELEMEDICINE (OUTPATIENT)
Dept: FAMILY MEDICINE CLINIC | Facility: CLINIC | Age: 47
End: 2022-09-13

## 2022-09-13 ENCOUNTER — CLINICAL SUPPORT (OUTPATIENT)
Dept: FAMILY MEDICINE CLINIC | Facility: CLINIC | Age: 47
End: 2022-09-13

## 2022-09-13 VITALS — WEIGHT: 137 LBS | HEIGHT: 60 IN | BODY MASS INDEX: 26.9 KG/M2

## 2022-09-13 DIAGNOSIS — M25.561 ACUTE PAIN OF RIGHT KNEE: Primary | ICD-10-CM

## 2022-09-13 DIAGNOSIS — R10.13 EPIGASTRIC ABDOMINAL PAIN: ICD-10-CM

## 2022-09-13 PROBLEM — M25.519 ARTHRALGIA OF SHOULDER: Status: ACTIVE | Noted: 2019-04-18

## 2022-09-13 PROCEDURE — 99213 OFFICE O/P EST LOW 20 MIN: CPT | Performed by: PHYSICIAN ASSISTANT

## 2022-09-13 PROCEDURE — 96372 THER/PROPH/DIAG INJ SC/IM: CPT | Performed by: PHYSICIAN ASSISTANT

## 2022-09-13 RX ORDER — MELOXICAM 7.5 MG/1
TABLET ORAL
COMMUNITY
Start: 2022-09-05 | End: 2022-09-13

## 2022-09-13 RX ORDER — PREDNISONE 20 MG/1
TABLET ORAL
Qty: 20 TABLET | Refills: 0 | Status: SHIPPED | OUTPATIENT
Start: 2022-09-13 | End: 2022-09-24

## 2022-09-13 RX ORDER — NABUMETONE 750 MG/1
750 TABLET, FILM COATED ORAL 2 TIMES DAILY PRN
Qty: 30 TABLET | Refills: 1 | Status: SHIPPED | OUTPATIENT
Start: 2022-09-13 | End: 2022-12-09

## 2022-09-13 RX ORDER — PANTOPRAZOLE SODIUM 40 MG/1
40 TABLET, DELAYED RELEASE ORAL DAILY
Qty: 30 TABLET | Refills: 3 | Status: SHIPPED | OUTPATIENT
Start: 2022-09-13 | End: 2023-01-24

## 2022-09-13 RX ORDER — KETOROLAC TROMETHAMINE 30 MG/ML
60 INJECTION, SOLUTION INTRAMUSCULAR; INTRAVENOUS ONCE
Status: COMPLETED | OUTPATIENT
Start: 2022-09-13 | End: 2022-09-13

## 2022-09-13 RX ORDER — GALCANEZUMAB 120 MG/ML
INJECTION, SOLUTION SUBCUTANEOUS
COMMUNITY
Start: 2022-08-30 | End: 2023-02-03

## 2022-09-13 RX ADMIN — KETOROLAC TROMETHAMINE 60 MG: 30 INJECTION, SOLUTION INTRAMUSCULAR; INTRAVENOUS at 13:44

## 2022-09-13 NOTE — ASSESSMENT & PLAN NOTE
This is difficult to evaluate via telehealth but I suspect it is related to a gastritis possibly due to NSAIDS and I am suggesting a trial of Pantoprazole, if this persists I would suggest she see GI.  She may also want to schedule an in office visit for us to further evaluate this.

## 2022-09-13 NOTE — PROGRESS NOTES
"Chief Complaint  Knee Pain (Patient complains of Left knee pain x2 wks) and Abdominal Pain (X6 months)    Subjective  Patient presents during the COVID-19  pandemic/federally declared state of public health emergency.  For today's visit this provider is located at [facility]. Patient was seen today through synchronous audio/video technology. Verbal consent was obtained. The patient was located at work. Vitals signs were not obtained due to lack of home monitoring access. Time spent with patient was 15 minutes.      Molly Ferguson presents to [facility]  History of Present Illness she is being seen today complaining of left knee pain apparently she has had evaluation by orthopedics and told she was bone-on-bone and had a gel shot done.  She said that several days after her gel shot she does not know if  she did something to the knee or twisted it or something but it swelled and she had severe pain and it has not responded to ice and rest.  She called orthopedics but her provider  there is out of the office on vacation.  She has been taking meloxicam but it does not seem to be easing her discomfort.  She also mentioned that she has had a 6-month history of epigastric abdominal pain that occurs when she eats she has no associated nausea vomiting diarrhea or constipation. It only occurs after she eats. It is located in the epigastric and left upper quadrant.     Objective   Vital Signs:   Ht 152.4 cm (60\")   Wt 62.1 kg (137 lb)   BMI 26.76 kg/m²     Body mass index is 26.76 kg/m².    Review of Systems   Constitutional: Negative for chills, fatigue and fever.   Respiratory: Negative for cough and shortness of breath.    Cardiovascular: Negative for chest pain and palpitations.   Gastrointestinal: Positive for abdominal pain (eigastric and left side). Negative for nausea and vomiting.   Musculoskeletal: Positive for joint swelling (Right knee pain and swelling). Negative " for back pain and myalgias.   Neurological: Negative for dizziness and headache.   Psychiatric/Behavioral: Negative for depressed mood. The patient is not nervous/anxious.        Past History:  Medical History: has a past medical history of Depression, Depressive disorder, History of anxiety, History of hyperlipidemia, Migraines, Patellar malalignment syndrome of left knee, PONV (postoperative nausea and vomiting), Tennis elbow, Trigger thumb, and Visual disturbances.   Surgical History: has a past surgical history that includes Tubal ligation; Hysterectomy; Shoulder surgery (Left, 2020);  section; Colonoscopy; Esophagogastroduodenoscopy; Tennis Elbow Release (Left, 11/10/2020); and Essure tubal ligation.   Family History: family history includes Alzheimer's disease in some other family members; Breast cancer in her maternal grandmother; Cancer in an other family member; Diabetes in her father; Heart disease in her father and another family member; Hyperlipidemia in her father; Hypertension in her father and maternal grandmother; Parkinsonism in an other family member; Skin cancer in her mother.   Social History: reports that she has been smoking cigarettes. She has a 2.50 pack-year smoking history. She has never used smokeless tobacco. She reports current alcohol use. She reports that she does not use drugs.      Current Outpatient Medications:   •  ARIPiprazole (Abilify) 5 MG tablet, Take 1 tablet by mouth Daily., Disp: 30 tablet, Rfl: 1  •  Atogepant (Qulipta) 60 MG tablet, Take  by mouth., Disp: , Rfl:   •  buPROPion XL (WELLBUTRIN XL) 300 MG 24 hr tablet, TAKE ONE TABLET BY MOUTH DAILY, Disp: 90 tablet, Rfl: 1  •  cyclobenzaprine (FLEXERIL) 10 MG tablet, Take 1 tablet by mouth Every 8 (Eight) Hours As Needed for Muscle Spasms., Disp: 90 tablet, Rfl: 1  •  Dietary Management Product (VASCULERA PO), Take  by mouth., Disp: , Rfl:   •  gabapentin (NEURONTIN) 600 MG tablet, TAKE TWO TABLETS BY MOUTH  EVERY MORNING AND 1 TABLET AT NOON AND 2 TABLETS AT NIGHT., Disp: 150 tablet, Rfl: 3  •  hydrOXYzine pamoate (VISTARIL) 50 MG capsule, Take 1 capsule by mouth Every 8 (Eight) Hours., Disp: , Rfl:   •  OnabotulinumtoxinA 200 units reconstituted solution, Inject 200 units IM into head neck shoulders every 12 weeks per FDA protocol Dx G43.719 SHIP TO ASHLYSJENNIFER HOLDEN., Disp: 1 each, Rfl: 3  •  SUMAtriptan (IMITREX) 100 MG tablet, TAKE ONE TABLET BY MOUTH AT ONSET OF HEADACHE; MAY REPEAT ONE TABLET IN 2 HOURS IF NEEDED. MAX OF 200MG A DAY, Disp: 6 tablet, Rfl: 1  •  SUMAtriptan Succinate (IMITREX) 6 MG/0.5ML injection, Inject prescribed dose at onset of headache. May repeat dose one time in 1 hour(s) if headache not relieved., Disp: 1 each, Rfl: 3  •  Topiramate ER (Trokendi XR) 200 MG capsule sustained-release 24 hr, Take 1 capsule by mouth Daily., Disp: 90 capsule, Rfl: 2  •  ubrogepant (UBRELVY) 50 MG tablet, Take 2 tablets by mouth As Needed (As needed) for up to 1 dose., Disp: 10 tablet, Rfl: 3  •  venlafaxine XR (EFFEXOR-XR) 75 MG 24 hr capsule, Take 2 capsules by mouth Daily., Disp: 30 capsule, Rfl: 5  •  Emgality 120 MG/ML auto-injector pen, , Disp: , Rfl:   •  nabumetone (RELAFEN) 750 MG tablet, Take 1 tablet by mouth 2 (Two) Times a Day As Needed for Moderate Pain., Disp: 30 tablet, Rfl: 1  •  pantoprazole (PROTONIX) 40 MG EC tablet, Take 1 tablet by mouth Daily., Disp: 30 tablet, Rfl: 3  •  predniSONE (DELTASONE) 20 MG tablet, Take 3 tablets by mouth Daily for 3 days, THEN 2 tablets Daily for 3 days, THEN 1 tablet Daily for 5 days., Disp: 20 tablet, Rfl: 0  •  TRAZODONE 100MG/5ML LIQUID, Every 12 (Twelve) Hours., Disp: , Rfl:     Current Facility-Administered Medications:   •  ketorolac (TORADOL) injection 60 mg, 60 mg, Intramuscular, Once, Means, Andreia, PA-C    Allergies: Codeine, Nickel, Penicillins, Morphine and related, and Penicillin g    Physical Exam  Constitutional:       Appearance: Normal  appearance.   Neurological:      Mental Status: She is alert and oriented to person, place, and time.   Psychiatric:         Mood and Affect: Mood normal.         Behavior: Behavior normal.          Result Review :                   Assessment and Plan    Diagnoses and all orders for this visit:    1. Acute pain of right knee (Primary)  Assessment & Plan:  I told her we could give her a toradol shot for pain today, then I suggested changing her Meloxicam to Nabumetone to se if it will work beter and I am giving her a steroid taper to see if that will improve her pain and swelling. She does need to follow up with her ortho doctor.       Orders:  -     ketorolac (TORADOL) injection 60 mg    2. Epigastric abdominal pain  Assessment & Plan:  This is difficult to evaluate via telehealth but I suspect it is related to a gastritis possibly due to NSAIDS and I am suggesting a trial of Pantoprazole, if this persists I would suggest she see GI.  She may also want to schedule an in office visit for us to further evaluate this.       Other orders  -     nabumetone (RELAFEN) 750 MG tablet; Take 1 tablet by mouth 2 (Two) Times a Day As Needed for Moderate Pain.  Dispense: 30 tablet; Refill: 1  -     predniSONE (DELTASONE) 20 MG tablet; Take 3 tablets by mouth Daily for 3 days, THEN 2 tablets Daily for 3 days, THEN 1 tablet Daily for 5 days.  Dispense: 20 tablet; Refill: 0  -     pantoprazole (PROTONIX) 40 MG EC tablet; Take 1 tablet by mouth Daily.  Dispense: 30 tablet; Refill: 3      Follow Up   No follow-ups on file.  Patient was given instructions and counseling regarding her condition or for health maintenance advice. Please see specific information pulled into the AVS if appropriate.     Andreia Vargas PA-C

## 2022-09-13 NOTE — ASSESSMENT & PLAN NOTE
I told her we could give her a toradol shot for pain today, then I suggested changing her Meloxicam to Nabumetone to se if it will work beter and I am giving her a steroid taper to see if that will improve her pain and swelling. She does need to follow up with her ortho doctor.

## 2022-09-16 ENCOUNTER — PROCEDURE VISIT (OUTPATIENT)
Dept: NEUROLOGY | Facility: CLINIC | Age: 47
End: 2022-09-16

## 2022-09-16 VITALS
DIASTOLIC BLOOD PRESSURE: 78 MMHG | SYSTOLIC BLOOD PRESSURE: 110 MMHG | HEIGHT: 60 IN | OXYGEN SATURATION: 96 % | HEART RATE: 99 BPM | WEIGHT: 137 LBS | BODY MASS INDEX: 26.9 KG/M2

## 2022-09-16 DIAGNOSIS — G43.719 INTRACTABLE CHRONIC MIGRAINE WITHOUT AURA AND WITHOUT STATUS MIGRAINOSUS: Primary | ICD-10-CM

## 2022-09-16 PROCEDURE — 64615 CHEMODENERV MUSC MIGRAINE: CPT | Performed by: PSYCHIATRY & NEUROLOGY

## 2022-09-16 NOTE — PROGRESS NOTES
CC:  Migraines.    HPI : Patient is in clinic for Botox injection.  Current headache frequency is approximately 6-8 headache days in a month with some headaches lasting for more than 4 hours.      Botox Procedure Note    Current headache frequency: 6-8 headaches days / month.    The risks and benefits were discussed with the patient. The patient was given the opportunity to ask questions. Informed consent form was signed.    Onabotulinumtoxin A was reconstituted with 0.9% normal saline. All injections sites were prepped with alcohol swab. Approximately 5 units of botox were injected into each of the following sites bilaterally as per routine migraine botox injection PREEMPT protocol: , procerus, frontalis, temporalis, occipitalis, upper cervical paraspinals, and trapezius.    The patient tolerated the procedure well. There were no immediate complications. The patient will follow up in approximately 12 weeks for her next injection.    Total amount of onabotulinumtoxin A injected was 155 units with 45 units wasted.    Note: She is reporting that even with Botox, Emgality and Qulipta, she is still getting 8 migraine days in a month on an average.  She reports that Emgality effect seems to be lasting only for 2 to 3 weeks.  I have advised her to start doing Emgality every 2015 and see how she does.  A sample Emgality injection was given that will help her change Emgality frequency to every 25-day.  Otherwise, I will see her back in 12 weeks for next injection.

## 2022-09-19 DIAGNOSIS — G43.719 INTRACTABLE CHRONIC MIGRAINE WITHOUT AURA AND WITHOUT STATUS MIGRAINOSUS: Primary | ICD-10-CM

## 2022-09-19 PROCEDURE — 64615 CHEMODENERV MUSC MIGRAINE: CPT | Performed by: PSYCHIATRY & NEUROLOGY

## 2022-09-22 DIAGNOSIS — G43.719 INTRACTABLE CHRONIC MIGRAINE WITHOUT AURA AND WITHOUT STATUS MIGRAINOSUS: ICD-10-CM

## 2022-09-22 RX ORDER — SUMATRIPTAN 100 MG/1
TABLET, FILM COATED ORAL
Qty: 6 TABLET | Refills: 1 | Status: SHIPPED | OUTPATIENT
Start: 2022-09-22 | End: 2022-10-17

## 2022-10-03 RX ORDER — HYDROXYZINE PAMOATE 50 MG/1
CAPSULE ORAL
Qty: 90 CAPSULE | OUTPATIENT
Start: 2022-10-03

## 2022-10-07 RX ORDER — ARIPIPRAZOLE 5 MG/1
TABLET ORAL
Qty: 30 TABLET | Refills: 1 | Status: SHIPPED | OUTPATIENT
Start: 2022-10-07 | End: 2022-12-13

## 2022-10-11 RX ORDER — TRAZODONE HYDROCHLORIDE 100 MG/1
100 TABLET ORAL NIGHTLY
COMMUNITY
End: 2022-10-11 | Stop reason: SDUPTHER

## 2022-10-11 RX ORDER — TRAZODONE HYDROCHLORIDE 100 MG/1
100 TABLET ORAL NIGHTLY
Qty: 90 TABLET | Refills: 1 | Status: SHIPPED | OUTPATIENT
Start: 2022-10-11 | End: 2023-03-16

## 2022-10-17 DIAGNOSIS — G43.719 INTRACTABLE CHRONIC MIGRAINE WITHOUT AURA AND WITHOUT STATUS MIGRAINOSUS: ICD-10-CM

## 2022-10-17 RX ORDER — HYDROXYZINE PAMOATE 50 MG/1
CAPSULE ORAL
Qty: 90 CAPSULE | Refills: 1 | Status: SHIPPED | OUTPATIENT
Start: 2022-10-17 | End: 2023-01-17

## 2022-10-17 RX ORDER — SUMATRIPTAN 100 MG/1
TABLET, FILM COATED ORAL
Qty: 6 TABLET | Refills: 0 | Status: SHIPPED | OUTPATIENT
Start: 2022-10-17 | End: 2022-12-21

## 2022-10-17 NOTE — TELEPHONE ENCOUNTER
Rx Refill Note  Requested Prescriptions     Pending Prescriptions Disp Refills   • SUMAtriptan (IMITREX) 100 MG tablet [Pharmacy Med Name: SUMAtriptan SUCC 100 MG TABLET] 6 tablet 1     Sig: TAKE ONE TABLET BY MOUTH AT ONSET OF HEADACHE; MAY REPEAT ONE TABLET IN 2 HOURS IF NEEDED. MAX OF 2 TABLETS DAILY      Last filled:09/22/2022  Last office visit with prescribing clinician: Visit date not found      Next office visit with prescribing clinician: 12/9/2022     Ruth Huang MA  10/17/22, 14:12 EDT     PHARMACY:    PT WILL NEED TO SET UP A F/U APPT BEFORE WE GIVE ANY REFILLS. PLEASE HAVE HER CALL THE OFFICE TO SET THIS UP WITH DR. BARKLEY.    Thank you,    ELIZABETH Covarrubias Dr.

## 2022-10-28 RX ORDER — CYCLOBENZAPRINE HCL 10 MG
TABLET ORAL
Qty: 90 TABLET | Refills: 1 | Status: SHIPPED | OUTPATIENT
Start: 2022-10-28 | End: 2023-01-17

## 2022-11-07 RX ORDER — VENLAFAXINE HYDROCHLORIDE 150 MG/1
CAPSULE, EXTENDED RELEASE ORAL
Qty: 30 CAPSULE | Refills: 3 | Status: SHIPPED | OUTPATIENT
Start: 2022-11-07 | End: 2023-03-16 | Stop reason: SDUPTHER

## 2022-12-09 ENCOUNTER — PROCEDURE VISIT (OUTPATIENT)
Dept: NEUROLOGY | Facility: CLINIC | Age: 47
End: 2022-12-09

## 2022-12-09 DIAGNOSIS — G43.719 INTRACTABLE CHRONIC MIGRAINE WITHOUT AURA AND WITHOUT STATUS MIGRAINOSUS: Primary | ICD-10-CM

## 2022-12-09 PROCEDURE — 64615 CHEMODENERV MUSC MIGRAINE: CPT | Performed by: PSYCHIATRY & NEUROLOGY

## 2022-12-09 NOTE — PROGRESS NOTES
CC:  Migraines.    HPI : Patient is in clinic for Botox injection.  Current headache frequency is approximately 6-8 headache days in a month with some headaches lasting for more than 4 hours.      Botox Procedure Note    Current headache frequency: 6-8 headaches days / month.    The risks and benefits were discussed with the patient. The patient was given the opportunity to ask questions. Informed consent form was signed.    Onabotulinumtoxin A was reconstituted with 0.9% normal saline. All injections sites were prepped with alcohol swab. Approximately 5 units of botox were injected into each of the following sites bilaterally as per routine migraine botox injection PREEMPT protocol: , procerus, frontalis, temporalis, occipitalis, upper cervical paraspinals, and trapezius.    The patient tolerated the procedure well. There were no immediate complications. The patient will follow up in approximately 12 weeks for her next injection.    Total amount of onabotulinumtoxin A injected was 155 units with 45 units wasted.    Note: She is reporting that after reducing the frequency of Ajovy to every 25 days instead of every 30 days, migraines are much better controlled.  She continues to take Qulipta and topiramate ER as well.  With this combination, migraines have improved significantly and she is reporting 6-8 migraine days in a month.  Continue with this combination and I will see her back in 12 weeks for next follow-up.

## 2022-12-13 RX ORDER — ARIPIPRAZOLE 5 MG/1
TABLET ORAL
Qty: 30 TABLET | Refills: 2 | Status: SHIPPED | OUTPATIENT
Start: 2022-12-13 | End: 2023-02-28

## 2022-12-20 DIAGNOSIS — G43.719 INTRACTABLE CHRONIC MIGRAINE WITHOUT AURA AND WITHOUT STATUS MIGRAINOSUS: ICD-10-CM

## 2022-12-21 RX ORDER — SUMATRIPTAN 100 MG/1
TABLET, FILM COATED ORAL
Qty: 6 TABLET | Refills: 0 | Status: SHIPPED | OUTPATIENT
Start: 2022-12-21 | End: 2023-01-16

## 2022-12-21 NOTE — TELEPHONE ENCOUNTER
Rx Refill Note  Requested Prescriptions     Pending Prescriptions Disp Refills   • SUMAtriptan (IMITREX) 100 MG tablet [Pharmacy Med Name: SUMAtriptan SUCC 100 MG TABLET] 6 tablet 0     Sig: TAKE ONE TABLET BY MOUTH AT ONSET OF HEADACHE; MAY REPEAT ONE TABLET IN 2 HOURS IF NEEDED. MAX 2 TABLETS PER DAY      Last filled: 10/17/22 with 0 sent in  Last office visit with prescribing clinician: 12/9/22    Next office visit with prescribing clinician: 3/8/2023     Reshma Dalal MA  12/21/22, 12:06 EST

## 2022-12-28 ENCOUNTER — TELEPHONE (OUTPATIENT)
Dept: FAMILY MEDICINE CLINIC | Facility: CLINIC | Age: 47
End: 2022-12-28

## 2023-01-14 DIAGNOSIS — G43.719 INTRACTABLE CHRONIC MIGRAINE WITHOUT AURA AND WITHOUT STATUS MIGRAINOSUS: ICD-10-CM

## 2023-01-16 RX ORDER — SUMATRIPTAN 100 MG/1
TABLET, FILM COATED ORAL
Qty: 6 TABLET | Refills: 0 | Status: SHIPPED | OUTPATIENT
Start: 2023-01-16 | End: 2023-02-03

## 2023-01-16 RX ORDER — GABAPENTIN 600 MG/1
TABLET ORAL
Qty: 150 TABLET | Refills: 3 | Status: SHIPPED | OUTPATIENT
Start: 2023-01-16

## 2023-01-17 RX ORDER — HYDROXYZINE PAMOATE 50 MG/1
CAPSULE ORAL
Qty: 90 CAPSULE | Refills: 1 | Status: SHIPPED | OUTPATIENT
Start: 2023-01-17 | End: 2023-04-06

## 2023-01-17 RX ORDER — CYCLOBENZAPRINE HCL 10 MG
TABLET ORAL
Qty: 90 TABLET | Refills: 1 | Status: SHIPPED | OUTPATIENT
Start: 2023-01-17 | End: 2023-02-28

## 2023-01-24 RX ORDER — PANTOPRAZOLE SODIUM 40 MG/1
TABLET, DELAYED RELEASE ORAL
Qty: 30 TABLET | Refills: 3 | Status: SHIPPED | OUTPATIENT
Start: 2023-01-24 | End: 2023-04-06

## 2023-02-03 DIAGNOSIS — G43.719 INTRACTABLE CHRONIC MIGRAINE WITHOUT AURA AND WITHOUT STATUS MIGRAINOSUS: ICD-10-CM

## 2023-02-03 RX ORDER — SUMATRIPTAN 100 MG/1
TABLET, FILM COATED ORAL
Qty: 6 TABLET | Refills: 0 | Status: SHIPPED | OUTPATIENT
Start: 2023-02-03 | End: 2023-02-22

## 2023-02-03 RX ORDER — GALCANEZUMAB 120 MG/ML
INJECTION, SOLUTION SUBCUTANEOUS
Qty: 1 ML | Refills: 11 | Status: SHIPPED | OUTPATIENT
Start: 2023-02-03

## 2023-02-03 RX ORDER — CEFUROXIME AXETIL 250 MG/1
TABLET ORAL
Qty: 1 ML | Refills: 6 | Status: SHIPPED | OUTPATIENT
Start: 2023-02-03

## 2023-02-14 RX ORDER — BUPROPION HYDROCHLORIDE 300 MG/1
TABLET ORAL
Qty: 90 TABLET | Refills: 1 | Status: SHIPPED | OUTPATIENT
Start: 2023-02-14

## 2023-02-22 ENCOUNTER — PATIENT MESSAGE (OUTPATIENT)
Dept: NEUROLOGY | Facility: CLINIC | Age: 48
End: 2023-02-22
Payer: COMMERCIAL

## 2023-02-22 DIAGNOSIS — G43.719 INTRACTABLE CHRONIC MIGRAINE WITHOUT AURA AND WITHOUT STATUS MIGRAINOSUS: ICD-10-CM

## 2023-02-22 RX ORDER — SUMATRIPTAN 100 MG/1
TABLET, FILM COATED ORAL
Qty: 9 TABLET | Refills: 3 | Status: SHIPPED | OUTPATIENT
Start: 2023-02-22

## 2023-02-22 RX ORDER — GALCANEZUMAB 120 MG/ML
INJECTION, SOLUTION SUBCUTANEOUS
Qty: 1 ML | Refills: 11 | OUTPATIENT
Start: 2023-02-22

## 2023-02-22 NOTE — TELEPHONE ENCOUNTER
Pt does not need Emgality filled as this script was called in on 02/03/23 with 11 refills. Sumatriptan was sent.   Debridement Text: The wound edges were debrided prior to proceeding with the closure to facilitate wound healing.

## 2023-02-23 NOTE — TELEPHONE ENCOUNTER
From: Molly Ferguson  To: Darrell Del Valle  Sent: 2/22/2023 7:11 PM EST  Subject: Emgality    Why was the refill for Emgality denied?

## 2023-02-28 ENCOUNTER — TELEMEDICINE (OUTPATIENT)
Dept: FAMILY MEDICINE CLINIC | Facility: CLINIC | Age: 48
End: 2023-02-28
Payer: COMMERCIAL

## 2023-02-28 VITALS — WEIGHT: 151 LBS | BODY MASS INDEX: 29.64 KG/M2 | HEIGHT: 60 IN

## 2023-02-28 DIAGNOSIS — F32.A DEPRESSIVE DISORDER: Primary | ICD-10-CM

## 2023-02-28 PROCEDURE — 99213 OFFICE O/P EST LOW 20 MIN: CPT | Performed by: PHYSICIAN ASSISTANT

## 2023-02-28 RX ORDER — MULTIVIT-MIN/IRON/FOLIC ACID/K 18-600-40
CAPSULE ORAL EVERY 24 HOURS
COMMUNITY
End: 2023-03-31

## 2023-02-28 RX ORDER — MELOXICAM 15 MG/1
TABLET ORAL
COMMUNITY
Start: 2023-01-12 | End: 2023-03-16

## 2023-02-28 RX ORDER — ONDANSETRON 4 MG/1
TABLET, FILM COATED ORAL
COMMUNITY
Start: 2023-01-12 | End: 2023-03-08

## 2023-02-28 RX ORDER — ARIPIPRAZOLE 10 MG/1
10 TABLET ORAL DAILY
Qty: 30 TABLET | Refills: 5 | Status: SHIPPED | OUTPATIENT
Start: 2023-02-28

## 2023-02-28 RX ORDER — CYCLOBENZAPRINE HCL 10 MG
TABLET ORAL
Qty: 120 TABLET | Refills: 5 | Status: SHIPPED | OUTPATIENT
Start: 2023-02-28

## 2023-02-28 NOTE — ASSESSMENT & PLAN NOTE
Patient's depression is recurrent and is severe without psychosis. Their depression is currently active and the condition is worsening. This will be reassessed in 3 months. F/U as described:patient referred to Mental Health Specialist and I am suggesting that we increase her Abilify to 10 mg daily but I really think that she probably would benefit from some counseling and a professional psychiatrist since she does take several different types of medications for her anxiety and depression there may need to be some adjustments to the types of medicines she is currently taking.  I also believe she would benefit from counseling that can be offered..  She is agreeable to this plan and will increase her Abilify to 10 mg until she can schedule and get into see someone in psych.  I have given her a couple of psychiatry offices here in town that she can contact.
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
## 2023-03-08 ENCOUNTER — PROCEDURE VISIT (OUTPATIENT)
Dept: NEUROLOGY | Facility: CLINIC | Age: 48
End: 2023-03-08
Payer: COMMERCIAL

## 2023-03-08 DIAGNOSIS — G43.719 INTRACTABLE CHRONIC MIGRAINE WITHOUT AURA AND WITHOUT STATUS MIGRAINOSUS: Primary | ICD-10-CM

## 2023-03-08 PROCEDURE — 64615 CHEMODENERV MUSC MIGRAINE: CPT | Performed by: PSYCHIATRY & NEUROLOGY

## 2023-03-16 ENCOUNTER — TELEMEDICINE (OUTPATIENT)
Dept: FAMILY MEDICINE CLINIC | Facility: CLINIC | Age: 48
End: 2023-03-16
Payer: COMMERCIAL

## 2023-03-16 VITALS — WEIGHT: 157 LBS | BODY MASS INDEX: 30.82 KG/M2 | HEIGHT: 60 IN

## 2023-03-16 DIAGNOSIS — G47.00 PERSISTENT INSOMNIA: ICD-10-CM

## 2023-03-16 DIAGNOSIS — F33.41 RECURRENT MAJOR DEPRESSIVE DISORDER, IN PARTIAL REMISSION: Primary | ICD-10-CM

## 2023-03-16 PROCEDURE — 99213 OFFICE O/P EST LOW 20 MIN: CPT | Performed by: PHYSICIAN ASSISTANT

## 2023-03-16 RX ORDER — ARIPIPRAZOLE 5 MG/1
TABLET ORAL
Qty: 90 TABLET | Refills: 0 | Status: SHIPPED | OUTPATIENT
Start: 2023-03-16 | End: 2023-03-31 | Stop reason: DRUGHIGH

## 2023-03-16 RX ORDER — VENLAFAXINE HYDROCHLORIDE 150 MG/1
150 CAPSULE, EXTENDED RELEASE ORAL DAILY
Qty: 90 CAPSULE | Refills: 3 | Status: SHIPPED | OUTPATIENT
Start: 2023-03-16

## 2023-03-16 RX ORDER — TRAZODONE HYDROCHLORIDE 100 MG/1
TABLET ORAL
Qty: 180 TABLET | Refills: 3 | Status: SHIPPED | OUTPATIENT
Start: 2023-03-16 | End: 2023-04-08

## 2023-03-16 NOTE — ASSESSMENT & PLAN NOTE
I did tell her she could increase her Trazodone to 150-200mg at bedtime to help with sleep. New Rx sent to her pharmacy.

## 2023-03-16 NOTE — ASSESSMENT & PLAN NOTE
Patient's depression is  Well controlled she is requesting a refill of the Venlafaxine which has been sent to her pharmacy.

## 2023-03-16 NOTE — PROGRESS NOTES
"Chief Complaint  Depression (Patient needs a refill on her med) and Obesity    Subjective  Patient presents during the COVID-19  pandemic/federally declared state of public health emergency.  For today's visit this provider is located at [facility]. Patient was seen today through synchronous audio/video technology using D[employer]Me technology. Verbal consent was obtained. The patient was located at home. Vitals signs were not obtained due to lack of home monitoring access. Time spent with patient was 15 minutes.        Molly Ferguson presents to [facility]  History of Present Illness patient is being seen today for a refill of her venlafaxine and to discuss issues with sleep.  She says the trazodone used to help her fall asleep and stay asleep but lately she has been having trouble again getting to sleep and wondered if she could increase her dose of trazodone.  She also wanted to know if we prescribed anything to help with weight loss and I told her that there were some medicines that we did gave her a list and suggested she contact her insurance company and see if they will pay for weight loss medication and if so she needs to schedule an in office visit so we can document weight,  BMI etc.    Objective   Vital Signs:   Ht 152.4 cm (60\")   Wt 71.2 kg (157 lb) Comment: Pt reported vitals  BMI 30.66 kg/m²     Body mass index is 30.66 kg/m².    Review of Systems   Constitutional: Negative for chills, fatigue and fever.   Respiratory: Negative for cough and shortness of breath.    Cardiovascular: Negative for chest pain and palpitations.   Musculoskeletal: Negative for back pain and myalgias.   Neurological: Negative for dizziness and headache.   Psychiatric/Behavioral: Positive for sleep disturbance. Negative for depressed mood. The patient is not nervous/anxious.        Past History:  Medical History: has a past medical history of Depression, Depressive disorder, " History of anxiety, History of hyperlipidemia, Migraines, Patellar malalignment syndrome of left knee, PONV (postoperative nausea and vomiting), Tennis elbow, Trigger thumb, and Visual disturbances.   Surgical History: has a past surgical history that includes Tubal ligation; Hysterectomy; Shoulder surgery (Left, 2020);  section; Colonoscopy; Esophagogastroduodenoscopy; Tennis Elbow Release (Left, 11/10/2020); and Essure tubal ligation.   Family History: family history includes Alzheimer's disease in some other family members; Breast cancer in her maternal grandmother; Cancer in an other family member; Dementia in her maternal grandmother; Diabetes in her father; Heart disease in her father and another family member; Hyperlipidemia in her father; Hypertension in her father and maternal grandmother; Parkinsonism in her paternal grandfather and another family member; Skin cancer in her mother.   Social History: reports that she has been smoking cigarettes. She has a 2.50 pack-year smoking history. She has never used smokeless tobacco. She reports current alcohol use. She reports that she does not use drugs.      Current Outpatient Medications:   •  ARIPiprazole (ABILIFY) 10 MG tablet, Take 1 tablet by mouth Daily., Disp: 30 tablet, Rfl: 5  •  Atogepant (Qulipta) 60 MG tablet, Take  by mouth., Disp: , Rfl:   •  buPROPion XL (WELLBUTRIN XL) 300 MG 24 hr tablet, TAKE ONE TABLET BY MOUTH DAILY, Disp: 90 tablet, Rfl: 1  •  Cholecalciferol (Vitamin D) 50 MCG ( UT) capsule, Daily., Disp: , Rfl:   •  cyclobenzaprine (FLEXERIL) 10 MG tablet, Take 2 twice a day for muscle spasm, Disp: 120 tablet, Rfl: 5  •  Emgality 120 MG/ML auto-injector pen, INJECT 1ML UNDER THE SKIN INTO THE APPROPRIATE AREA AS DIRECTED FOR ONE DOSE EVERY 30 DAYS, Disp: 1 mL, Rfl: 11  •  gabapentin (NEURONTIN) 600 MG tablet, TAKE TWO TABLETS BY MOUTH EVERY MORNING AND ONE TABLET AT NOON AND 2 TABLETS AT NIGHT, Disp: 150 tablet, Rfl: 3  •   hydrOXYzine pamoate (VISTARIL) 50 MG capsule, TAKE ONE CAPSULE BY MOUTH EVERY 8 HOURS AS NEEDED FOR ANXIETY, Disp: 90 capsule, Rfl: 1  •  OnabotulinumtoxinA 200 units reconstituted solution, Inject 200 units IM into head neck shoulders every 12 weeks per FDA protocol Dx G43.719 SHIP TO SANDRITA LUCIO, Disp: 1 each, Rfl: 3  •  pantoprazole (PROTONIX) 40 MG EC tablet, TAKE ONE TABLET BY MOUTH DAILY, Disp: 30 tablet, Rfl: 3  •  SUMAtriptan (IMITREX) 100 MG tablet, TAKE ONE TABLET BY MOUTH AT ONSET OF HEADACHE; MAY REPEAT ONE TABLET IN 2 HOURS IF NEEDED. MAX OF 2 TABLETS PER DAY, Disp: 9 tablet, Rfl: 3  •  SUMAtriptan Succinate (IMITREX) 6 MG/0.5ML injection, INJECT PRESCRIBED DOES AT ONSET OF HEADACHE. MAY REPEAT DOSE ONE TIME IN 1 HOUR IF HEADACHE NOT RELIEVED., Disp: 1 mL, Rfl: 6  •  Topiramate ER (Trokendi XR) 200 MG capsule sustained-release 24 hr, Take 1 capsule by mouth Daily., Disp: 90 capsule, Rfl: 2  •  traZODone (DESYREL) 100 MG tablet, Take 1 and 1/2 tablet to 2 at bedtime, Disp: 180 tablet, Rfl: 3  •  ubrogepant (UBRELVY) 50 MG tablet, Take 2 tablets by mouth As Needed (As needed) for up to 1 dose., Disp: 10 tablet, Rfl: 3  •  venlafaxine XR (EFFEXOR-XR) 150 MG 24 hr capsule, Take 1 capsule by mouth Daily., Disp: 90 capsule, Rfl: 3  •  ARIPiprazole (ABILIFY) 5 MG tablet, TAKE ONE TABLET BY MOUTH DAILY, Disp: 90 tablet, Rfl: 0    Allergies: Codeine, Nickel, Penicillins, Morphine, and Morphine and related    Physical Exam  Constitutional:       Appearance: Normal appearance.   Neurological:      Mental Status: She is alert and oriented to person, place, and time.   Psychiatric:         Mood and Affect: Mood normal.         Behavior: Behavior normal.          Result Review :                   Assessment and Plan    Diagnoses and all orders for this visit:    1. Recurrent major depressive disorder, in partial remission (HCC) (Primary)  Assessment & Plan:  Patient's depression is  Well controlled she is  requesting a refill of the Venlafaxine which has been sent to her pharmacy.        2. Persistent insomnia  Assessment & Plan:  I did tell her she could increase her Trazodone to 150-200mg at bedtime to help with sleep. New Rx sent to her pharmacy.       Other orders  -     venlafaxine XR (EFFEXOR-XR) 150 MG 24 hr capsule; Take 1 capsule by mouth Daily.  Dispense: 90 capsule; Refill: 3  -     traZODone (DESYREL) 100 MG tablet; Take 1 and 1/2 tablet to 2 at bedtime  Dispense: 180 tablet; Refill: 3      Follow Up   No follow-ups on file.  Patient was given instructions and counseling regarding her condition or for health maintenance advice. Please see specific information pulled into the AVS if appropriate.     Andreia Vargas PA-C

## 2023-03-31 ENCOUNTER — OFFICE VISIT (OUTPATIENT)
Dept: FAMILY MEDICINE CLINIC | Facility: CLINIC | Age: 48
End: 2023-03-31
Payer: COMMERCIAL

## 2023-03-31 VITALS
DIASTOLIC BLOOD PRESSURE: 84 MMHG | HEART RATE: 94 BPM | SYSTOLIC BLOOD PRESSURE: 126 MMHG | WEIGHT: 157.7 LBS | OXYGEN SATURATION: 97 % | BODY MASS INDEX: 30.96 KG/M2 | HEIGHT: 60 IN

## 2023-03-31 DIAGNOSIS — E66.09 CLASS 1 OBESITY DUE TO EXCESS CALORIES WITHOUT SERIOUS COMORBIDITY WITH BODY MASS INDEX (BMI) OF 30.0 TO 30.9 IN ADULT: Primary | ICD-10-CM

## 2023-03-31 PROBLEM — E66.811 CLASS 1 OBESITY WITHOUT SERIOUS COMORBIDITY WITH BODY MASS INDEX (BMI) OF 30.0 TO 30.9 IN ADULT: Status: ACTIVE | Noted: 2023-03-31

## 2023-03-31 PROBLEM — E66.9 CLASS 1 OBESITY WITHOUT SERIOUS COMORBIDITY WITH BODY MASS INDEX (BMI) OF 30.0 TO 30.9 IN ADULT: Status: ACTIVE | Noted: 2023-03-31

## 2023-03-31 RX ORDER — VILAZODONE HYDROCHLORIDE 10 MG/1
TABLET ORAL
COMMUNITY
Start: 2023-03-29

## 2023-03-31 NOTE — PROGRESS NOTES
"Answers for HPI/ROS submitted by the patient on 3/24/2023  Please describe your symptoms.: Weight loss  Have you had these symptoms before?: No  How long have you been having these symptoms?: Greater than 2 weeks  Please list any medications you are currently taking for this condition.: No change in meds  What is the primary reason for your visit?: Other    Chief Complaint  Obesity (Patient would like to discuss getting shots)    Subjective          Molly Ferguson presents to [facility]  History of Present Illness she comes in today to specifically discuss possibly getting on the Wegovy injection for weight loss.  She has checked with her insurance company and they told her that if she meets criteria that it is covered.  He states that she has tried diet in fact all kinds of diets over the years and exercises on a regular basis and nothing that she has done has been helpful.  She does take medications that cause weight gain including Abilify, gabapentin and venlafaxine.    Objective   Vital Signs:   /84 (BP Location: Left arm)   Pulse 94   Ht 152.4 cm (60\")   Wt 71.5 kg (157 lb 11.2 oz)   SpO2 97%   BMI 30.80 kg/m²     Body mass index is 30.8 kg/m².    Review of Systems   Constitutional: Negative for chills, fatigue and fever.   Respiratory: Negative for cough and shortness of breath.    Cardiovascular: Negative for chest pain and palpitations.   Musculoskeletal: Negative for back pain and myalgias.   Neurological: Negative for dizziness and headache.   Psychiatric/Behavioral: Negative for depressed mood. The patient is not nervous/anxious.        Past History:  Medical History: has a past medical history of Depression, Depressive disorder, History of anxiety, History of hyperlipidemia, Migraines, Patellar malalignment syndrome of left knee, PONV (postoperative nausea and vomiting), Tennis elbow, Trigger thumb, and Visual disturbances.   Surgical History: has a past " surgical history that includes Tubal ligation; Hysterectomy; Shoulder surgery (Left, 2020);  section; Colonoscopy; Esophagogastroduodenoscopy; Tennis Elbow Release (Left, 11/10/2020); and Essure tubal ligation.   Family History: family history includes Alzheimer's disease in some other family members; Breast cancer in her maternal grandmother; Cancer in an other family member; Dementia in her maternal grandmother; Diabetes in her father; Heart disease in her father and another family member; Hyperlipidemia in her father; Hypertension in her father and maternal grandmother; Parkinsonism in her paternal grandfather and another family member; Skin cancer in her mother.   Social History: reports that she has been smoking cigarettes. She has a 2.50 pack-year smoking history. She has never used smokeless tobacco. She reports current alcohol use. She reports that she does not use drugs.      Current Outpatient Medications:   •  ARIPiprazole (ABILIFY) 10 MG tablet, Take 1 tablet by mouth Daily., Disp: 30 tablet, Rfl: 5  •  Atogepant (Qulipta) 60 MG tablet, Take  by mouth., Disp: , Rfl:   •  buPROPion XL (WELLBUTRIN XL) 300 MG 24 hr tablet, TAKE ONE TABLET BY MOUTH DAILY, Disp: 90 tablet, Rfl: 1  •  cyclobenzaprine (FLEXERIL) 10 MG tablet, Take 2 twice a day for muscle spasm, Disp: 120 tablet, Rfl: 5  •  Emgality 120 MG/ML auto-injector pen, INJECT 1ML UNDER THE SKIN INTO THE APPROPRIATE AREA AS DIRECTED FOR ONE DOSE EVERY 30 DAYS, Disp: 1 mL, Rfl: 11  •  gabapentin (NEURONTIN) 600 MG tablet, TAKE TWO TABLETS BY MOUTH EVERY MORNING AND ONE TABLET AT NOON AND 2 TABLETS AT NIGHT, Disp: 150 tablet, Rfl: 3  •  hydrOXYzine pamoate (VISTARIL) 50 MG capsule, TAKE ONE CAPSULE BY MOUTH EVERY 8 HOURS AS NEEDED FOR ANXIETY, Disp: 90 capsule, Rfl: 1  •  OnabotulinumtoxinA 200 units reconstituted solution, Inject 200 units IM into head neck shoulders every 12 weeks per FDA protocol Dx G43.719 SHIP TO SANDRITA LUCIO,  Disp: 1 each, Rfl: 3  •  pantoprazole (PROTONIX) 40 MG EC tablet, TAKE ONE TABLET BY MOUTH DAILY, Disp: 30 tablet, Rfl: 3  •  SUMAtriptan (IMITREX) 100 MG tablet, TAKE ONE TABLET BY MOUTH AT ONSET OF HEADACHE; MAY REPEAT ONE TABLET IN 2 HOURS IF NEEDED. MAX OF 2 TABLETS PER DAY, Disp: 9 tablet, Rfl: 3  •  SUMAtriptan Succinate (IMITREX) 6 MG/0.5ML injection, INJECT PRESCRIBED DOES AT ONSET OF HEADACHE. MAY REPEAT DOSE ONE TIME IN 1 HOUR IF HEADACHE NOT RELIEVED., Disp: 1 mL, Rfl: 6  •  Topiramate ER (Trokendi XR) 200 MG capsule sustained-release 24 hr, Take 1 capsule by mouth Daily., Disp: 90 capsule, Rfl: 2  •  traZODone (DESYREL) 100 MG tablet, Take 1 and 1/2 tablet to 2 at bedtime, Disp: 180 tablet, Rfl: 3  •  ubrogepant (UBRELVY) 50 MG tablet, Take 2 tablets by mouth As Needed (As needed) for up to 1 dose., Disp: 10 tablet, Rfl: 3  •  venlafaxine XR (EFFEXOR-XR) 150 MG 24 hr capsule, Take 1 capsule by mouth Daily., Disp: 90 capsule, Rfl: 3  •  Semaglutide-Weight Management 0.25 MG/0.5ML solution auto-injector, Inject 0.25 mg under the skin into the appropriate area as directed 1 (One) Time Per Week., Disp: 2 mL, Rfl: 0  •  Semaglutide-Weight Management 0.5 MG/0.5ML solution auto-injector, Inject 0.5 mL under the skin into the appropriate area as directed 1 (One) Time Per Week., Disp: 2 mL, Rfl: 0  •  vilazodone (VIIBRYD) 10 MG tablet tablet, , Disp: , Rfl:     Allergies: Codeine, Nickel, Penicillins, Morphine, and Morphine and related    Physical Exam  Constitutional:       Appearance: She is obese.   Neurological:      Mental Status: She is alert and oriented to person, place, and time.   Psychiatric:         Mood and Affect: Mood normal.         Behavior: Behavior normal.          Result Review :                   Assessment and Plan    Diagnoses and all orders for this visit:    1. Class 1 obesity due to excess calories without serious comorbidity with body mass index (BMI) of 30.0 to 30.9 in adult  (Primary)  Assessment & Plan:  Patient's (Body mass index is 30.8 kg/m².) indicates that they are obese (BMI >30) with health conditions that include none . Weight is unchanged. BMI  is above average; BMI management plan is completed. We discussed low calorie, low carb based diet program, portion control, increasing exercise, management of depression/anxiety/stress to control compensatory eating and pharmacologic options including Wegovey.       Other orders  -     Semaglutide-Weight Management 0.25 MG/0.5ML solution auto-injector; Inject 0.25 mg under the skin into the appropriate area as directed 1 (One) Time Per Week.  Dispense: 2 mL; Refill: 0  -     Semaglutide-Weight Management 0.5 MG/0.5ML solution auto-injector; Inject 0.5 mL under the skin into the appropriate area as directed 1 (One) Time Per Week.  Dispense: 2 mL; Refill: 0      Follow Up   Return in about 5 weeks (around 5/5/2023) for Recheck.  Patient was given instructions and counseling regarding her condition or for health maintenance advice. Please see specific information pulled into the AVS if appropriate.     Andreia Vargas PA-C

## 2023-03-31 NOTE — ASSESSMENT & PLAN NOTE
Patient's (Body mass index is 30.8 kg/m².) indicates that they are obese (BMI >30) with health conditions that include none . Weight is unchanged. BMI  is above average; BMI management plan is completed. We discussed low calorie, low carb based diet program, portion control, increasing exercise, management of depression/anxiety/stress to control compensatory eating and pharmacologic options including Wegovey.

## 2023-04-06 RX ORDER — HYDROXYZINE PAMOATE 50 MG/1
CAPSULE ORAL
Qty: 90 CAPSULE | Refills: 1 | Status: SHIPPED | OUTPATIENT
Start: 2023-04-06

## 2023-04-06 RX ORDER — PANTOPRAZOLE SODIUM 40 MG/1
TABLET, DELAYED RELEASE ORAL
Qty: 90 TABLET | Refills: 2 | Status: SHIPPED | OUTPATIENT
Start: 2023-04-06

## 2023-04-08 RX ORDER — TRAZODONE HYDROCHLORIDE 100 MG/1
TABLET ORAL
Qty: 90 TABLET | Refills: 1 | Status: SHIPPED | OUTPATIENT
Start: 2023-04-08

## 2023-05-03 RX ORDER — TOPIRAMATE 200 MG/1
CAPSULE, EXTENDED RELEASE ORAL
Qty: 90 CAPSULE | Refills: 2 | Status: SHIPPED | OUTPATIENT
Start: 2023-05-03

## 2023-05-03 NOTE — TELEPHONE ENCOUNTER
Rx Refill Note  Requested Prescriptions     Pending Prescriptions Disp Refills   • Trokendi  MG capsule sustained-release 24 hr [Pharmacy Med Name: TROKENDI  MG CAPSULE] 90 capsule 2     Sig: TAKE ONE CAPSULE BY MOUTH DAILY      Last filled:03/14/22. Sent  Last office visit with prescribing clinician: Visit date not found      Next office visit with prescribing clinician: 6/9/2023     Bassem Lassiter MA  05/03/23, 10:13 EDT

## 2023-05-19 RX ORDER — METHYLPREDNISOLONE 4 MG/1
TABLET ORAL
Qty: 1 EACH | Refills: 0 | Status: SHIPPED | OUTPATIENT
Start: 2023-05-19

## 2023-05-26 ENCOUNTER — OFFICE VISIT (OUTPATIENT)
Dept: FAMILY MEDICINE CLINIC | Facility: CLINIC | Age: 48
End: 2023-05-26
Payer: COMMERCIAL

## 2023-05-26 VITALS
HEART RATE: 71 BPM | SYSTOLIC BLOOD PRESSURE: 118 MMHG | HEIGHT: 60 IN | DIASTOLIC BLOOD PRESSURE: 80 MMHG | BODY MASS INDEX: 31.69 KG/M2 | WEIGHT: 161.4 LBS | OXYGEN SATURATION: 98 %

## 2023-05-26 DIAGNOSIS — E66.09 CLASS 1 OBESITY DUE TO EXCESS CALORIES WITHOUT SERIOUS COMORBIDITY WITH BODY MASS INDEX (BMI) OF 30.0 TO 30.9 IN ADULT: Primary | ICD-10-CM

## 2023-05-26 DIAGNOSIS — M62.838 NECK MUSCLE SPASM: ICD-10-CM

## 2023-05-26 RX ORDER — BACLOFEN 20 MG/1
20 TABLET ORAL 3 TIMES DAILY
Qty: 90 TABLET | Refills: 5 | Status: SHIPPED | OUTPATIENT
Start: 2023-05-26

## 2023-05-26 NOTE — ASSESSMENT & PLAN NOTE
Patient's (Body mass index is 32.05 kg/m².) indicates that they are obese (BMI >30) with health conditions that include none . Weight is unchanged. BMI  is above average; BMI management plan is completed. We discussed low calorie, low carb based diet program, portion control, increasing exercise, management of depression/anxiety/stress to control compensatory eating and pharmacologic options including wegovey.

## 2023-05-26 NOTE — ASSESSMENT & PLAN NOTE
We will see if baclofen works any better I am going to stop her cyclobenzaprine and put her on 20 mg baclofen 3 times a day.  She can call or return if symptoms persist or worsen.

## 2023-05-26 NOTE — PROGRESS NOTES
"Chief Complaint  Obesity (Follow Up)    Subjective          Molly Ferguson presents to [facility]  History of Present Illness patient is here today for a follow-up on her weight loss medication and she states that she is still only taking the 0.25 mg but she is tolerating it just fine and is ready to go up to the 0.05 dose.  She also quit smoking and she thinks that having quit smoking has stalled a little bit of her weight loss but she is motivated to increase her exercise and change her diet and she would like to give it some more time.  She also has chronic neck spasms and she does not think that the cyclobenzaprine is working well anymore and she like to try different muscle relaxer.    Objective   Vital Signs:   /80 (BP Location: Right arm)   Pulse 71   Ht 151.1 cm (59.5\")   Wt 73.2 kg (161 lb 6.4 oz)   SpO2 98%   BMI 32.05 kg/m²     Body mass index is 32.05 kg/m².    Review of Systems   Constitutional: Negative for chills, fatigue and fever.   Respiratory: Negative for cough and shortness of breath.    Cardiovascular: Negative for chest pain and palpitations.   Musculoskeletal: Positive for neck pain and neck stiffness. Negative for back pain and myalgias.   Neurological: Negative for dizziness and headache.   Psychiatric/Behavioral: Negative for depressed mood. The patient is not nervous/anxious.        Past History:  Medical History: has a past medical history of Depression, Depressive disorder, History of anxiety, History of hyperlipidemia, Migraines, Patellar malalignment syndrome of left knee, PONV (postoperative nausea and vomiting), Tennis elbow, Trigger thumb, and Visual disturbances.   Surgical History: has a past surgical history that includes Tubal ligation; Hysterectomy; Shoulder surgery (Left, 2020);  section; Colonoscopy; Esophagogastroduodenoscopy; Tennis Elbow Release (Left, 11/10/2020); and Essure tubal ligation.   Family History: " family history includes Alzheimer's disease in some other family members; Breast cancer in her maternal grandmother; Cancer in an other family member; Dementia in her maternal grandmother; Diabetes in her father; Heart disease in her father and another family member; Hyperlipidemia in her father; Hypertension in her father and maternal grandmother; Parkinsonism in her paternal grandfather and another family member; Skin cancer in her mother.   Social History: reports that she has been smoking cigarettes. She has a 2.50 pack-year smoking history. She has never used smokeless tobacco. She reports current alcohol use. She reports that she does not use drugs.      Current Outpatient Medications:   •  ARIPiprazole (ABILIFY) 10 MG tablet, Take 1 tablet by mouth Daily., Disp: 30 tablet, Rfl: 5  •  Atogepant (Qulipta) 60 MG tablet, Take  by mouth., Disp: , Rfl:   •  buPROPion XL (WELLBUTRIN XL) 300 MG 24 hr tablet, TAKE ONE TABLET BY MOUTH DAILY, Disp: 90 tablet, Rfl: 1  •  Emgality 120 MG/ML auto-injector pen, INJECT 1ML UNDER THE SKIN INTO THE APPROPRIATE AREA AS DIRECTED FOR ONE DOSE EVERY 30 DAYS, Disp: 1 mL, Rfl: 11  •  gabapentin (NEURONTIN) 600 MG tablet, TAKE TWO TABLETS BY MOUTH EVERY MORNING AND ONE TABLET AT NOON AND 2 TABLETS AT NIGHT, Disp: 150 tablet, Rfl: 3  •  hydrOXYzine pamoate (VISTARIL) 50 MG capsule, TAKE ONE CAPSULE BY MOUTH EVERY 8 HOURS AS NEEDED FOR ANXIETY, Disp: 90 capsule, Rfl: 1  •  methylPREDNISolone (MEDROL) 4 MG dose pack, Take as directed on package instructions., Disp: 1 each, Rfl: 0  •  OnabotulinumtoxinA 200 units reconstituted solution, Inject 200 units IM into head neck shoulders every 12 weeks per FDA protocol Dx G43.719 SHIP TO SANDRITA HOLDEN., Disp: 1 each, Rfl: 3  •  pantoprazole (PROTONIX) 40 MG EC tablet, TAKE ONE TABLET BY MOUTH DAILY, Disp: 90 tablet, Rfl: 2  •  Semaglutide-Weight Management 0.5 MG/0.5ML solution auto-injector, Inject 0.5 mL under the skin into the appropriate  area as directed 1 (One) Time Per Week., Disp: 2 mL, Rfl: 0  •  SUMAtriptan (IMITREX) 100 MG tablet, TAKE ONE TABLET BY MOUTH AT ONSET OF HEADACHE; MAY REPEAT ONE TABLET IN 2 HOURS IF NEEDED. MAX OF 2 TABLETS PER DAY, Disp: 9 tablet, Rfl: 3  •  SUMAtriptan Succinate (IMITREX) 6 MG/0.5ML injection, INJECT PRESCRIBED DOES AT ONSET OF HEADACHE. MAY REPEAT DOSE ONE TIME IN 1 HOUR IF HEADACHE NOT RELIEVED., Disp: 1 mL, Rfl: 6  •  traZODone (DESYREL) 100 MG tablet, TAKE ONE TABLET BY MOUTH ONCE NIGHTLY, Disp: 90 tablet, Rfl: 1  •  Trokendi  MG capsule sustained-release 24 hr, TAKE ONE CAPSULE BY MOUTH DAILY, Disp: 90 capsule, Rfl: 2  •  ubrogepant (UBRELVY) 50 MG tablet, Take 2 tablets by mouth As Needed (As needed) for up to 1 dose., Disp: 10 tablet, Rfl: 3  •  vilazodone (VIIBRYD) 10 MG tablet tablet, , Disp: , Rfl:   •  baclofen (LIORESAL) 20 MG tablet, Take 1 tablet by mouth 3 (Three) Times a Day., Disp: 90 tablet, Rfl: 5  •  [START ON 6/16/2023] Semaglutide-Weight Management 1 MG/0.5ML solution auto-injector, Inject 0.5 mL under the skin into the appropriate area as directed 1 (One) Time Per Week., Disp: 2 mL, Rfl: 1  •  venlafaxine XR (EFFEXOR-XR) 150 MG 24 hr capsule, Take 1 capsule by mouth Daily., Disp: 90 capsule, Rfl: 3    Allergies: Codeine, Nickel, Penicillins, Morphine, and Morphine and related    Physical Exam  Constitutional:       Appearance: Normal appearance.   Musculoskeletal:      Cervical back: Normal range of motion. Pain with movement and muscular tenderness present.   Neurological:      Mental Status: She is alert and oriented to person, place, and time.   Psychiatric:         Mood and Affect: Mood normal.         Behavior: Behavior normal.          Result Review :                   Assessment and Plan    Diagnoses and all orders for this visit:    1. Class 1 obesity due to excess calories without serious comorbidity with body mass index (BMI) of 30.0 to 30.9 in adult (Primary)  Assessment &  Plan:  Patient's (Body mass index is 32.05 kg/m².) indicates that they are obese (BMI >30) with health conditions that include none . Weight is unchanged. BMI  is above average; BMI management plan is completed. We discussed low calorie, low carb based diet program, portion control, increasing exercise, management of depression/anxiety/stress to control compensatory eating and pharmacologic options including wegovey.       2. Neck muscle spasm  Assessment & Plan:  We will see if baclofen works any better I am going to stop her cyclobenzaprine and put her on 20 mg baclofen 3 times a day.  She can call or return if symptoms persist or worsen.      Other orders  -     Semaglutide-Weight Management 1 MG/0.5ML solution auto-injector; Inject 0.5 mL under the skin into the appropriate area as directed 1 (One) Time Per Week.  Dispense: 2 mL; Refill: 1  -     baclofen (LIORESAL) 20 MG tablet; Take 1 tablet by mouth 3 (Three) Times a Day.  Dispense: 90 tablet; Refill: 5      Follow Up   Return in about 3 months (around 8/26/2023) for Recheck.  Patient was given instructions and counseling regarding her condition or for health maintenance advice. Please see specific information pulled into the AVS if appropriate.     CHRISTIANO [place] for HPI/ROS submitted by the patient on 5/19/2023  Please describe your symptoms.: FU on Wegovy  Have you had these symptoms before?: No  How long have you been having these symptoms?: Greater than 2 weeks  What is the primary reason for your visit?: Other

## 2023-06-08 ENCOUNTER — PROCEDURE VISIT (OUTPATIENT)
Dept: NEUROLOGY | Facility: CLINIC | Age: 48
End: 2023-06-08
Payer: COMMERCIAL

## 2023-06-08 DIAGNOSIS — G43.719 INTRACTABLE CHRONIC MIGRAINE WITHOUT AURA AND WITHOUT STATUS MIGRAINOSUS: Primary | ICD-10-CM

## 2023-06-08 RX ORDER — ATOGEPANT 60 MG/1
60 TABLET ORAL DAILY
Qty: 30 TABLET | Refills: 6 | Status: SHIPPED | OUTPATIENT
Start: 2023-06-08 | End: 2023-07-08

## 2023-06-08 NOTE — PROGRESS NOTES
CC:  Migraines.    HPI : Patient is in clinic for Botox injection.  Current headache frequency is approximately 6-8 headache days in a month with some headaches lasting for more than 4 hours.      Botox Procedure Note    Current headache frequency: 6-8 headaches days / month.    The risks and benefits were discussed with the patient. The patient was given the opportunity to ask questions. Informed consent form was signed.    Onabotulinumtoxin A was reconstituted with 0.9% normal saline. All injections sites were prepped with alcohol swab. Approximately 5 units of botox were injected into each of the following sites bilaterally as per routine migraine botox injection PREEMPT protocol: , procerus, frontalis, temporalis, occipitalis, upper cervical paraspinals, and trapezius.    The patient tolerated the procedure well. There were no immediate complications. The patient will follow up in approximately 12 weeks for her next injection.    Total amount of onabotulinumtoxin A injected was 155 units with 45 units wasted.    Note: She is reporting that she is not on Qulipta for about a month.  She stopped receiving it.  She is currently on Emgality has Ajovy once not covered by insurance.  She reports that she had severe migraine lasting for about a week in April.  I had prescribed her Medrol Dosepak to break the migraine.  I have advised her to take Emgality every 25th day for better efficacy and I will send in the prescription of Qulipta for her to restart.  Otherwise, I will see her back in 12 weeks for next Botox injection.

## 2023-06-10 DIAGNOSIS — G43.719 INTRACTABLE CHRONIC MIGRAINE WITHOUT AURA AND WITHOUT STATUS MIGRAINOSUS: ICD-10-CM

## 2023-06-12 RX ORDER — GABAPENTIN 600 MG/1
TABLET ORAL
Qty: 150 TABLET | Refills: 3 | Status: SHIPPED | OUTPATIENT
Start: 2023-06-12

## 2023-06-12 NOTE — TELEPHONE ENCOUNTER
Rx Refill Note  Requested Prescriptions     Pending Prescriptions Disp Refills    gabapentin (NEURONTIN) 600 MG tablet [Pharmacy Med Name: GABAPENTIN 600 MG TABLET] 150 tablet      Sig: TAKE TWO TABLETS BY MOUTH EVERY MORNING AND ONE TABLET AT NOON AND TAKE TWO TABLETS BY MOUTH EVERY NIGHT      Last filled:1/16/23 with 3 refills. Pending to provider  Last office visit with prescribing clinician: Visit date not found      Next office visit with prescribing clinician: 9/15/2023     Bassem Lassiter MA  06/12/23, 09:06 EDT

## 2023-08-14 RX ORDER — BUPROPION HYDROCHLORIDE 300 MG/1
TABLET ORAL
Qty: 90 TABLET | Refills: 0 | Status: SHIPPED | OUTPATIENT
Start: 2023-08-14

## 2023-08-28 ENCOUNTER — OFFICE VISIT (OUTPATIENT)
Dept: FAMILY MEDICINE CLINIC | Facility: CLINIC | Age: 48
End: 2023-08-28
Payer: COMMERCIAL

## 2023-08-28 VITALS
SYSTOLIC BLOOD PRESSURE: 122 MMHG | HEIGHT: 60 IN | BODY MASS INDEX: 32.98 KG/M2 | OXYGEN SATURATION: 98 % | HEART RATE: 89 BPM | DIASTOLIC BLOOD PRESSURE: 80 MMHG | WEIGHT: 168 LBS

## 2023-08-28 DIAGNOSIS — E66.09 CLASS 1 OBESITY DUE TO EXCESS CALORIES WITHOUT SERIOUS COMORBIDITY WITH BODY MASS INDEX (BMI) OF 30.0 TO 30.9 IN ADULT: Primary | ICD-10-CM

## 2023-08-28 PROCEDURE — 99212 OFFICE O/P EST SF 10 MIN: CPT | Performed by: PHYSICIAN ASSISTANT

## 2023-08-28 RX ORDER — VENLAFAXINE HYDROCHLORIDE 150 MG/1
150 CAPSULE, EXTENDED RELEASE ORAL DAILY
COMMUNITY
Start: 2023-06-14 | End: 2023-08-28

## 2023-08-28 RX ORDER — CYCLOBENZAPRINE HCL 10 MG
10 TABLET ORAL 3 TIMES DAILY PRN
COMMUNITY

## 2023-08-28 NOTE — PROGRESS NOTES
"Answers submitted by the patient for this visit:  Other (Submitted on 8/21/2023)  Please describe your symptoms.: Refills  Have you had these symptoms before?: Yes  How long have you been having these symptoms?: Greater than 2 weeks  Please list any medications you are currently taking for this condition.: Na  Please describe any probable cause for these symptoms. : Na  Primary Reason for Visit (Submitted on 8/21/2023)  What is the primary reason for your visit?: Other  Chief Complaint  Discuss Meds    Subjective          Molly GARCIA Ferguson presents to [facility]  History of Present Illness  patient is here to follow up on the Wegovey, she says that since there is a shortage she has been unable to get it, but when she was taking it she had lost weight.  She is going to try again when the supply is improved.  She has no other complaints today.  She says the change in her mental health medications has been fine, she is not having any issues with that.      Objective   Vital Signs:   /80 (BP Location: Right arm)   Pulse 89   Ht 152.4 cm (60\")   Wt 76.2 kg (168 lb)   SpO2 98%   BMI 32.81 kg/mý     Body mass index is 32.81 kg/mý.    Review of Systems   Constitutional:  Negative for chills, fatigue and fever.   Respiratory:  Negative for cough and shortness of breath.    Cardiovascular:  Negative for chest pain and palpitations.   Musculoskeletal:  Negative for back pain and myalgias.   Neurological:  Negative for dizziness and headache.   Psychiatric/Behavioral:  Negative for depressed mood. The patient is not nervous/anxious.      Past History:  Medical History: has a past medical history of Depression, Depressive disorder, History of anxiety, History of hyperlipidemia, Migraines, Patellar malalignment syndrome of left knee, PONV (postoperative nausea and vomiting), Tennis elbow, Trigger thumb, and Visual disturbances.   Surgical History: has a past surgical history that includes " Tubal ligation; Hysterectomy; Shoulder surgery (Left, 2020);  section; Colonoscopy; Esophagogastroduodenoscopy; Tennis Elbow Release (Left, 11/10/2020); and Essure tubal ligation.   Family History: family history includes Alzheimer's disease in some other family members; Breast cancer in her maternal grandmother; Cancer in an other family member; Dementia in her maternal grandmother; Diabetes in her father; Heart disease in her father and another family member; Hyperlipidemia in her father; Hypertension in her father and maternal grandmother; Parkinsonism in her paternal grandfather and another family member; Skin cancer in her mother.   Social History: reports that she has been smoking cigarettes. She has a 2.50 pack-year smoking history. She has never used smokeless tobacco. She reports current alcohol use. She reports that she does not use drugs.      Current Outpatient Medications:     buPROPion XL (WELLBUTRIN XL) 300 MG 24 hr tablet, TAKE ONE TABLET BY MOUTH DAILY, Disp: 90 tablet, Rfl: 0    cyclobenzaprine (FLEXERIL) 10 MG tablet, Take 1 tablet by mouth 3 (Three) Times a Day As Needed., Disp: , Rfl:     Emgality 120 MG/ML auto-injector pen, INJECT 1ML UNDER THE SKIN INTO THE APPROPRIATE AREA AS DIRECTED FOR ONE DOSE EVERY 30 DAYS, Disp: 1 mL, Rfl: 11    gabapentin (NEURONTIN) 600 MG tablet, TAKE TWO TABLETS BY MOUTH EVERY MORNING AND ONE TABLET AT NOON AND TAKE TWO TABLETS BY MOUTH EVERY NIGHT, Disp: 150 tablet, Rfl: 3    hydrOXYzine pamoate (VISTARIL) 50 MG capsule, TAKE ONE CAPSULE BY MOUTH EVERY 8 HOURS AS NEEDED FOR ANXIETY, Disp: 90 capsule, Rfl: 1    OnabotulinumtoxinA 200 units reconstituted solution, Inject 200 units IM into head neck shoulders every 12 weeks per FDA protocol Dx G43.719 SHIP TO [facility]DRITA HOLDEN., Disp: 1 each, Rfl: 3    pantoprazole (PROTONIX) 40 MG EC tablet, TAKE ONE TABLET BY MOUTH DAILY, Disp: 90 tablet, Rfl: 2    Semaglutide-Weight Management 0.5 MG/0.5ML solution  auto-injector, Inject 0.5 mL under the skin into the appropriate area as directed 1 (One) Time Per Week. (Patient not taking: Reported on 8/28/2023), Disp: 2 mL, Rfl: 1    Semaglutide-Weight Management 1 MG/0.5ML solution auto-injector, Inject 0.5 mL under the skin into the appropriate area as directed 1 (One) Time Per Week. (Patient not taking: Reported on 8/28/2023), Disp: 2 mL, Rfl: 1    SUMAtriptan (IMITREX) 100 MG tablet, TAKE ONE TABLET BY MOUTH AT ONSET OF HEADACHE; MAY REPEAT ONE TABLET IN 2 HOURS IF NEEDED. MAX OF 2 TABLETS PER DAY, Disp: 9 tablet, Rfl: 3    SUMAtriptan Succinate (IMITREX) 6 MG/0.5ML injection, INJECT PRESCRIBED DOES AT ONSET OF HEADACHE. MAY REPEAT DOSE ONE TIME IN 1 HOUR IF HEADACHE NOT RELIEVED., Disp: 1 mL, Rfl: 6    traZODone (DESYREL) 100 MG tablet, TAKE ONE TABLET BY MOUTH ONCE NIGHTLY, Disp: 90 tablet, Rfl: 1    Trokendi  MG capsule sustained-release 24 hr, TAKE ONE CAPSULE BY MOUTH DAILY, Disp: 90 capsule, Rfl: 2    ubrogepant (UBRELVY) 50 MG tablet, Take 2 tablets by mouth As Needed (As needed) for up to 1 dose., Disp: 10 tablet, Rfl: 3    vilazodone (VIIBRYD) 10 MG tablet tablet, , Disp: , Rfl:     Allergies: Codeine, Nickel, Penicillins, Morphine, and Morphine and related    Physical Exam  Constitutional:       Appearance: Normal appearance.   Neurological:      Mental Status: She is alert and oriented to person, place, and time.   Psychiatric:         Mood and Affect: Mood normal.         Behavior: Behavior normal.        Result Review :                   Assessment and Plan    Diagnoses and all orders for this visit:    1. Class 1 obesity due to excess calories without serious comorbidity with body mass index (BMI) of 30.0 to 30.9 in adult (Primary)  Assessment & Plan:  Patient's (Body mass index is 32.81 kg/mý.) indicates that they are obese (BMI >30) with health conditions that include none . Weight is unchanged. BMI  is above average; BMI management plan is completed.  We discussed low calorie, low carb based diet program, portion control, increasing exercise, and pharmacologic options including Wegovy when it becomes more readily available .  She will follow up once she has been back on it for 4-6 weeks.           Follow Up   Return if symptoms worsen or fail to improve.  Patient was given instructions and counseling regarding her condition or for health maintenance advice. Please see specific information pulled into the AVS if appropriate.     Andreia Vargas PA-C

## 2023-08-28 NOTE — ASSESSMENT & PLAN NOTE
Patient's (Body mass index is 32.81 kg/mý.) indicates that they are obese (BMI >30) with health conditions that include none . Weight is unchanged. BMI  is above average; BMI management plan is completed. We discussed low calorie, low carb based diet program, portion control, increasing exercise, and pharmacologic options including Wegovy when it becomes more readily available .  She will follow up once she has been back on it for 4-6 weeks.

## 2023-09-06 DIAGNOSIS — G43.719 INTRACTABLE CHRONIC MIGRAINE WITHOUT AURA AND WITHOUT STATUS MIGRAINOSUS: ICD-10-CM

## 2023-09-07 RX ORDER — SUMATRIPTAN 100 MG/1
TABLET, FILM COATED ORAL
Qty: 9 TABLET | Refills: 0 | Status: SHIPPED | OUTPATIENT
Start: 2023-09-07

## 2023-09-07 NOTE — TELEPHONE ENCOUNTER
Rx Refill Note  Requested Prescriptions     Pending Prescriptions Disp Refills    SUMAtriptan (IMITREX) 100 MG tablet [Pharmacy Med Name: SUMAtriptan SUCC 100 MG TABLET] 9 tablet 3     Sig: TAKE ONE TABLET BY MOUTH AT ONSET OF HEADACHE; MAY REPEAT ONE TABLET IN 2 HOURS IF NEEDED. MAX OF 200MG A DAY      Last filled: 2/22/23 #9 with 3 RF   Last office visit with prescribing clinician: 6/8/23 botox appt, 9/11/20 office visit   Next office visit with prescribing clinician: 9/15/2023     Luis Jesus MA  09/07/23, 09:41 EDT

## 2023-09-15 ENCOUNTER — PROCEDURE VISIT (OUTPATIENT)
Dept: NEUROLOGY | Facility: CLINIC | Age: 48
End: 2023-09-15
Payer: COMMERCIAL

## 2023-09-15 DIAGNOSIS — G43.719 INTRACTABLE CHRONIC MIGRAINE WITHOUT AURA AND WITHOUT STATUS MIGRAINOSUS: Primary | ICD-10-CM

## 2023-09-15 NOTE — PROGRESS NOTES
CC:  Migraines.    HPI : Patient is in clinic for Botox injection.  Current headache frequency is approximately 6-8 headache days in a month with some headaches lasting for more than 4 hours.      Botox Procedure Note    Current headache frequency: 6-8 headaches days / month.    The risks and benefits were discussed with the patient. The patient was given the opportunity to ask questions. Informed consent form was signed.    Onabotulinumtoxin A was reconstituted with 0.9% normal saline. All injections sites were prepped with alcohol swab. Approximately 5 units of botox were injected into each of the following sites bilaterally as per routine migraine botox injection PREEMPT protocol: , procerus, frontalis, temporalis, occipitalis, upper cervical paraspinals, and trapezius.    The patient tolerated the procedure well. There were no immediate complications. The patient will follow up in approximately 12 weeks for her next injection.    Total amount of onabotulinumtoxin A injected was 155 units with 45 units wasted.    Note: Insurance did not approve Qulipta so now she is taking Qulipta 60 mg daily along with Emgality once a month injection.  She is infrequently having intractable migraine lasting for a week or so.  I am going to prescribe her Zavzpret nasal spray to try and see if it helps if Imitrex does not help in breaking the migraine.  Otherwise, I will see her back in 12 weeks for next injection.

## 2023-09-18 ENCOUNTER — TELEPHONE (OUTPATIENT)
Dept: NEUROLOGY | Facility: CLINIC | Age: 48
End: 2023-09-18
Payer: COMMERCIAL

## 2023-09-18 NOTE — TELEPHONE ENCOUNTER
Provider: DR BARKLEY    Caller: GALE WITH [facility]    Phone Number: [phone]    Reason for Call: CALLED TO GET AN UPDATE ON THE AUTH FOR ZAVZPRET. PLEASE ADVISE, THANK YOU.

## 2023-09-19 NOTE — TELEPHONE ENCOUNTER
Returned call, spoke with rep Calle. PA restarted, there was an error in system. Will resubmit shortly      Luis JOHNSON

## 2023-09-26 ENCOUNTER — TELEPHONE (OUTPATIENT)
Dept: NEUROLOGY | Facility: CLINIC | Age: 48
End: 2023-09-26
Payer: COMMERCIAL

## 2023-09-26 NOTE — TELEPHONE ENCOUNTER
Provider: RUBIA    Caller: CHRIS WITH [facility]     Phone Number: [phone]    Reason for Call: CHRIS WITH [facility] CALLED AND WAS CHECKING STATUS OF PRIOR AUTH FOR ZAVZPRET. CHRIS STATES THAT INSURANCE DOES NOT HAVE A PA ON FILE. CHRIS ASKED FOR A RETURN ON STATUS.    PLEASE REVIEW AND ADVISE.  THANK YOU

## 2023-09-27 ENCOUNTER — OFFICE VISIT (OUTPATIENT)
Dept: FAMILY MEDICINE CLINIC | Facility: CLINIC | Age: 48
End: 2023-09-27
Payer: COMMERCIAL

## 2023-09-27 VITALS
HEIGHT: 60 IN | DIASTOLIC BLOOD PRESSURE: 76 MMHG | HEART RATE: 90 BPM | OXYGEN SATURATION: 98 % | WEIGHT: 161.2 LBS | BODY MASS INDEX: 31.65 KG/M2 | SYSTOLIC BLOOD PRESSURE: 122 MMHG

## 2023-09-27 DIAGNOSIS — G89.29 CHRONIC MIDLINE LOW BACK PAIN WITHOUT SCIATICA: Primary | ICD-10-CM

## 2023-09-27 DIAGNOSIS — M54.50 CHRONIC MIDLINE LOW BACK PAIN WITHOUT SCIATICA: Primary | ICD-10-CM

## 2023-09-27 DIAGNOSIS — M54.2 NECK PAIN ON RIGHT SIDE: ICD-10-CM

## 2023-09-27 PROCEDURE — 99214 OFFICE O/P EST MOD 30 MIN: CPT | Performed by: PHYSICIAN ASSISTANT

## 2023-09-27 RX ORDER — VILAZODONE HYDROCHLORIDE 20 MG/1
TABLET ORAL
COMMUNITY
Start: 2023-09-19

## 2023-09-27 RX ORDER — GALCANEZUMAB 120 MG/ML
INJECTION, SOLUTION SUBCUTANEOUS
COMMUNITY
Start: 2023-09-05

## 2023-09-27 RX ORDER — BUSPIRONE HYDROCHLORIDE 10 MG/1
TABLET ORAL
COMMUNITY
Start: 2023-09-19

## 2023-09-27 RX ORDER — METHYLPREDNISOLONE 4 MG/1
TABLET ORAL
Qty: 21 TABLET | Refills: 0 | Status: SHIPPED | OUTPATIENT
Start: 2023-09-27

## 2023-09-27 RX ORDER — NABUMETONE 750 MG/1
750 TABLET, FILM COATED ORAL 2 TIMES DAILY
Qty: 60 TABLET | Refills: 1 | Status: SHIPPED | OUTPATIENT
Start: 2023-09-27

## 2023-09-27 NOTE — ASSESSMENT & PLAN NOTE
am going to get an xray, of her neck, gave her a round of steroids and put her on Nabumetone in addition to her Flexeril.  I am also setting her up for physical therapy.  I will see her back in 6 weeks and if not improved we will consider further imaging.

## 2023-09-27 NOTE — PROGRESS NOTES
"Chief Complaint  Back Pain (X2 months)    Subjective          oMlly Ferguson presents to [facility]  History of Present Illness Patient is being seen today for neck and low back pain.  This has been going on for months and she has been getting massage and seeing a chiropractor, but it is not improving and her chiropractor suggested she see us for xrays and treatment.  She states the back pain limits her mobility, preventing her from bending or stooping especially in the early morning.  She says sometimes the pain radiates to her hip and makes walking uncomfortable, and the neck pain seems to exacerbate her headaches.     Objective   Vital Signs:   /76 (BP Location: Right arm)   Pulse 90   Ht 152.4 cm (60\")   Wt 73.1 kg (161 lb 3.2 oz)   SpO2 98%   BMI 31.48 kg/m²     Body mass index is 31.48 kg/m².    Review of Systems   Constitutional:  Negative for fever and unexpected weight loss.   Cardiovascular:  Negative for chest pain.   Gastrointestinal:  Negative for abdominal pain.   Genitourinary:  Negative for dysuria, pelvic pain and urinary incontinence.   Musculoskeletal:  Positive for back pain and neck pain.   Neurological:  Negative for weakness and numbness.     Past History:  Medical History: has a past medical history of Depression, Depressive disorder, History of anxiety, History of hyperlipidemia, Migraines, Patellar malalignment syndrome of left knee, PONV (postoperative nausea and vomiting), Tennis elbow, Trigger thumb, and Visual disturbances.   Surgical History: has a past surgical history that includes Tubal ligation; Hysterectomy; Shoulder surgery (Left, 2020);  section; Colonoscopy; Esophagogastroduodenoscopy; Tennis Elbow Release (Left, 11/10/2020); and Essure tubal ligation.   Family History: family history includes Alzheimer's disease in some other family members; Breast cancer in her maternal grandmother; Cancer in an other family member; " Dementia in her maternal grandmother; Diabetes in her father; Heart disease in her father and another family member; Hyperlipidemia in her father; Hypertension in her father and maternal grandmother; Parkinsonism in her paternal grandfather and another family member; Skin cancer in her mother.   Social History: reports that she has been smoking cigarettes. She has a 2.50 pack-year smoking history. She has never used smokeless tobacco. She reports current alcohol use. She reports that she does not use drugs.      Current Outpatient Medications:     buPROPion XL (WELLBUTRIN XL) 300 MG 24 hr tablet, TAKE ONE TABLET BY MOUTH DAILY, Disp: 90 tablet, Rfl: 0    busPIRone (BUSPAR) 10 MG tablet, , Disp: , Rfl:     cyclobenzaprine (FLEXERIL) 10 MG tablet, Take 1 tablet by mouth 3 (Three) Times a Day As Needed., Disp: , Rfl:     Emgality 120 MG/ML solution prefilled syringe, , Disp: , Rfl:     gabapentin (NEURONTIN) 600 MG tablet, TAKE TWO TABLETS BY MOUTH EVERY MORNING AND ONE TABLET AT NOON AND TAKE TWO TABLETS BY MOUTH EVERY NIGHT, Disp: 150 tablet, Rfl: 3    hydrOXYzine pamoate (VISTARIL) 50 MG capsule, TAKE ONE CAPSULE BY MOUTH EVERY 8 HOURS AS NEEDED FOR ANXIETY, Disp: 90 capsule, Rfl: 1    OnabotulinumtoxinA 200 units reconstituted solution, Inject 200 units IM into head neck shoulders every 12 weeks per FDA protocol Dx G43.719 SHIP TO [facility]., Disp: 1 each, Rfl: 3    pantoprazole (PROTONIX) 40 MG EC tablet, TAKE ONE TABLET BY MOUTH DAILY, Disp: 90 tablet, Rfl: 2    Semaglutide-Weight Management 0.25 MG/0.5ML solution auto-injector, Inject 0.25 mg under the skin into the appropriate area as directed 1 (One) Time Per Week., Disp: 2 mL, Rfl: 0    Semaglutide-Weight Management 0.5 MG/0.5ML solution auto-injector, Inject 0.5 mL under the skin into the appropriate area as directed 1 (One) Time Per Week., Disp: 2 mL, Rfl: 1    Semaglutide-Weight Management 1 MG/0.5ML solution auto-injector, Inject 0.5 mL under the  skin into the appropriate area as directed 1 (One) Time Per Week., Disp: 2 mL, Rfl: 1    SUMAtriptan (IMITREX) 100 MG tablet, TAKE ONE TABLET BY MOUTH AT ONSET OF HEADACHE; MAY REPEAT ONE TABLET IN 2 HOURS IF NEEDED. MAX OF 200MG A DAY, Disp: 9 tablet, Rfl: 0    SUMAtriptan Succinate (IMITREX) 6 MG/0.5ML injection, INJECT PRESCRIBED DOES AT ONSET OF HEADACHE. MAY REPEAT DOSE ONE TIME IN 1 HOUR IF HEADACHE NOT RELIEVED., Disp: 1 mL, Rfl: 6    traZODone (DESYREL) 100 MG tablet, TAKE ONE TABLET BY MOUTH ONCE NIGHTLY, Disp: 90 tablet, Rfl: 1    Trokendi  MG capsule sustained-release 24 hr, TAKE ONE CAPSULE BY MOUTH DAILY, Disp: 90 capsule, Rfl: 2    ubrogepant (UBRELVY) 50 MG tablet, Take 2 tablets by mouth As Needed (As needed) for up to 1 dose., Disp: 10 tablet, Rfl: 3    vilazodone (VIIBRYD) 10 MG tablet tablet, , Disp: , Rfl:     vilazodone (VIIBRYD) 20 MG tablet tablet, , Disp: , Rfl:     Zavegepant HCl 10 MG/ACT solution, 10 mg into the nostril(s) as directed by provider As Needed (Migraine) for up to 30 days., Disp: 6 each, Rfl: 1    methylPREDNISolone (MEDROL) 4 MG dose pack, Take as directed on package instructions., Disp: 21 tablet, Rfl: 0    nabumetone (Relafen) 750 MG tablet, Take 1 tablet by mouth 2 (Two) Times a Day., Disp: 60 tablet, Rfl: 1    Current Facility-Administered Medications:     OnabotulinumtoxinA 200 Units, 200 Units, Intramuscular, Q3 Months, Darrell Del Valle MD, 200 Units at 09/15/23 1145    Allergies: Codeine, Nickel, Penicillins, Morphine, and Morphine and related    Physical Exam  HENT:      Head: Normocephalic and atraumatic.   Musculoskeletal:      Cervical back: Spasms and tenderness present. Pain with movement present.      Lumbar back: Spasms and tenderness present. Negative right straight leg raise test and negative left straight leg raise test.   Neurological:      Mental Status: She is alert and oriented to person, place, and time.   Psychiatric:         Behavior: Behavior  normal.        Result Review :                   Assessment and Plan    Diagnoses and all orders for this visit:    1. Chronic midline low back pain without sciatica (Primary)  Assessment & Plan:  I am going to get an xray, of her lower back, gave her a round of steroids and put her on Nabumetone in addition to her Flexeril.  I am also setting her up for physical therapy.  I will see her back in 6 weeks and if not improved we will consider further imaging.      Orders:  -     XR Spine Lumbar 2 or 3 View; Future  -     Ambulatory Referral to Physical Therapy Evaluate and treat    2. Neck pain on right side  Assessment & Plan:   am going to get an xray, of her neck, gave her a round of steroids and put her on Nabumetone in addition to her Flexeril.  I am also setting her up for physical therapy.  I will see her back in 6 weeks and if not improved we will consider further imaging.      Orders:  -     XR Spine Cervical 2 or 3 View; Future  -     Ambulatory Referral to Physical Therapy Evaluate and treat    Other orders  -     methylPREDNISolone (MEDROL) 4 MG dose pack; Take as directed on package instructions.  Dispense: 21 tablet; Refill: 0  -     nabumetone (Relafen) 750 MG tablet; Take 1 tablet by mouth 2 (Two) Times a Day.  Dispense: 60 tablet; Refill: 1        Follow Up   Return in about 6 weeks (around 11/8/2023) for Recheck.  Patient was given instructions and counseling regarding her condition or for health maintenance advice. Please see specific information pulled into the AVS if appropriate.     Andreia Vargas PA-C

## 2023-09-27 NOTE — ASSESSMENT & PLAN NOTE
I am going to get an xray, of her lower back, gave her a round of steroids and put her on Nabumetone in addition to her Flexeril.  I am also setting her up for physical therapy.  I will see her back in 6 weeks and if not improved we will consider further imaging.

## 2023-09-29 ENCOUNTER — TELEPHONE (OUTPATIENT)
Dept: NEUROLOGY | Facility: CLINIC | Age: 48
End: 2023-09-29
Payer: COMMERCIAL

## 2023-09-29 DIAGNOSIS — G43.719 INTRACTABLE CHRONIC MIGRAINE WITHOUT AURA AND WITHOUT STATUS MIGRAINOSUS: ICD-10-CM

## 2023-10-02 RX ORDER — SUMATRIPTAN 100 MG/1
TABLET, FILM COATED ORAL
Qty: 9 TABLET | Refills: 0 | Status: SHIPPED | OUTPATIENT
Start: 2023-10-02

## 2023-10-02 NOTE — TELEPHONE ENCOUNTER
Rx Refill Note  Requested Prescriptions     Pending Prescriptions Disp Refills    SUMAtriptan (IMITREX) 100 MG tablet [Pharmacy Med Name: SUMAtriptan SUCC 100 MG TABLET] 9 tablet 0     Sig: TAKE 1 TABLET BY MOUTH AT ONSET OF HEADACHE; MAY REPEAT 1 TABLET IN 2 HOURS IF NEEDED. MAX 2 TABLETS DAILY      Last filled:9/7/23 with 0 RF sent in  Last office visit with prescribing clinician: 9/15/23  Next office visit with prescribing clinician: 12/13/2023     Reshma Dalal MA  10/02/23, 08:39 EDT

## 2023-10-02 NOTE — TELEPHONE ENCOUNTER
Denied on September 29  Your PA request has been denied. Additional information will be provided in the denial communication.

## 2023-10-03 RX ORDER — PANTOPRAZOLE SODIUM 40 MG/1
40 TABLET, DELAYED RELEASE ORAL DAILY
Qty: 90 TABLET | Refills: 2 | Status: SHIPPED | OUTPATIENT
Start: 2023-10-03

## 2023-10-04 DIAGNOSIS — G43.719 INTRACTABLE CHRONIC MIGRAINE WITHOUT AURA AND WITHOUT STATUS MIGRAINOSUS: ICD-10-CM

## 2023-10-04 RX ORDER — GABAPENTIN 600 MG/1
TABLET ORAL
Qty: 150 TABLET | Refills: 0 | Status: SHIPPED | OUTPATIENT
Start: 2023-10-04

## 2023-10-04 NOTE — TELEPHONE ENCOUNTER
Rx Refill Note  Requested Prescriptions     Pending Prescriptions Disp Refills    gabapentin (NEURONTIN) 600 MG tablet [Pharmacy Med Name: GABAPENTIN 600 MG TABLET] 150 tablet      Sig: TAKE 2 TABLETS BY MOUTH EVERY MORNING, ONE TABLET BY MOUTH AT NOON AND TAKE TWO TABLETS BY MOUTH ONCE NIGHTLY      Last filled:6/12/2023 WITH 3 REFILLS  Last office visit with prescribing clinician: Visit date not found      Next office visit with prescribing clinician: 12/13/2023     PATITO MONTAGUE  10/04/23, 15:35 EDT  \  PEND TO PROVIDER

## 2023-10-05 RX ORDER — HYDROXYZINE PAMOATE 50 MG/1
CAPSULE ORAL
Qty: 90 CAPSULE | Refills: 1 | Status: SHIPPED | OUTPATIENT
Start: 2023-10-05

## 2023-11-17 DIAGNOSIS — G43.719 INTRACTABLE CHRONIC MIGRAINE WITHOUT AURA AND WITHOUT STATUS MIGRAINOSUS: ICD-10-CM

## 2023-11-17 RX ORDER — SUMATRIPTAN 100 MG/1
TABLET, FILM COATED ORAL
Qty: 9 TABLET | Refills: 0 | Status: SHIPPED | OUTPATIENT
Start: 2023-11-17

## 2023-11-17 RX ORDER — BUPROPION HYDROCHLORIDE 300 MG/1
300 TABLET ORAL DAILY
Qty: 90 TABLET | Refills: 0 | Status: SHIPPED | OUTPATIENT
Start: 2023-11-17

## 2023-11-17 NOTE — TELEPHONE ENCOUNTER
Rx Refill Note  Requested Prescriptions     Pending Prescriptions Disp Refills    SUMAtriptan (IMITREX) 100 MG tablet [Pharmacy Med Name: SUMATRIPTAN SUCC 100 MG TABLET] 9 tablet 0     Sig: TAKE 1 TABLET BY MOUTH AT ONSET OF HEADACHE; MAY REPEAT 1 TABLET IN 2 HOURS IF NEEDED. MAX 2 TABLETS DAILY      Last filled: 10/2/23 #9 with 0 refills   Last office visit with prescribing clinician: 9/15/23 procedure visit   Next office visit with prescribing clinician: 12/13/2023     Luis Jesus MA  11/17/23, 12:03 EST

## 2023-11-29 ENCOUNTER — OFFICE VISIT (OUTPATIENT)
Dept: FAMILY MEDICINE CLINIC | Facility: CLINIC | Age: 48
End: 2023-11-29
Payer: COMMERCIAL

## 2023-11-29 VITALS
HEART RATE: 96 BPM | OXYGEN SATURATION: 99 % | DIASTOLIC BLOOD PRESSURE: 80 MMHG | WEIGHT: 156 LBS | TEMPERATURE: 98.2 F | HEIGHT: 60 IN | BODY MASS INDEX: 30.63 KG/M2 | SYSTOLIC BLOOD PRESSURE: 132 MMHG

## 2023-11-29 DIAGNOSIS — E66.09 CLASS 1 OBESITY DUE TO EXCESS CALORIES WITHOUT SERIOUS COMORBIDITY WITH BODY MASS INDEX (BMI) OF 30.0 TO 30.9 IN ADULT: ICD-10-CM

## 2023-11-29 DIAGNOSIS — M54.42 CHRONIC MIDLINE LOW BACK PAIN WITH BILATERAL SCIATICA: Primary | ICD-10-CM

## 2023-11-29 DIAGNOSIS — G89.29 CHRONIC MIDLINE LOW BACK PAIN WITH BILATERAL SCIATICA: Primary | ICD-10-CM

## 2023-11-29 DIAGNOSIS — M54.41 CHRONIC MIDLINE LOW BACK PAIN WITH BILATERAL SCIATICA: Primary | ICD-10-CM

## 2023-11-29 PROCEDURE — 99214 OFFICE O/P EST MOD 30 MIN: CPT | Performed by: PHYSICIAN ASSISTANT

## 2023-11-29 RX ORDER — SEMAGLUTIDE 1 MG/.5ML
INJECTION, SOLUTION SUBCUTANEOUS
Qty: 2 ML | Refills: 3 | Status: SHIPPED | OUTPATIENT
Start: 2023-11-29

## 2023-11-29 RX ORDER — SACCHAROMYCES BOULARDII 250 MG
250 CAPSULE ORAL DAILY
COMMUNITY

## 2023-11-29 RX ORDER — PSEUDOEPHEDRINE HCL 30 MG
TABLET ORAL AS NEEDED
COMMUNITY

## 2023-11-30 NOTE — PROGRESS NOTES
"Answers submitted by the patient for this visit:  Other (Submitted on 11/22/2023)  Please describe your symptoms.: FU for Wegovy and back pain.  Have you had these symptoms before?: Yes  How long have you been having these symptoms?: Greater than 2 weeks  Primary Reason for Visit (Submitted on 11/22/2023)  What is the primary reason for your visit?: Other  Chief Complaint  Weight Loss (Follow up) and Back Pain (The pain isn't much better)    Subjective          Molly Ferguson presents to [facility]    History of Present Illness patient is being seen today for a follow-up after having started Wegovy.  She states that she has had  success in weight loss and she will continue the medication.  She also continues to have the back pain and the conservative treatment that we have tried thus far has been ineffective and she would like to proceed with the MRI and possibly seeing neurosurgeon if that is indicated.      Objective   Vital Signs:   /80 (BP Location: Left arm, Patient Position: Sitting, Cuff Size: Adult)   Pulse 96   Temp 98.2 °F (36.8 °C) (Infrared)   Ht 152.4 cm (60\")   Wt 70.8 kg (156 lb)   SpO2 99%   BMI 30.47 kg/m²     Body mass index is 30.47 kg/m².    Review of Systems   Constitutional:  Negative for chills, fatigue and fever.   Respiratory:  Negative for cough, shortness of breath and wheezing.    Cardiovascular:  Negative for chest pain and palpitations.   Musculoskeletal:  Positive for back pain. Negative for myalgias.   Neurological: Negative.  Negative for dizziness and headache.   Psychiatric/Behavioral: Negative.  Negative for depressed mood. The patient is not nervous/anxious.        Past History:  Medical History: has a past medical history of Anxiety, Depression, Depressive disorder, GERD (gastroesophageal reflux disease), History of anxiety, History of hyperlipidemia, Low back pain, Migraines, Obesity, Patellar malalignment syndrome of left knee, PONV " (postoperative nausea and vomiting), Tennis elbow, Trigger thumb, and Visual disturbances.   Surgical History: has a past surgical history that includes Tubal ligation; Hysterectomy; Shoulder surgery (Left, 2020);  section; Colonoscopy; Esophagogastroduodenoscopy; Tennis Elbow Release (Left, 11/10/2020); Essure tubal ligation; and Joint replacement (2023).   Family History: family history includes Alzheimer's disease in some other family members; Arthritis in her father; Breast cancer in her maternal grandmother; COPD in her father; Cancer in an other family member; Dementia in her maternal grandmother; Diabetes in her father; Heart disease in her father and another family member; Hyperlipidemia in her father; Hypertension in her father and maternal grandmother; Parkinsonism in her paternal grandfather and another family member; Skin cancer in her mother.   Social History: reports that she quit smoking about 7 months ago. Her smoking use included cigarettes. She has a 5.00 pack-year smoking history. She has never used smokeless tobacco. She reports that she does not currently use alcohol. She reports that she does not use drugs.      Current Outpatient Medications:     buPROPion XL (WELLBUTRIN XL) 300 MG 24 hr tablet, TAKE 1 TABLET BY MOUTH DAILY, Disp: 90 tablet, Rfl: 0    busPIRone (BUSPAR) 10 MG tablet, Take 1 tablet by mouth Daily., Disp: , Rfl:     cyclobenzaprine (FLEXERIL) 10 MG tablet, Take 1 tablet by mouth 2 (Two) Times a Day., Disp: , Rfl:     docusate sodium 250 MG capsule, As Needed., Disp: , Rfl:     Emgality 120 MG/ML solution prefilled syringe, Every 30 (Thirty) Days., Disp: , Rfl:     gabapentin (NEURONTIN) 600 MG tablet, TAKE 2 TABLETS BY MOUTH EVERY MORNING, ONE TABLET BY MOUTH AT NOON AND TAKE TWO TABLETS BY MOUTH ONCE NIGHTLY, Disp: 150 tablet, Rfl: 0    hydrOXYzine pamoate (VISTARIL) 50 MG capsule, TAKE ONE CAPSULE BY MOUTH EVERY 8 HOURS AS NEEDED FOR ANXIETY (Patient taking  differently: Take 1 capsule by mouth Daily.), Disp: 90 capsule, Rfl: 1    nabumetone (Relafen) 750 MG tablet, Take 1 tablet by mouth 2 (Two) Times a Day., Disp: 60 tablet, Rfl: 1    OnabotulinumtoxinA 200 units reconstituted solution, Inject 200 units IM into head neck shoulders every 12 weeks per FDA protocol Dx G43.719 SHIP TO [facility]. (Patient taking differently: Every 3 (Three) Months. Inject 200 units IM into head neck shoulders every 12 weeks per FDA protocol Dx G43.719 SHIP TO [facility].), Disp: 1 each, Rfl: 3    pantoprazole (PROTONIX) 40 MG EC tablet, Take 1 tablet by mouth Daily., Disp: 90 tablet, Rfl: 2    saccharomyces boulardii (FLORASTOR) 250 MG capsule, Take 1 capsule by mouth Daily., Disp: , Rfl:     SUMAtriptan (IMITREX) 100 MG tablet, TAKE 1 TABLET BY MOUTH AT ONSET OF HEADACHE; MAY REPEAT 1 TABLET IN 2 HOURS IF NEEDED. MAX 2 TABLETS DAILY, Disp: 9 tablet, Rfl: 0    SUMAtriptan Succinate (IMITREX) 6 MG/0.5ML injection, INJECT PRESCRIBED DOES AT ONSET OF HEADACHE. MAY REPEAT DOSE ONE TIME IN 1 HOUR IF HEADACHE NOT RELIEVED., Disp: 1 mL, Rfl: 6    traZODone (DESYREL) 100 MG tablet, TAKE ONE TABLET BY MOUTH ONCE NIGHTLY, Disp: 90 tablet, Rfl: 1    Trokendi  MG capsule sustained-release 24 hr, TAKE ONE CAPSULE BY MOUTH DAILY, Disp: 90 capsule, Rfl: 2    ubrogepant (UBRELVY) 50 MG tablet, Take 2 tablets by mouth As Needed (As needed) for up to 1 dose., Disp: 10 tablet, Rfl: 3    vilazodone (VIIBRYD) 20 MG tablet tablet, , Disp: , Rfl:     Semaglutide-Weight Management (Wegovy) 1 MG/0.5ML solution auto-injector, INJECT 1 MG UNDER THE SKIN ONCE WEEKLY, Disp: 2 mL, Rfl: 3    Current Facility-Administered Medications:     OnabotulinumtoxinA 200 Units, 200 Units, Intramuscular, Q3 Months, Darrell Del Valle MD, 200 Units at 09/15/23 1145    Allergies: Codeine, Nickel, Penicillins, Morphine, and Morphine and related    Physical Exam  Vitals reviewed.   Constitutional:       Appearance: She  is obese.   Cardiovascular:      Rate and Rhythm: Normal rate and regular rhythm.      Heart sounds: Normal heart sounds.   Pulmonary:      Effort: Pulmonary effort is normal.      Breath sounds: Normal breath sounds.   Abdominal:      General: Bowel sounds are normal.      Palpations: Abdomen is soft.   Musculoskeletal:         General: Normal range of motion.      Lumbar back: Spasms and tenderness present.      Comments: Pain with movement    Neurological:      General: No focal deficit present.      Mental Status: She is alert and oriented to person, place, and time.   Psychiatric:         Mood and Affect: Mood normal.          Result Review :                   Assessment and Plan    Diagnoses and all orders for this visit:    1. Chronic midline low back pain with bilateral sciatica (Primary)  Assessment & Plan:  She has had this ongoing back pain for more than 6 weeks and we have tried conservative management with oral medications and steroids and she has been going to physical therapy but her back pain is proved she did have degenerative changes on her x-ray and therefore we will go ahead and proceed with an MRI and if indicated referral to neurosurgery.    Orders:  -     MRI Lumbar Spine Without Contrast; Future    2. Class 1 obesity due to excess calories without serious comorbidity with body mass index (BMI) of 30.0 to 30.9 in adult  Assessment & Plan:  Patient's (Body mass index is 30.47 kg/m².) indicates that they are obese (BMI >30) with health conditions that include none . Weight is improving with treatment. BMI  is above average; BMI management plan is completed. We discussed low calorie, low carb based diet program, portion control, increasing exercise, and pharmacologic options including continuing Wegovy which seems to be working well and she is tolerating it .           Follow Up   Return in about 4 weeks (around 12/27/2023) for Recheck.  Patient was given instructions and counseling regarding her  condition or for health maintenance advice. Please see specific information pulled into the AVS if appropriate.     Andreia Vargas PA-C

## 2023-11-30 NOTE — ASSESSMENT & PLAN NOTE
Patient's (Body mass index is 30.47 kg/m².) indicates that they are obese (BMI >30) with health conditions that include none . Weight is improving with treatment. BMI  is above average; BMI management plan is completed. We discussed low calorie, low carb based diet program, portion control, increasing exercise, and pharmacologic options including continuing Wegovy which seems to be working well and she is tolerating it .

## 2023-11-30 NOTE — ASSESSMENT & PLAN NOTE
She has had this ongoing back pain for more than 6 weeks and we have tried conservative management with oral medications and steroids and she has been going to physical therapy but her back pain is proved she did have degenerative changes on her x-ray and therefore we will go ahead and proceed with an MRI and if indicated referral to neurosurgery.

## 2023-12-06 ENCOUNTER — SPECIALTY PHARMACY (OUTPATIENT)
Dept: ONCOLOGY | Facility: HOSPITAL | Age: 48
End: 2023-12-06
Payer: COMMERCIAL

## 2023-12-06 NOTE — PROGRESS NOTES
Specialty Pharmacy Patient Management Program  Neurology Initial Assessment     Molly Ferguson is a 48 y.o. female with migrainesseen by a Neurology provider and enrolled in the Neurology Patient Management program offered by [facility].  An initial outreach was conducted, including assessment of therapy appropriateness and specialty medication education for Botox. The patient was introduced to services offered by [facility], including: regular assessments, refill coordination, curbside pick-up or mail order delivery options, prior authorization maintenance, and financial assistance programs as applicable. The patient was also provided with contact information for the pharmacy team.     Insurance Coverage & Financial Support  [facility]    Relevant Past Medical History and Comorbidities  Relevant medical history and concomitant health conditions were discussed with the patient. The patient's chart has been reviewed for relevant past medical history and comorbid health conditions and updated as necessary.   Past Medical History:   Diagnosis Date    Anxiety     Depression     Depressive disorder     GERD (gastroesophageal reflux disease)     History of anxiety     History of hyperlipidemia     Low back pain     Migraines     Obesity     Patellar malalignment syndrome of left knee     PONV (postoperative nausea and vomiting)     Tennis elbow     Trigger thumb     Visual disturbances      Social History     Socioeconomic History    Marital status:    Tobacco Use    Smoking status: Former     Packs/day: 1.00     Years: 5.00     Additional pack years: 0.00     Total pack years: 5.00     Types: Cigarettes     Quit date: 2023     Years since quittin.6    Smokeless tobacco: Never   Vaping Use    Vaping Use: Never used   Substance and Sexual Activity    Alcohol use: Not Currently     Comment: Once every six months or so    Drug use: Never    Sexual  activity: Yes     Partners: Male     Birth control/protection: Bilateral salpingectomy      Comment: Hysterectomy     Problem list reviewed by Berta Davis PharmD on 12/6/2023 at 11:01 AM    Allergies  Known allergies and reactions were discussed with the patient. The patient's chart has been reviewed for  allergy information and updated as necessary.   Allergies   Allergen Reactions    Codeine Shortness Of Breath    Nickel Hives    Penicillins Rash    Morphine Rash    Morphine And Related GI Intolerance     Allergies reviewed by Berta Davis PharmD on 12/6/2023 at 11:00 AM    Relevant Laboratory Values      Lab Assessment  The above labs have been reviewed. No dose adjustments are needed for the specialty medication(s) based on the labs.     Current Medication List  This medication list has been reviewed with the patient and evaluated for any interactions or necessary modifications/recommendations, and updated to include all prescription medications, OTC medications, and supplements the patient is currently taking.  This list reflects what is contained in the patient's profile, which has also been marked as reviewed to communicate to other providers it is the most up to date version of the patient's current medication therapy.     Current Outpatient Medications:     buPROPion XL (WELLBUTRIN XL) 300 MG 24 hr tablet, TAKE 1 TABLET BY MOUTH DAILY, Disp: 90 tablet, Rfl: 0    busPIRone (BUSPAR) 10 MG tablet, Take 1 tablet by mouth Daily., Disp: , Rfl:     cyclobenzaprine (FLEXERIL) 10 MG tablet, Take 1 tablet by mouth 2 (Two) Times a Day., Disp: , Rfl:     docusate sodium 250 MG capsule, As Needed., Disp: , Rfl:     Emgality 120 MG/ML solution prefilled syringe, Every 30 (Thirty) Days., Disp: , Rfl:     gabapentin (NEURONTIN) 600 MG tablet, TAKE 2 TABLETS BY MOUTH EVERY MORNING, ONE TABLET BY MOUTH AT NOON AND TAKE TWO TABLETS BY MOUTH ONCE NIGHTLY, Disp: 150 tablet, Rfl: 0    hydrOXYzine pamoate  (VISTARIL) 50 MG capsule, TAKE ONE CAPSULE BY MOUTH EVERY 8 HOURS AS NEEDED FOR ANXIETY (Patient taking differently: Take 1 capsule by mouth Daily.), Disp: 90 capsule, Rfl: 1    nabumetone (Relafen) 750 MG tablet, Take 1 tablet by mouth 2 (Two) Times a Day., Disp: 60 tablet, Rfl: 1    OnabotulinumtoxinA 200 units reconstituted solution, Inject 200 units IM into head neck shoulders every 12 weeks per FDA protocol Dx G43.719 SHIP TO [facility]. (Patient taking differently: Every 3 (Three) Months. Inject 200 units IM into head neck shoulders every 12 weeks per FDA protocol Dx G43.719 SHIP TO [facility].), Disp: 1 each, Rfl: 3    pantoprazole (PROTONIX) 40 MG EC tablet, Take 1 tablet by mouth Daily., Disp: 90 tablet, Rfl: 2    saccharomyces boulardii (FLORASTOR) 250 MG capsule, Take 1 capsule by mouth Daily., Disp: , Rfl:     Semaglutide-Weight Management (Wegovy) 1 MG/0.5ML solution auto-injector, INJECT 1 MG UNDER THE SKIN ONCE WEEKLY, Disp: 2 mL, Rfl: 3    SUMAtriptan (IMITREX) 100 MG tablet, TAKE 1 TABLET BY MOUTH AT ONSET OF HEADACHE; MAY REPEAT 1 TABLET IN 2 HOURS IF NEEDED. MAX 2 TABLETS DAILY, Disp: 9 tablet, Rfl: 0    SUMAtriptan Succinate (IMITREX) 6 MG/0.5ML injection, INJECT PRESCRIBED DOES AT ONSET OF HEADACHE. MAY REPEAT DOSE ONE TIME IN 1 HOUR IF HEADACHE NOT RELIEVED., Disp: 1 mL, Rfl: 6    traZODone (DESYREL) 100 MG tablet, TAKE ONE TABLET BY MOUTH ONCE NIGHTLY, Disp: 90 tablet, Rfl: 1    Trokendi  MG capsule sustained-release 24 hr, TAKE ONE CAPSULE BY MOUTH DAILY, Disp: 90 capsule, Rfl: 2    ubrogepant (UBRELVY) 50 MG tablet, Take 2 tablets by mouth As Needed (As needed) for up to 1 dose., Disp: 10 tablet, Rfl: 3    vilazodone (VIIBRYD) 20 MG tablet tablet, , Disp: , Rfl:   No current facility-administered medications for this visit.    Medicines reviewed by Berta Davis, PharmD on 12/6/2023 at 11:00 AM    Drug Interactions  none     Initial Education Provided for Specialty  Medication  The patient has been provided with the following education and any applicable administration techniques (i.e. self-injection) have been demonstrated for the therapies indicated. All questions and concerns have been addressed prior to the patient receiving the medication, and the patient has verbalized understanding of the education and any materials provided.  Additional patient education shall be provided and documented upon request by the patient, provider or payer.            Botox (onabotulinumtoxinA)      Medication Expectations   Why am I taking this medication? For prevention and relief of migraines.   What should I expect while on this medication? You should expect to see a decrease in the severity and frequency of migraines..     How does the medication work? Botox enters the nerve endings around where it is injected and blocks the release of chemicals involved in pain transmission.  This prevents activation of pain networks in the brain.   How long will I be on this medication for? The amount of time you will be on this medication will be determined by your doctor based your response.    How do I take this medication? This medication will be given in the office.  It will be given IM into each of 31 sites divided across 7 specific head/neck muscle areas.     What are some possible side effects? Potential side effects including, but not limited to: neck pain and stiffness, neck weakness,  temporary drooping of the eye, rare flu-like symptoms (such as muscle aches and fever), dry eyes, injections site pain, bleeding, infection, failure of the procedure to help.  More serious side effect such as allergic reaction, dysphagia, respiratory distress, .  Discussed that it may take 10-14 days after first treatment to see improvement of headaches, and that the in-office treatments are done every 12 weeks.  I gave the patient my name and contact information for any additional questions or concerns.        What happens if I miss a dose? N/A                Medication Safety   What are things I should warn my doctor immediately about? Let the provider know immediately if you have difficulty breathing or swallowing, weakness of neck muscles.   What are things that I should be cautious of?  Injections near the eye: This medicine may reduce blinking, which can raise the risk of eye problems, including corneal exposure and ulcers. Tell your doctor right away if you notice that you are blinking less than usual or your eyes feel dry   What are some medications that can interact with this one? N/A          Medication Storage/Handling   How should I handle this medication? N/A   How does this medication need to be stored? N/A   How should I dispose of this medication? N/A          Resources/Support   How can I remind myself to take this medication? Patient will be scheduled every 3 months in clinic.   Is financial support available?  [employer] Botox savings program.   Which vaccines are recommended for me? Talk to your doctor about these vaccines: Flu, Coronavirus (COVID-19), Pneumococcal (pneumonia), Tdap, Hepatitis B, Zoster (shingles)            Adherence and Self-Administration  Adherence related to the patient's specialty therapy was discussed with the patient. The Adherence segment of this outreach has been reviewed and updated.   Is there a concern with patient's ability to self administer the medication correctly and without issue?: No  Were any potential barriers to adherence identified during the initial assessment or patient education?: No  Are there any concerns regarding the patient's understanding of the importance of medication adherence?: No  Methods for Supporting Patient Adherence and/or Self-Administration: none    Goals of Therapy  Goals related to the patient's specialty therapy were discussed with the patient. The Patient Goals segment of this outreach has been reviewed and updated.   Goals Addressed  Today        Specialty Pharmacy General Goal      Decrease frequency, severity and duration of migraine attacks                 Reassessment Plan & Follow-Up  Medication Therapy Changes: Botox 155 units IM to be administered by Provider in office every 3 months.  Related Plans, Therapy Recommendations, or Therapy Problems to Be Addressed: none  Pharmacist to perform regular reassessments no more than (6) months from the previous assessment.  Care Coordinator to set up future refill outreaches, coordinate prescription delivery, and escalate clinical questions to pharmacist.   Welcome information and patient satisfaction survey to be sent by specialty pharmacy team with patient's initial fill.    Attestation  Therapeutic appropriateness: Appropriate   I attest the patient was actively involved in and has agreed to the above plan of care. If the prescribed therapy is at any point deemed not appropriate based on the current or future assessments, a consultation will be initiated with the patient's specialty care provider to determine the best course of action. The revised plan of therapy will be documented along with any additional patient education provided. Discussed aforementioned material with patient via telemedicine.    Berta Davis PharmD, [facility] Specialty Pharmacist, Neurology  12/6/2023  11:03 EST

## 2023-12-07 RX ORDER — ONDANSETRON 4 MG/1
4 TABLET, FILM COATED ORAL EVERY 8 HOURS PRN
Qty: 20 TABLET | Refills: 0 | Status: SHIPPED | OUTPATIENT
Start: 2023-12-07

## 2023-12-13 ENCOUNTER — PROCEDURE VISIT (OUTPATIENT)
Dept: NEUROLOGY | Facility: CLINIC | Age: 48
End: 2023-12-13
Payer: COMMERCIAL

## 2023-12-13 DIAGNOSIS — G43.719 INTRACTABLE CHRONIC MIGRAINE WITHOUT AURA AND WITHOUT STATUS MIGRAINOSUS: Primary | ICD-10-CM

## 2023-12-13 NOTE — PROGRESS NOTES
CC:  Migraines.    HPI : Patient is in clinic for Botox injection.  Current headache frequency is approximately 6-8 headache days in a month with some headaches lasting for more than 4 hours.      Botox Procedure Note    Current headache frequency: 6-8 headaches days / month.    The risks and benefits were discussed with the patient. The patient was given the opportunity to ask questions. Informed consent form was signed.    Onabotulinumtoxin A was reconstituted with 0.9% normal saline. All injections sites were prepped with alcohol swab. Approximately 5 units of botox were injected into each of the following sites bilaterally as per routine migraine botox injection PREEMPT protocol: , procerus, frontalis, temporalis, occipitalis, upper cervical paraspinals, and trapezius.    The patient tolerated the procedure well. There were no immediate complications. The patient will follow up in approximately 12 weeks for her next injection.    Total amount of onabotulinumtoxin A injected was 155 units with 45 units wasted.    Note: Insurance did not approve Qulipta so now she is taking Emgality once a month injection.  She did try Zavzpret sample and it did work as an abortive treatment.  I have given her Zavzpret co-pay card so that she can get it from her pharmacy for future use.  Otherwise, I will see her back in clinic in 12 weeks for next injection.

## 2023-12-15 ENCOUNTER — TELEPHONE (OUTPATIENT)
Dept: FAMILY MEDICINE CLINIC | Facility: CLINIC | Age: 48
End: 2023-12-15
Payer: COMMERCIAL

## 2023-12-15 NOTE — TELEPHONE ENCOUNTER
HUB TO RELAY:    PATIENT IS SCHEDULED AT [facility] FOR HER MRI ON 1-4-2023 AT 10:00AM. THE NUMBER TO IDS [phone] IF SHE NEEDS DIRECTIONS OR MORE INFO.

## 2023-12-15 NOTE — TELEPHONE ENCOUNTER
Name: Molly Ferguson    Relationship: Self    Best Callback Number: [phone]     HUB PROVIDED THE RELAY MESSAGE FROM THE OFFICE   PATIENT VOICED UNDERSTANDING AND HAS NO FURTHER QUESTIONS AT THIS TIME

## 2023-12-18 ENCOUNTER — DOCUMENTATION (OUTPATIENT)
Dept: NEUROLOGY | Facility: CLINIC | Age: 48
End: 2023-12-18
Payer: COMMERCIAL

## 2023-12-18 NOTE — PROGRESS NOTES
Molly has tried and failed Emgality and Aimovig, our pharmacy team will begin PA process for Qulipta.

## 2023-12-20 ENCOUNTER — SPECIALTY PHARMACY (OUTPATIENT)
Dept: ONCOLOGY | Facility: HOSPITAL | Age: 48
End: 2023-12-20
Payer: COMMERCIAL

## 2023-12-20 DIAGNOSIS — G43.719 INTRACTABLE CHRONIC MIGRAINE WITHOUT AURA AND WITHOUT STATUS MIGRAINOSUS: ICD-10-CM

## 2023-12-20 RX ORDER — SUMATRIPTAN 100 MG/1
TABLET, FILM COATED ORAL
Qty: 9 TABLET | Refills: 0 | Status: SHIPPED | OUTPATIENT
Start: 2023-12-20

## 2023-12-20 RX ORDER — ZAVEGEPANT 10 MG/.1ML
1 SPRAY NASAL AS NEEDED
Qty: 1 EACH | Refills: 3 | Status: SHIPPED | OUTPATIENT
Start: 2023-12-20 | End: 2023-12-20 | Stop reason: SDUPTHER

## 2023-12-20 RX ORDER — CEFUROXIME AXETIL 250 MG/1
TABLET ORAL
Qty: 1 ML | Refills: 6 | Status: SHIPPED | OUTPATIENT
Start: 2023-12-20

## 2023-12-20 RX ORDER — ZAVEGEPANT 10 MG/.1ML
1 SPRAY NASAL AS NEEDED
Qty: 6 EACH | Refills: 3 | Status: SHIPPED | OUTPATIENT
Start: 2023-12-20

## 2023-12-20 NOTE — TELEPHONE ENCOUNTER
Rx Refill Note  Requested Prescriptions     Pending Prescriptions Disp Refills    SUMAtriptan Succinate (IMITREX) 6 MG/0.5ML injection [Pharmacy Med Name: SUMAtriptan 6 MG/0.5 ML INJECT] 1 mL 6     Sig: INJECT PRESCRIBED DOSE AT ONSET OF HEADACHE. MAY REPEAT DOSE ONE TIME IN 1 HOUR IF HEADACHE NOT RELIEVED    SUMAtriptan (IMITREX) 100 MG tablet [Pharmacy Med Name: SUMATRIPTAN SUCC 100 MG TABLET] 9 tablet 0     Sig: TAKE ONE TABLET BY MOUTH AT ONSET OF HEADACHE; MAY REPEAT ONE TABLET IN 2 HOURS IF NEEDED. MAX OF 200MG A DAY      Last filled:    Imitrex tabs-- 11/17/23 #9 with 0 refills  Imitrex injection-- 2/3/23 #1 with 6 refills     Last office visit with prescribing clinician: 12/13/23 procedure visit  Next office visit with prescribing clinician: 3/13/2024     Luis Jesus MA  12/20/23, 14:16 EST

## 2023-12-20 NOTE — PROGRESS NOTES
Specialty Pharmacy Patient Management Program  Neurology Medication Addition Assessment     Molly Ferguson is a 48 y.o. female with migraines seen by a Neurology provider and enrolled in the Neurology Patient Management program offered by [facility].  This assessment was conducted as a result of a specialty medication addition or substitution. The patient was previously introduced to services offered by [facility], including: regular assessments, refill coordination, curbside pick-up or mail order delivery options, prior authorization maintenance, and financial assistance programs as applicable. The patient was also provided with contact information for the pharmacy team.     An initial outreach was conducted, including assessment of therapy appropriateness and specialty medication education for Zavzpret.    A follow-up outreach was conducted, including assessment of continued therapy appropriateness, medication adherence, and side effect incidence and management for Botox.    Insurance Coverage & Financial Support  [facility]  Zavzpret Co-Pay card    Relevant Past Medical History and Comorbidities  Relevant medical history and concomitant health conditions were discussed with the patient. The patient's chart has been reviewed for relevant past medical history and comorbid health conditions and updated as necessary.   Past Medical History:   Diagnosis Date    Anxiety     Depression     Depressive disorder     GERD (gastroesophageal reflux disease)     History of anxiety     History of hyperlipidemia     Low back pain     Migraines     Obesity     Patellar malalignment syndrome of left knee     PONV (postoperative nausea and vomiting)     Tennis elbow     Trigger thumb     Visual disturbances      Social History     Socioeconomic History    Marital status:    Tobacco Use    Smoking status: Former     Packs/day: 1.00     Years: 5.00     Additional pack  years: 0.00     Total pack years: 5.00     Types: Cigarettes     Quit date: 2023     Years since quittin.6    Smokeless tobacco: Never   Vaping Use    Vaping Use: Never used   Substance and Sexual Activity    Alcohol use: Not Currently     Comment: Once every six months or so    Drug use: Never    Sexual activity: Yes     Partners: Male     Birth control/protection: Bilateral salpingectomy      Comment: Hysterectomy     Problem list reviewed by Berta Davis PharmD on 2023 at 10:55 AM    Hospitalizations and Urgent Care Since Last Assessment  ED Visits, Admissions, or Hospitalizations: none   Urgent Office Visits: none     Allergies  Known allergies and reactions were discussed with the patient. The patient's chart has been reviewed for  allergy information and updated as necessary.   Allergies   Allergen Reactions    Codeine Shortness Of Breath    Nickel Hives    Penicillins Rash    Morphine Rash    Morphine And Related GI Intolerance     Allergies reviewed by Berta Davis, [place] on 2023 at 10:54 AM    Relevant Laboratory Values      Lab Assessment  The above labs have been reviewed. No dose adjustments are needed for the specialty medication(s) based on the labs.     Current Medication List  This medication list has been reviewed with the patient and evaluated for any interactions or necessary modifications/recommendations, and updated to include all prescription medications, OTC medications, and supplements the patient is currently taking.  This list reflects what is contained in the patient's profile, which has also been marked as reviewed to communicate to other providers it is the most up to date version of the patient's current medication therapy.     Current Outpatient Medications:     Zavegepant HCl (Zavzpret) 10 MG/ACT solution, 1 spray by Each Nare route As Needed (for migraine. maximum of one spray (10 mg) per 24 hours)., Disp: 6 each, Rfl: 3    buPROPion XL (WELLBUTRIN  XL) 300 MG 24 hr tablet, TAKE 1 TABLET BY MOUTH DAILY, Disp: 90 tablet, Rfl: 0    busPIRone (BUSPAR) 10 MG tablet, Take 1 tablet by mouth Daily., Disp: , Rfl:     cyclobenzaprine (FLEXERIL) 10 MG tablet, Take 1 tablet by mouth 2 (Two) Times a Day., Disp: , Rfl:     docusate sodium 250 MG capsule, As Needed., Disp: , Rfl:     Emgality 120 MG/ML solution prefilled syringe, Every 30 (Thirty) Days., Disp: , Rfl:     gabapentin (NEURONTIN) 600 MG tablet, TAKE 2 TABLETS BY MOUTH EVERY MORNING, ONE TABLET BY MOUTH AT NOON AND TAKE TWO TABLETS BY MOUTH ONCE NIGHTLY, Disp: 150 tablet, Rfl: 0    hydrOXYzine pamoate (VISTARIL) 50 MG capsule, TAKE ONE CAPSULE BY MOUTH EVERY 8 HOURS AS NEEDED FOR ANXIETY (Patient taking differently: Take 1 capsule by mouth Daily.), Disp: 90 capsule, Rfl: 1    nabumetone (Relafen) 750 MG tablet, Take 1 tablet by mouth 2 (Two) Times a Day., Disp: 60 tablet, Rfl: 1    OnabotulinumtoxinA 200 units reconstituted solution, Inject 200 units IM into head neck shoulders every 12 weeks per FDA protocol Dx G43.719 SHIP TO SANDRITA HOLDEN., Disp: 1 each, Rfl: 3    ondansetron (Zofran) 4 MG tablet, Take 1 tablet by mouth Every 8 (Eight) Hours As Needed for Nausea or Vomiting., Disp: 20 tablet, Rfl: 0    pantoprazole (PROTONIX) 40 MG EC tablet, Take 1 tablet by mouth Daily., Disp: 90 tablet, Rfl: 2    saccharomyces boulardii (FLORASTOR) 250 MG capsule, Take 1 capsule by mouth Daily., Disp: , Rfl:     Semaglutide-Weight Management (Wegovy) 1 MG/0.5ML solution auto-injector, INJECT 1 MG UNDER THE SKIN ONCE WEEKLY, Disp: 2 mL, Rfl: 3    SUMAtriptan (IMITREX) 100 MG tablet, TAKE 1 TABLET BY MOUTH AT ONSET OF HEADACHE; MAY REPEAT 1 TABLET IN 2 HOURS IF NEEDED. MAX 2 TABLETS DAILY, Disp: 9 tablet, Rfl: 0    SUMAtriptan Succinate (IMITREX) 6 MG/0.5ML injection, INJECT PRESCRIBED DOES AT ONSET OF HEADACHE. MAY REPEAT DOSE ONE TIME IN 1 HOUR IF HEADACHE NOT RELIEVED., Disp: 1 mL, Rfl: 6    traZODone (DESYREL) 100 MG  tablet, TAKE ONE TABLET BY MOUTH ONCE NIGHTLY, Disp: 90 tablet, Rfl: 1    Trokendi  MG capsule sustained-release 24 hr, TAKE ONE CAPSULE BY MOUTH DAILY, Disp: 90 capsule, Rfl: 2    ubrogepant (UBRELVY) 50 MG tablet, Take 2 tablets by mouth As Needed (As needed) for up to 1 dose., Disp: 10 tablet, Rfl: 3    vilazodone (VIIBRYD) 20 MG tablet tablet, , Disp: , Rfl:     Current Facility-Administered Medications:     OnabotulinumtoxinA 200 Units, 200 Units, Intramuscular, Q3 Months, Darrell Del Valle MD, 200 Units at 12/13/23 1607    Medicines reviewed by Berta Davis, PharmD on 12/20/2023 at 10:54 AM    Drug Interactions  none     Initial Education Provided for Specialty Medication  The patient has been provided with the following education and any applicable administration techniques (i.e. self-injection) have been demonstrated for the therapies indicated. All questions and concerns have been addressed prior to the patient receiving the medication, and the patient has verbalized comprehension of the education and any materials provided.  Additional patient education shall be provided and documented upon request by the patient, provider or payer.      Zavzpret (zavegepant)  Medication Expectations   Why am I taking this medication? You are taking this medication to treat an acute migraine.   What should I expect while on this medication? You should expect to see a decrease in the frequency and severity of your migraines.   How does the medication work? Zavzpret is a monoclonal antibody that binds to calcitonin gene-related peptide (CGRP) and blocks its binding to the receptor decreasing the severity of migraines.   How long will I be on this medication for? The amount of time you will be on this medication will be determined by your doctor and your response to the medication.    How do I take this medication? Take as directed on your prescription label.   What are some possible side effects? Potential side  effects including, but not limited to taste disorders, nausea, nasal discomfort, and vomiting. Pt verbalized understanding.   What happens if I miss a dose? Not applicable     Medication Safety   What are things I should warn my doctor immediately about? Hypersensitivity reactions - trouble breathing or swallowing.   What are things that I should be cautious of? Hypersensitivity reactions (eg, dyspnea, rash), including delayed serious reactions, have occurred; discontinue use if suspected    What are some medications that can interact with this one? Avoid concomitant administration of Zavzpret with drugs that induce or inhibit [id number] or NTCP transporters. Avoid use of intranasal decongestants; if unavoidable, administer intranasal decongestants at least 1 hour after ZAVZPRET administration. Ask your pharmacist or health care provider before starting new medications     Medication Storage/Handling   How should I handle this medication? Keep this medication out of reach of pets/children in original container. Keep device in sealed blister pack until ready to use; each device is single use and contains only 1 dose. Do not test spray, prime, or press the plunger before use. While sitting or standing, gently blow nose to clear nostrils. Hold device upright with thumb on bottom of the plunger and 2 fingers on either side of the nozzle. With other hand, close 1 nostril gently. While keeping head level and upright, and mouth closed, insert nozzle into open nostril as far as comfortable. Instruct patient to slowly breathe in through nose and press plunger firmly with thumb to release spray; retain nozzle in nose during dose administration; do not remove nozzle while pressing plunger. Do not tilt head or lay down while delivering dose. Remove nozzle from nostril; keep head level for 10 to 20 seconds and gently breathe in through nose and out through mouth. If nose starts dripping, gently sniff to avoid losing any of the  dose.   How does this medication need to be stored? Store at room temperature away from heat/cold, sunlight or moisture.   How should I dispose of this medication? There should not be a need to dispose of this medication unless your provider decides to change the dose or therapy. If that is the case, take to your local police station for proper disposal. Some pharmacies also have take-back bins for medication drop-off.      Resources/Support   How can I remind myself to take this medication? N/A - abortive medication.   Is financial support available?  Yes, [employer] can provide co-pay cards if you have commercial insurance or patient assistance if you have Medicare or no insurance.    Which vaccines are recommended for me? Talk to your doctor about these vaccines: Flu, Coronavirus (COVID-19), Pneumococcal (pneumonia), Tdap, Hepatitis B, Zoster (shingles)          Adherence, Self-Administration, and Current Therapy Problems  Adherence related to the patient's specialty therapy was discussed with the patient. The Adherence segment of this outreach has been reviewed and updated.     Is there a concern with patient's ability to self administer the medication correctly and without issue?: No  Were any potential barriers to adherence identified during the initial assessment or patient education?: No  Are there any concerns regarding the patient's understanding of the importance of medication adherence?: No    Adherence Questions  Linked Medication(s) Assessed: Onabotulinumtoxina  On average, how many doses/injections does the patient miss per month?: 0  What are the identified reasons for non-adherence or missed doses? : no problems identified  What is the estimated medication adherence level?: %  Based on the patient/caregiver response and refill history, does this patient require an MTP to track adherence improvements?: no    Additional Barriers to Patient Self-Administration: none  Methods for Supporting Patient  Self-Administration: not required    Recently Close Medication Therapy Problems  No medication therapy recommendations to display  Open Medication Therapy Problems  No medication therapy recommendations to display     Adverse Drug Reactions  Medication tolerability: Tolerating with no to minimal ADRs          Medication plan: Continue therapy with normal follow-up  Plan for ADR Management: not required    Goals of Therapy  Goals related to the patient's specialty therapy were discussed with the patient. The Patient Goals segment of this outreach has been reviewed and updated.   Goals Addressed Today        Specialty Pharmacy General Goal      Decrease frequency, severity and duration of migraine attacks (Current headache frequency is approximately 6-8 headache days in a month with some headaches lasting for more than 4 hours.)                 Quality of Life Assessment   Quality of Life related to the patient's enrollment in the patient management program and services provided was discussed with the patient. The QOL segment of this outreach has been reviewed and updated.   Quality of Life Improvement Scale: 9-A good deal better    Reassessment Plan & Follow-Up  Medication Therapy Changes: Zavzpret 1 spray by Each Nare route As Needed (for migraine. maximum of one spray (10 mg) per 24 hours).  Related Plans, Therapy Recommendations, or Issues to Be Addressed: none   Pharmacist to perform regular reassessments no more than (6) months from the previous assessment.  Care Coordinator to set up future refill outreaches, coordinate prescription delivery, and escalate clinical questions to pharmacist.     Attestation  Therapeutic appropriateness: Appropriate  I attest the patient was actively involved in and has agreed to the above plan of care. If the prescribed therapy is at any point deemed not appropriate based on the current or future assessments, a consultation will be initiated with the patient's specialty care  provider to determine the best course of action. The revised plan of therapy will be documented along with any additional patient education provided. Discussed aforementioned material with patient via telemedicine.    Berta Davis, PharmD, BCPS  Clinic Specialty Pharmacist, Neurology  12/20/2023  10:56 EST

## 2024-01-04 DIAGNOSIS — G43.719 INTRACTABLE CHRONIC MIGRAINE WITHOUT AURA AND WITHOUT STATUS MIGRAINOSUS: ICD-10-CM

## 2024-01-04 RX ORDER — SUMATRIPTAN 100 MG/1
TABLET, FILM COATED ORAL
Qty: 9 TABLET | Refills: 0 | Status: SHIPPED | OUTPATIENT
Start: 2024-01-04

## 2024-01-04 RX ORDER — ONDANSETRON 4 MG/1
TABLET, FILM COATED ORAL
Qty: 18 TABLET | Refills: 1 | Status: SHIPPED | OUTPATIENT
Start: 2024-01-04

## 2024-01-04 NOTE — TELEPHONE ENCOUNTER
Rx Refill Note  Requested Prescriptions     Pending Prescriptions Disp Refills    SUMAtriptan (IMITREX) 100 MG tablet [Pharmacy Med Name: SUMATRIPTAN SUCC 100 MG TABLET] 9 tablet 0     Sig: TAKE ONE TABLET BY MOUTH AT ONSET OF HEADACHE; MAY REPEAT ONE TABLET IN 2 HOURS IF NEEDED. MAX OF 200MG A DAY      Last filled:  Last office visit with prescribing clinician: Visit date not found      Next office visit with prescribing clinician: 1/4/2024     Luis Jesus MA  01/04/24, 13:16 EST

## 2024-01-04 NOTE — TELEPHONE ENCOUNTER
Rx Refill Note  Requested Prescriptions     Pending Prescriptions Disp Refills    gabapentin (NEURONTIN) 600 MG tablet 150 tablet 0     Sig: TAKE 2 TABLETS BY MOUTH EVERY MORNING, ONE TABLET BY MOUTH AT NOON AND TAKE TWO TABLETS BY MOUTH ONCE NIGHTLY      Last filled: 10/4/23 30 days + 0 refills. Confirmed with pt she is still taking as prescribed.     Last office visit with prescribing clinician: 12/13/23 procedure visit      Next office visit with prescribing clinician: 3/13/2024     Luis Jesus MA  01/04/24, 13:18 EST

## 2024-01-05 RX ORDER — GABAPENTIN 600 MG/1
TABLET ORAL
Qty: 150 TABLET | Refills: 0 | Status: SHIPPED | OUTPATIENT
Start: 2024-01-05

## 2024-01-05 NOTE — TELEPHONE ENCOUNTER
I have reviewed manual [place] which shows that last Gabapentin 600 mg, 150 tablets, 30 day supply was dispensed on 10/7/2023. I will send for 30 day supply since she reports that she has been taking this as prescribed and she can request further refills from Dr. Del Valle. Next visit with Dr. Del Valle is scheduled for 3/13/2024. Thanks!

## 2024-01-08 ENCOUNTER — SPECIALTY PHARMACY (OUTPATIENT)
Dept: LAB | Facility: HOSPITAL | Age: 49
End: 2024-01-08
Payer: COMMERCIAL

## 2024-01-08 RX ORDER — ATOGEPANT 60 MG/1
60 TABLET ORAL DAILY
Qty: 30 TABLET | Refills: 11 | Status: SHIPPED | OUTPATIENT
Start: 2024-01-08

## 2024-01-08 RX ORDER — GALCANEZUMAB 120 MG/ML
120 INJECTION, SOLUTION SUBCUTANEOUS
Qty: 1 ML | Refills: 11 | Status: SHIPPED | OUTPATIENT
Start: 2024-01-08

## 2024-01-08 NOTE — PROGRESS NOTES
Specialty Pharmacy Patient Management Program  Neurology Medication Addition Assessment     Molly Ferguson is a 48 y.o. female with migraines seen by a Neurology provider and enrolled in the Neurology Patient Management program offered by [facility].  This assessment was conducted as a result of a specialty medication addition or substitution. The patient was previously introduced to services offered by [facility], including: regular assessments, refill coordination, curbside pick-up or mail order delivery options, prior authorization maintenance, and financial assistance programs as applicable. The patient was also provided with contact information for the pharmacy team.     An initial outreach was conducted, including assessment of therapy appropriateness and specialty medication education for Qulipta and Emgality.    A follow-up outreach was conducted, including assessment of continued therapy appropriateness, medication adherence, and side effect incidence and management for Botox and Zavzpret.    Insurance Coverage & Financial Support  [facility]  Qulipta Copay Card  Emgality Copay card    Relevant Past Medical History and Comorbidities  Relevant medical history and concomitant health conditions were discussed with the patient. The patient's chart has been reviewed for relevant past medical history and comorbid health conditions and updated as necessary.   Past Medical History:   Diagnosis Date    Anxiety     Depression     Depressive disorder     GERD (gastroesophageal reflux disease)     History of anxiety     History of hyperlipidemia     Low back pain     Migraines     Obesity     Patellar malalignment syndrome of left knee     PONV (postoperative nausea and vomiting)     Tennis elbow     Trigger thumb     Visual disturbances      Social History     Socioeconomic History    Marital status:    Tobacco Use    Smoking status: Former     Packs/day:  1.00     Years: 5.00     Additional pack years: 0.00     Total pack years: 5.00     Types: Cigarettes     Quit date: 2023     Years since quittin.7    Smokeless tobacco: Never   Vaping Use    Vaping Use: Never used   Substance and Sexual Activity    Alcohol use: Not Currently     Comment: Once every six months or so    Drug use: Never    Sexual activity: Yes     Partners: Male     Birth control/protection: Bilateral salpingectomy      Comment: Hysterectomy     Problem list reviewed by Berta Davis, PharmD on 2024 at 10:03 AM    Hospitalizations and Urgent Care Since Last Assessment  ED Visits, Admissions, or Hospitalizations: none   Urgent Office Visits: none     Allergies  Known allergies and reactions were discussed with the patient. The patient's chart has been reviewed for  allergy information and updated as necessary.   Allergies   Allergen Reactions    Codeine Shortness Of Breath    Nickel Hives    Penicillins Rash    Morphine Rash    Morphine And Related GI Intolerance     Allergies reviewed by Berta Davis, PharmD on 2024 at 10:01 AM    Relevant Laboratory Values      Lab Assessment  The above labs have been reviewed. No dose adjustments are needed for the specialty medication(s) based on the labs.     Current Medication List  This medication list has been reviewed with the patient and evaluated for any interactions or necessary modifications/recommendations, and updated to include all prescription medications, OTC medications, and supplements the patient is currently taking.  This list reflects what is contained in the patient's profile, which has also been marked as reviewed to communicate to other providers it is the most up to date version of the patient's current medication therapy.     Current Outpatient Medications:     Emgality 120 MG/ML solution prefilled syringe, Inject 1 mL under the skin into the appropriate area as directed Every 30 (Thirty) Days., Disp: 1 mL, Rfl:  11    Atogepant (Qulipta) 60 MG tablet, Take 1 tablet by mouth Daily., Disp: 30 tablet, Rfl: 11    buPROPion XL (WELLBUTRIN XL) 300 MG 24 hr tablet, TAKE 1 TABLET BY MOUTH DAILY, Disp: 90 tablet, Rfl: 0    busPIRone (BUSPAR) 10 MG tablet, Take 1 tablet by mouth Daily., Disp: , Rfl:     cyclobenzaprine (FLEXERIL) 10 MG tablet, Take 1 tablet by mouth 2 (Two) Times a Day., Disp: , Rfl:     docusate sodium 250 MG capsule, As Needed., Disp: , Rfl:     gabapentin (NEURONTIN) 600 MG tablet, TAKE 2 TABLETS BY MOUTH EVERY MORNING, ONE TABLET BY MOUTH AT NOON AND TAKE TWO TABLETS BY MOUTH ONCE NIGHTLY, Disp: 150 tablet, Rfl: 0    hydrOXYzine pamoate (VISTARIL) 50 MG capsule, TAKE ONE CAPSULE BY MOUTH EVERY 8 HOURS AS NEEDED FOR ANXIETY (Patient taking differently: Take 1 capsule by mouth Daily.), Disp: 90 capsule, Rfl: 1    nabumetone (Relafen) 750 MG tablet, Take 1 tablet by mouth 2 (Two) Times a Day., Disp: 60 tablet, Rfl: 1    OnabotulinumtoxinA 200 units reconstituted solution, Inject 200 units IM into head neck shoulders every 12 weeks per FDA protocol Dx G43.719 SHIP TO SANDRITA HOLDEN., Disp: 1 each, Rfl: 3    ondansetron (ZOFRAN) 4 MG tablet, TAKE 1 TABLET BY MOUTH EVERY 8 HOURS AS NEEDED FOR NAUSEA AND/OR VOMITING, Disp: 18 tablet, Rfl: 1    pantoprazole (PROTONIX) 40 MG EC tablet, Take 1 tablet by mouth Daily., Disp: 90 tablet, Rfl: 2    saccharomyces boulardii (FLORASTOR) 250 MG capsule, Take 1 capsule by mouth Daily., Disp: , Rfl:     Semaglutide-Weight Management (Wegovy) 1 MG/0.5ML solution auto-injector, INJECT 1 MG UNDER THE SKIN ONCE WEEKLY, Disp: 2 mL, Rfl: 3    SUMAtriptan (IMITREX) 100 MG tablet, TAKE ONE TABLET BY MOUTH AT ONSET OF HEADACHE; MAY REPEAT ONE TABLET IN 2 HOURS IF NEEDED. MAX OF 200MG A DAY, Disp: 9 tablet, Rfl: 0    SUMAtriptan Succinate (IMITREX) 6 MG/0.5ML injection, INJECT PRESCRIBED DOSE AT ONSET OF HEADACHE. MAY REPEAT DOSE ONE TIME IN 1 HOUR IF HEADACHE NOT RELIEVED, Disp: 1 mL, Rfl:  6    traZODone (DESYREL) 100 MG tablet, TAKE ONE TABLET BY MOUTH ONCE NIGHTLY, Disp: 90 tablet, Rfl: 1    Trokendi  MG capsule sustained-release 24 hr, TAKE ONE CAPSULE BY MOUTH DAILY, Disp: 90 capsule, Rfl: 2    ubrogepant (UBRELVY) 50 MG tablet, Take 2 tablets by mouth As Needed (As needed) for up to 1 dose., Disp: 10 tablet, Rfl: 3    vilazodone (VIIBRYD) 20 MG tablet tablet, , Disp: , Rfl:     Zavegepant HCl (Zavzpret) 10 MG/ACT solution, Administer 1 spray in nostril As Needed for migraine. Maximum of one spray (10 mg) per 24 hours., Disp: 6 each, Rfl: 3  No current facility-administered medications for this visit.    Medicines reviewed by Berta Davis, PharmD on 1/8/2024 at 10:02 AM    Drug Interactions  none     Initial Education Provided for Specialty Medication  The patient has been provided with the following education and any applicable administration techniques (i.e. self-injection) have been demonstrated for the therapies indicated. All questions and concerns have been addressed prior to the patient receiving the medication, and the patient has verbalized comprehension of the education and any materials provided.  Additional patient education shall be provided and documented upon request by the patient, provider or payer.              Emgality (Galcanezumab-gn)        Medication Expectations   Why am I taking this medication? You are taking this medication for migraine prophylaxis.   What should I expect while on this medication? You should expect to a decrease in the frequency and severity of your migraines.   How does the medication work? Emgality is a monoclonal antibody that binds to calcitonin gene-related peptide (CGRP) and blocks its binding to the receptor decreasing the severity of migraines.   How long will I be on this medication for? The amount of time you will be on this medication will be determined by your doctor and your response to the medication.    How do I take this  medication? Take as directed on your prescription label. This medication is a self-injection given every 28 days.    What are some possible side effects? Injection site reactions and hypersensitivity reactions.   What happens if I miss a dose? If you miss a dose, take it as soon as you remember, and time next dose 28 days from last dose.                  Medication Safety   What are things I should warn my doctor immediately about? Hypersensitivity reactions.   What are things that I should be cautious of? Injection site reaction.   What are some medications that can interact with this one? No drug interactions identified.            Medication Storage/Handling   How should I handle this medication? Keep this medication our of reach of pets/children in original container.  On the day your Emgality is due let it set at room temperature for 30 minutes prior to injection. (do NOT warm using a heat source such as hot water or a microwave).  Administer in the abdomen, thigh, back of the upper arm, or buttocks.  Do not inject where the skin is tender, bruised, red or hard.  Rotate injection sites.   How does this medication need to be stored? Store in refrigerator and keep dry.   How should I dispose of this medication? You can dispose of the empty syringe in a sharps container, and if this is not available you may use an empty hard plastic container such as a milk jug or tide container.            Resources/Support   How can I remind myself to take this medication? You can download a reminder kashif on your phone or use a calandar  to help with your monthly injection.   Is financial support available?  Yes, [employer] can provide co-pay cards if you have commercial insurance or patient assistance if you have Medicare or no insurance.    Which vaccines are recommended for me? Talk to your doctor about these vaccines: Flu, Coronavirus (COVID-19), Pneumococcal (pneumonia), Tdap, Hepatitis B, Zoster (shingles)             Atogepant  (Qulipta)        Medication Expectations   Why am I taking this medication? You are taking this medication for migraine prophylaxis.   What should I expect while on this medication? You should expect to a decrease in the frequency and severity of your migraines.   How does the medication work? Qulipta is a monoclonal antibody that binds to calcitonin gene-related peptide (CGRP) and blocks its binding to the receptor decreasing the severity of migraines.   How long will I be on this medication for? The amount of time you will be on this medication will be determined by your doctor and your response to the medication.    How do I take this medication? Take as directed on your prescription label.   Take one tablet daily with or without food.   What are some possible side effects? Potential side effects including, but not limited to nausea, constipation and fatigue. Pt verbalized understanding.   What happens if I miss a dose? If you miss a dose of this medicine, take it as soon as possible. However, if it is almost time for your next dose, skip the missed dose and go back to your regular dosing schedule. Do not double doses.                  Medication Safety   What are things I should warn my doctor immediately about? Hypersensitivity reactions, such as trouble breathing or swallowing.   What are things that I should be cautious of? Be cautious driving until you know how this drug will affect you.   What are some medications that can interact with this one? Ketoconazole, Itraconazole, cyclosporin, clarithromycin, rifampin, carbamazepine, phenytion, [place]'s Wort, efavirenz, and etravine.            Medication Storage/Handling   How should I handle this medication? Keep this medication our of reach of pets/children in original container.   How does this medication need to be stored? Store at room temperature away from heat/cold, sunlight or moisture.   How should I dispose of this medication? There should not be a  need to dispose of this medication unless your provider decides to change the dose or therapy. If that is the case, take to your local police station for proper disposal. Some pharmacies also have take-back bins for medication drop-off.             Resources/Support   How can I remind myself to take this medication? You can download reminder apps to help you manage your refills. You may also set an alarm on your phone to remind you. The pharmacy carries pill boxes that you can place next to an area you pass everyday (such as where you place your car keys or where you charge your phone)   Is financial support available?  Yes, [employer] can provide co-pay cards if you have commercial insurance or patient assistance if you have Medicare or no insurance.    Which vaccines are recommended for me? Talk to your doctor about these vaccines: Flu, Coronavirus (COVID-19), Pneumococcal (pneumonia), Tdap, Hepatitis B, Zoster (shingles)           Adherence, Self-Administration, and Current Therapy Problems  Adherence related to the patient's specialty therapy was discussed with the patient. The Adherence segment of this outreach has been reviewed and updated.     Is there a concern with patient's ability to self administer the medication correctly and without issue?: No  Were any potential barriers to adherence identified during the initial assessment or patient education?: No  Are there any concerns regarding the patient's understanding of the importance of medication adherence?: No    Adherence Questions  Linked Medication(s) Assessed: Onabotulinumtoxina, Zavegepant Hcl  On average, how many doses/injections does the patient miss per month?: 0  What are the identified reasons for non-adherence or missed doses? : no problems identified  What is the estimated medication adherence level?: %  Based on the patient/caregiver response and refill history, does this patient require an MTP to track adherence improvements?:  no    Additional Barriers to Patient Self-Administration: none  Methods for Supporting Patient Self-Administration: none    Recently Close Medication Therapy Problems  No medication therapy recommendations to display  Open Medication Therapy Problems  No medication therapy recommendations to display     Adverse Drug Reactions  Medication tolerability: Tolerating with no to minimal ADRs          Medication plan: Continue therapy with normal follow-up  Plan for ADR Management: not required    Goals of Therapy  Goals related to the patient's specialty therapy were discussed with the patient. The Patient Goals segment of this outreach has been reviewed and updated.   Goals Addressed Today        Specialty Pharmacy General Goal      Decrease frequency, severity and duration of migraine attacks (Current headache frequency is approximately 6-8 headache days in a month with some headaches lasting for more than 4 hours.)                 Quality of Life Assessment   Quality of Life related to the patient's enrollment in the patient management program and services provided was discussed with the patient. The QOL segment of this outreach has been reviewed and updated.   Quality of Life Improvement Scale: 8-Moderately better    Reassessment Plan & Follow-Up  Medication Therapy Changes: Emgality 120 mg subcutaneously monthly, Qulipta 60 mg PO daily, Continue Zavzpret as needed, continue Botox 155 units IM to be administered by Provider in office every 3 months  Related Plans, Therapy Recommendations, or Issues to Be Addressed: none   Pharmacist to perform regular reassessments no more than (6) months from the previous assessment.  Care Coordinator to set up future refill outreaches, coordinate prescription delivery, and escalate clinical questions to pharmacist.     Attestation  Therapeutic appropriateness: Appropriate  I attest the patient was actively involved in and has agreed to the above plan of care. If the prescribed  therapy is at any point deemed not appropriate based on the current or future assessments, a consultation will be initiated with the patient's specialty care provider to determine the best course of action. The revised plan of therapy will be documented along with any additional patient education provided. Discussed aforementioned material with patient via telemedicine.    Berta Davis, PharmD, [facility]  Clinic Specialty Pharmacist, Neurology  1/8/2024  10:05 EST

## 2024-01-15 ENCOUNTER — SPECIALTY PHARMACY (OUTPATIENT)
Dept: NEUROLOGY | Facility: CLINIC | Age: 49
End: 2024-01-15
Payer: COMMERCIAL

## 2024-01-15 DIAGNOSIS — M51.36 BULGING OF LUMBAR INTERVERTEBRAL DISC: ICD-10-CM

## 2024-01-15 DIAGNOSIS — G89.29 CHRONIC MIDLINE LOW BACK PAIN WITH BILATERAL SCIATICA: Primary | ICD-10-CM

## 2024-01-15 DIAGNOSIS — M54.42 CHRONIC MIDLINE LOW BACK PAIN WITH BILATERAL SCIATICA: Primary | ICD-10-CM

## 2024-01-15 DIAGNOSIS — M54.41 CHRONIC MIDLINE LOW BACK PAIN WITH BILATERAL SCIATICA: Primary | ICD-10-CM

## 2024-01-15 NOTE — PROGRESS NOTES
Specialty Pharmacy Refill Coordination Note     Molly is a 48 y.o. female contacted today regarding refills of Zavzpret specialty medication(s).    Reviewed and verified with patient:       Specialty medication(s) and dose(s) confirmed: yes    Refill Questions      Flowsheet Row Most Recent Value   Changes to allergies? No   Changes to medications? No   New conditions or infections since last clinic visit No   Unplanned office visit, urgent care, ED, or hospital admission in the last 4 weeks  No   How does patient/caregiver feel medication is working? Very good   Financial problems or insurance changes  No   Since the previous refill, were any specialty medication doses or scheduled injections missed or delayed?  No   Does this patient require a clinical escalation to a pharmacist? No            Delivery Questions      Flowsheet Row Most Recent Value   Delivery method [employer]   Delivery address verified with patient/caregiver? Yes   Delivery address Home   Number of medications in delivery 1   Medication(s) being filled and delivered Zavegepant Hcl   Doses left of specialty medications 0   Copay verified? Yes   Copay amount 0.00   Copay form of payment No copayment ($0)                   Follow-up: 28 day(s)     Kay Mendes, Pharmacy Technician  Specialty Pharmacy Technician

## 2024-01-16 ENCOUNTER — OFFICE VISIT (OUTPATIENT)
Dept: FAMILY MEDICINE CLINIC | Facility: CLINIC | Age: 49
End: 2024-01-16
Payer: COMMERCIAL

## 2024-01-16 VITALS
DIASTOLIC BLOOD PRESSURE: 98 MMHG | SYSTOLIC BLOOD PRESSURE: 142 MMHG | OXYGEN SATURATION: 98 % | BODY MASS INDEX: 31.41 KG/M2 | HEIGHT: 60 IN | WEIGHT: 160 LBS | HEART RATE: 90 BPM

## 2024-01-16 DIAGNOSIS — M54.41 CHRONIC MIDLINE LOW BACK PAIN WITH BILATERAL SCIATICA: Primary | ICD-10-CM

## 2024-01-16 DIAGNOSIS — M54.42 CHRONIC MIDLINE LOW BACK PAIN WITH BILATERAL SCIATICA: Primary | ICD-10-CM

## 2024-01-16 DIAGNOSIS — M51.36 DEGENERATIVE DISC DISEASE, LUMBAR: ICD-10-CM

## 2024-01-16 DIAGNOSIS — G89.29 CHRONIC MIDLINE LOW BACK PAIN WITH BILATERAL SCIATICA: Primary | ICD-10-CM

## 2024-01-16 PROBLEM — M51.369 DEGENERATIVE DISC DISEASE, LUMBAR: Status: ACTIVE | Noted: 2024-01-16

## 2024-01-16 PROCEDURE — 99213 OFFICE O/P EST LOW 20 MIN: CPT | Performed by: PHYSICIAN ASSISTANT

## 2024-01-16 NOTE — PROGRESS NOTES
"Chief Complaint  Imaging Only (To discuss results )    Subjective          Molly Ferguson presents to [facility]    History of Present Illness  patient is here to follow up on her back pain after she had her MRI to discuss those results and next step in treatment.  She continues to experience back pain on a daily basis.      Objective   Vital Signs:   /98 (BP Location: Left arm, Patient Position: Sitting, Cuff Size: Adult)   Pulse 90   Ht 152.4 cm (60\")   Wt 72.6 kg (160 lb)   SpO2 98%   BMI 31.25 kg/m²     Body mass index is 31.25 kg/m².    Review of Systems   Constitutional:  Negative for fever and unexpected weight loss.   Cardiovascular:  Negative for chest pain.   Gastrointestinal:  Negative for abdominal pain.   Genitourinary:  Negative for dysuria, pelvic pain and urinary incontinence.   Musculoskeletal:  Positive for back pain.   Neurological:  Negative for weakness and numbness.       Past History:  Medical History: has a past medical history of Anxiety, Depression, Depressive disorder, GERD (gastroesophageal reflux disease), History of anxiety, History of hyperlipidemia, Low back pain, Migraines, Obesity, Patellar malalignment syndrome of left knee, PONV (postoperative nausea and vomiting), Tennis elbow, Trigger thumb, and Visual disturbances.   Surgical History: has a past surgical history that includes Tubal ligation; Hysterectomy; Shoulder surgery (Left, 2020);  section; Colonoscopy; Esophagogastroduodenoscopy; Tennis Elbow Release (Left, 11/10/2020); Essure tubal ligation; and Joint replacement (2023).   Family History: family history includes Alzheimer's disease in some other family members; Arthritis in her father; Breast cancer in her maternal grandmother; COPD in her father; Cancer in an other family member; Dementia in her maternal grandmother; Diabetes in her father; Heart disease in her father and another family member; Hyperlipidemia " in her father; Hypertension in her father and maternal grandmother; Parkinsonism in her paternal grandfather and another family member; Skin cancer in her mother.   Social History: reports that she quit smoking about 8 months ago. Her smoking use included cigarettes. She has a 5.00 pack-year smoking history. She has never used smokeless tobacco. She reports that she does not currently use alcohol. She reports that she does not use drugs.      Current Outpatient Medications:     Atogepant (Qulipta) 60 MG tablet, Take 1 tablet by mouth Daily., Disp: 30 tablet, Rfl: 11    buPROPion XL (WELLBUTRIN XL) 300 MG 24 hr tablet, TAKE 1 TABLET BY MOUTH DAILY, Disp: 90 tablet, Rfl: 0    busPIRone (BUSPAR) 10 MG tablet, Take 1 tablet by mouth Daily., Disp: , Rfl:     cyclobenzaprine (FLEXERIL) 10 MG tablet, Take 1 tablet by mouth 2 (Two) Times a Day., Disp: , Rfl:     docusate sodium 250 MG capsule, As Needed., Disp: , Rfl:     Emgality 120 MG/ML solution prefilled syringe, Inject 1 mL under the skin into the appropriate area as directed Every 30 (Thirty) Days., Disp: 1 mL, Rfl: 11    gabapentin (NEURONTIN) 600 MG tablet, TAKE 2 TABLETS BY MOUTH EVERY MORNING, ONE TABLET BY MOUTH AT NOON AND TAKE TWO TABLETS BY MOUTH ONCE NIGHTLY, Disp: 150 tablet, Rfl: 0    hydrOXYzine pamoate (VISTARIL) 50 MG capsule, TAKE ONE CAPSULE BY MOUTH EVERY 8 HOURS AS NEEDED FOR ANXIETY (Patient taking differently: Take 1 capsule by mouth Daily.), Disp: 90 capsule, Rfl: 1    nabumetone (Relafen) 750 MG tablet, Take 1 tablet by mouth 2 (Two) Times a Day., Disp: 60 tablet, Rfl: 1    OnabotulinumtoxinA 200 units reconstituted solution, Inject 200 units IM into head neck shoulders every 12 weeks per FDA protocol Dx G43.719 SHIP TO SANDRITA HOLDEN., Disp: 1 each, Rfl: 3    ondansetron (ZOFRAN) 4 MG tablet, TAKE 1 TABLET BY MOUTH EVERY 8 HOURS AS NEEDED FOR NAUSEA AND/OR VOMITING, Disp: 18 tablet, Rfl: 1    pantoprazole (PROTONIX) 40 MG EC tablet, Take 1  tablet by mouth Daily., Disp: 90 tablet, Rfl: 2    saccharomyces boulardii (FLORASTOR) 250 MG capsule, Take 1 capsule by mouth Daily., Disp: , Rfl:     Semaglutide-Weight Management (Wegovy) 1 MG/0.5ML solution auto-injector, INJECT 1 MG UNDER THE SKIN ONCE WEEKLY, Disp: 2 mL, Rfl: 3    SUMAtriptan (IMITREX) 100 MG tablet, TAKE ONE TABLET BY MOUTH AT ONSET OF HEADACHE; MAY REPEAT ONE TABLET IN 2 HOURS IF NEEDED. MAX OF 200MG A DAY, Disp: 9 tablet, Rfl: 0    SUMAtriptan Succinate (IMITREX) 6 MG/0.5ML injection, INJECT PRESCRIBED DOSE AT ONSET OF HEADACHE. MAY REPEAT DOSE ONE TIME IN 1 HOUR IF HEADACHE NOT RELIEVED, Disp: 1 mL, Rfl: 6    traZODone (DESYREL) 100 MG tablet, TAKE ONE TABLET BY MOUTH ONCE NIGHTLY, Disp: 90 tablet, Rfl: 1    Trokendi  MG capsule sustained-release 24 hr, TAKE ONE CAPSULE BY MOUTH DAILY, Disp: 90 capsule, Rfl: 2    ubrogepant (UBRELVY) 50 MG tablet, Take 2 tablets by mouth As Needed (As needed) for up to 1 dose., Disp: 10 tablet, Rfl: 3    vilazodone (VIIBRYD) 20 MG tablet tablet, , Disp: , Rfl:     Zavegepant HCl (Zavzpret) 10 MG/ACT solution, Administer 1 spray in nostril As Needed for migraine. Maximum of one spray (10 mg) per 24 hours., Disp: 6 each, Rfl: 3    Allergies: Codeine, Nickel, Penicillins, Morphine, and Morphine and related    Physical Exam  Constitutional:       Appearance: Normal appearance.   Musculoskeletal:      Lumbar back: Spasms and tenderness present.      Comments: Pain with movement    Neurological:      General: No focal deficit present.      Mental Status: She is alert and oriented to person, place, and time.   Psychiatric:         Mood and Affect: Mood normal.          Result Review :                   Assessment and Plan    Diagnoses and all orders for this visit:    1. Chronic midline low back pain with bilateral sciatica (Primary)  Assessment & Plan:  JANAE showing DDD of Lumbar spine with disc bulging nad nerve root compression at L5-S1.   I am referring  her to neurosurgery for further evaluation and treatment options.  She has not responded to physical therapy and conservative treatments.       2. Degenerative disc disease, lumbar        Follow Up   Return if symptoms worsen or fail to improve.  Patient was given instructions and counseling regarding her condition or for health maintenance advice. Please see specific information pulled into the AVS if appropriate.     Andreia Vargas PA-C  Answers submitted by the patient for this visit:  Other (Submitted on 12/28/2023)  Onset: more than 1 year ago  Primary Reason for Visit (Submitted on 1/9/2024)  What is the primary reason for your visit?: Back Pain

## 2024-01-16 NOTE — ASSESSMENT & PLAN NOTE
JANAE showing DDD of Lumbar spine with disc bulging nad nerve root compression at L5-S1.   I am referring her to neurosurgery for further evaluation and treatment options.  She has not responded to physical therapy and conservative treatments.

## 2024-01-25 ENCOUNTER — SPECIALTY PHARMACY (OUTPATIENT)
Dept: NEUROLOGY | Facility: CLINIC | Age: 49
End: 2024-01-25
Payer: COMMERCIAL

## 2024-01-25 NOTE — PROGRESS NOTES
Specialty Pharmacy Refill Coordination Note     Molly is a 48 y.o. female contacted today regarding refills of Emgality specialty medication(s). Patient due for next injection on 2/2/24.    Reviewed and verified with patient:       Specialty medication(s) and dose(s) confirmed: yes    Refill Questions      Flowsheet Row Most Recent Value   Changes to allergies? No   Changes to medications? No   New conditions or infections since last clinic visit No   Unplanned office visit, urgent care, ED, or hospital admission in the last 4 weeks  No   How does patient/caregiver feel medication is working? Very good   Financial problems or insurance changes  No   Since the previous refill, were any specialty medication doses or scheduled injections missed or delayed?  No   Does this patient require a clinical escalation to a pharmacist? No            Delivery Questions      Flowsheet Row Most Recent Value   Delivery method FedEx   Delivery address verified with patient/caregiver? Yes   Delivery address Home   Number of medications in delivery 1   Medication(s) being filled and delivered Galcanezumab-Gnlm   Doses left of specialty medications 0   Copay verified? Yes   Copay amount 0.00   Copay form of payment No copayment ($0)                   Follow-up: 28 day(s)     Kay Mendes, Pharmacy Technician  Specialty Pharmacy Technician

## 2024-02-04 DIAGNOSIS — G43.719 INTRACTABLE CHRONIC MIGRAINE WITHOUT AURA AND WITHOUT STATUS MIGRAINOSUS: ICD-10-CM

## 2024-02-05 DIAGNOSIS — G43.719 INTRACTABLE CHRONIC MIGRAINE WITHOUT AURA AND WITHOUT STATUS MIGRAINOSUS: ICD-10-CM

## 2024-02-05 RX ORDER — TRAZODONE HYDROCHLORIDE 100 MG/1
TABLET ORAL
Qty: 90 TABLET | Refills: 1 | Status: SHIPPED | OUTPATIENT
Start: 2024-02-05

## 2024-02-05 RX ORDER — BUPROPION HYDROCHLORIDE 300 MG/1
300 TABLET ORAL DAILY
Qty: 90 TABLET | Refills: 0 | Status: SHIPPED | OUTPATIENT
Start: 2024-02-05

## 2024-02-05 RX ORDER — SUMATRIPTAN 100 MG/1
TABLET, FILM COATED ORAL
Qty: 9 TABLET | Refills: 0 | Status: SHIPPED | OUTPATIENT
Start: 2024-02-05

## 2024-02-05 RX ORDER — TRAZODONE HYDROCHLORIDE 100 MG/1
TABLET ORAL
Qty: 90 TABLET | Refills: 1 | OUTPATIENT
Start: 2024-02-05

## 2024-02-05 RX ORDER — GABAPENTIN 600 MG/1
TABLET ORAL
Qty: 150 TABLET | Refills: 0 | Status: SHIPPED | OUTPATIENT
Start: 2024-02-05

## 2024-02-05 NOTE — TELEPHONE ENCOUNTER
Rx Refill Note  Requested Prescriptions     Pending Prescriptions Disp Refills    SUMAtriptan (IMITREX) 100 MG tablet [Pharmacy Med Name: SUMATRIPTAN SUCC 100 MG TABLET] 9 tablet 0     Sig: TAKE ONE TABLET BY MOUTH AT ONSET OF HEADACHE; MAY REPEAT ONE TABLET IN 2 HOURS IF NEEDED. MAX OF 200MG A DAY      Last filled: 1/4/24 #9 + 0 refills  Last office visit with prescribing clinician: 12/13/23 Botox  Next office visit with prescribing clinician: 3/13/23    Luis Jesus MA  02/05/24, 14:42 EST

## 2024-02-05 NOTE — TELEPHONE ENCOUNTER
Rx Refill Note  Requested Prescriptions     Pending Prescriptions Disp Refills    gabapentin (NEURONTIN) 600 MG tablet 150 tablet 0     Sig: TAKE 2 TABLETS BY MOUTH EVERY MORNING, ONE TABLET BY MOUTH AT NOON AND TAKE TWO TABLETS BY MOUTH ONCE NIGHTLY      Last filled: 1/5/24 30 days + 0 refills by chase Hamm for Dr. Del Valle    Last office visit with prescribing clinician: 12/13/23 Botox  Next office visit with prescribing clinician: 3/13/2024     Luis Jesus MA  02/05/24, 14:58 EST

## 2024-02-06 ENCOUNTER — SPECIALTY PHARMACY (OUTPATIENT)
Dept: NEUROLOGY | Facility: CLINIC | Age: 49
End: 2024-02-06
Payer: COMMERCIAL

## 2024-02-24 ENCOUNTER — PATIENT MESSAGE (OUTPATIENT)
Dept: NEUROLOGY | Facility: CLINIC | Age: 49
End: 2024-02-24
Payer: COMMERCIAL

## 2024-02-26 RX ORDER — TOPIRAMATE 200 MG/1
1 CAPSULE, EXTENDED RELEASE ORAL DAILY
Qty: 90 CAPSULE | Refills: 0 | Status: SHIPPED | OUTPATIENT
Start: 2024-02-26

## 2024-02-26 NOTE — TELEPHONE ENCOUNTER
From: Molly Ferguson  To: Darrell Del Valle  Sent: 2/24/2024 10:30 AM EST  Subject: Trokendi    Good morning! I am almost out of my Trokendi but I can't find my prescription at [facility]. Can a prescription be called into [facility] for this med?    Thanks  Molly

## 2024-02-28 ENCOUNTER — SPECIALTY PHARMACY (OUTPATIENT)
Dept: NEUROLOGY | Facility: CLINIC | Age: 49
End: 2024-02-28
Payer: COMMERCIAL

## 2024-02-28 NOTE — PROGRESS NOTES
Specialty Pharmacy Refill Coordination Note     Molly is a 48 y.o. female contacted today regarding refills of  Emgality due 3/1 (takes every 25-28 days) specialty medication(s).    Reviewed and verified with patient:       Specialty medication(s) and dose(s) confirmed: yes    Refill Questions      Flowsheet Row Most Recent Value   Changes to allergies? No   Changes to medications? No   New conditions or infections since last clinic visit No   Unplanned office visit, urgent care, ED, or hospital admission in the last 4 weeks  No   How does patient/caregiver feel medication is working? Very good   Financial problems or insurance changes  No   Since the previous refill, were any specialty medication doses or scheduled injections missed or delayed?  No   Does this patient require a clinical escalation to a pharmacist? No            Delivery Questions      Flowsheet Row Most Recent Value   Delivery method FedEx   Delivery address verified with patient/caregiver? Yes   Delivery address Home   Number of medications in delivery 1   Medication(s) being filled and delivered Galcanezumab-Gnlm   Doses left of specialty medications Emgality due and will take 3/1 (try to keep as close to every 25-28 days as possible)   Copay verified? Yes   Copay amount $0 Emgality last fill with insurance/copay card---consent obtained for potential copay differences   Copay form of payment No copayment ($0)                   Follow-up: 1 month(s)     Berta Davis, JeovannyD

## 2024-03-05 ENCOUNTER — SPECIALTY PHARMACY (OUTPATIENT)
Dept: NEUROLOGY | Facility: CLINIC | Age: 49
End: 2024-03-05
Payer: COMMERCIAL

## 2024-03-05 NOTE — PROGRESS NOTES
Specialty Pharmacy Refill Coordination Note     Molly is a 48 y.o. female contacted today regarding refills of  Qulipta specialty medication(s).    Reviewed and verified with patient:       Specialty medication(s) and dose(s) confirmed: yes    Refill Questions      Flowsheet Row Most Recent Value   Changes to allergies? No   Changes to medications? No   New conditions or infections since last clinic visit No   Unplanned office visit, urgent care, ED, or hospital admission in the last 4 weeks  No   How does patient/caregiver feel medication is working? Very good   Financial problems or insurance changes  No   Since the previous refill, were any specialty medication doses or scheduled injections missed or delayed?  No   Does this patient require a clinical escalation to a pharmacist? No            Delivery Questions      Flowsheet Row Most Recent Value   Delivery method FedEx   Delivery address verified with patient/caregiver? Yes   Delivery address Home   Number of medications in delivery 1   Medication(s) being filled and delivered Atogepant   Doses left of specialty medications 2-3   Copay verified? Yes   Copay amount $0 insurance/copay card   Copay form of payment No copayment ($0)                   Follow-up: 1 month(s)     Berta Davis, JeovannyD

## 2024-03-07 ENCOUNTER — SPECIALTY PHARMACY (OUTPATIENT)
Dept: NEUROLOGY | Facility: CLINIC | Age: 49
End: 2024-03-07
Payer: COMMERCIAL

## 2024-03-07 NOTE — PROGRESS NOTES
Specialty Pharmacy Refill Coordination Note     Molly is a 48 y.o. female contacted today regarding refills of Botox specialty medication(s). This refill of Botox is for the appointment scheduled on 3/13/24.     Reviewed and verified with patient:       Specialty medication(s) and dose(s) confirmed: yes    Refill Questions      Flowsheet Row Most Recent Value   Changes to allergies? No   Changes to medications? No   New conditions or infections since last clinic visit No   Unplanned office visit, urgent care, ED, or hospital admission in the last 4 weeks  No   How does patient/caregiver feel medication is working? Very good   Financial problems or insurance changes  No   Since the previous refill, were any specialty medication doses or scheduled injections missed or delayed?  No   Does this patient require a clinical escalation to a pharmacist? No            Delivery Questions      Flowsheet Row Most Recent Value   Delivery method FedEx   Delivery address verified with patient/caregiver? Yes   Delivery address Other (Comment)  [Office: [address]   Number of medications in delivery 1   Medication(s) being filled and delivered Onabotulinumtoxina   Doses left of specialty medications 0   Copay verified? Yes   Copay amount Change [facility] Offline on (3/7/24) SpRx ERX down. Confirmed expected copay of: $0.00 based on most recent unaffected dispense history (12/6/23)   Copay form of payment No copayment ($0)                   Follow-up: 84  day(s)     Kay Mendes, Pharmacy Technician  Specialty Pharmacy Technician

## 2024-03-13 ENCOUNTER — PROCEDURE VISIT (OUTPATIENT)
Dept: NEUROLOGY | Facility: CLINIC | Age: 49
End: 2024-03-13
Payer: COMMERCIAL

## 2024-03-13 DIAGNOSIS — G43.719 INTRACTABLE CHRONIC MIGRAINE WITHOUT AURA AND WITHOUT STATUS MIGRAINOSUS: Primary | ICD-10-CM

## 2024-03-13 NOTE — PROGRESS NOTES
CC:  Migraines.    HPI : Patient is in clinic for Botox injection.  Current headache frequency is approximately 6-8 headache days in a month with some headaches lasting for more than 4 hours.      Botox Procedure Note    Current headache frequency: 6-8 headaches days / month.    The risks and benefits were discussed with the patient. The patient was given the opportunity to ask questions. Informed consent form was signed.    Onabotulinumtoxin A was reconstituted with 0.9% normal saline. All injections sites were prepped with alcohol swab. Approximately 5 units of botox were injected into each of the following sites bilaterally as per routine migraine botox injection PREEMPT protocol: , procerus, frontalis, temporalis, occipitalis, upper cervical paraspinals, and trapezius.    The patient tolerated the procedure well. There were no immediate complications. The patient will follow up in approximately 12 weeks for her next injection.    Total amount of onabotulinumtoxin A injected was 155 units with 45 units wasted.    Note: Insurance did not approve Qulipta so now she is taking Emgality once a month injection.  She has tried and failed Ubrelvy.  She did try Zavzpret sample and it did work as an abortive treatment.  I have given her Zavzpret co-pay card so that she can get it from her pharmacy for future use.  Otherwise, I will see her back in clinic in 12 weeks for next injection.

## 2024-03-19 ENCOUNTER — OFFICE VISIT (OUTPATIENT)
Dept: FAMILY MEDICINE CLINIC | Facility: CLINIC | Age: 49
End: 2024-03-19
Payer: COMMERCIAL

## 2024-03-19 VITALS
BODY MASS INDEX: 31.8 KG/M2 | OXYGEN SATURATION: 99 % | DIASTOLIC BLOOD PRESSURE: 84 MMHG | HEIGHT: 60 IN | SYSTOLIC BLOOD PRESSURE: 124 MMHG | WEIGHT: 162 LBS | HEART RATE: 93 BPM

## 2024-03-19 DIAGNOSIS — G43.719 INTRACTABLE CHRONIC MIGRAINE WITHOUT AURA AND WITHOUT STATUS MIGRAINOSUS: ICD-10-CM

## 2024-03-19 DIAGNOSIS — M51.36 DEGENERATIVE DISC DISEASE, LUMBAR: Primary | ICD-10-CM

## 2024-03-19 RX ORDER — METHYLPREDNISOLONE 4 MG/1
TABLET ORAL
Qty: 21 TABLET | Refills: 0 | Status: SHIPPED | OUTPATIENT
Start: 2024-03-19

## 2024-03-19 RX ORDER — CYCLOSPORINE 0 G/ML
SOLUTION/ DROPS OPHTHALMIC; TOPICAL
COMMUNITY
Start: 2024-03-08

## 2024-03-19 RX ORDER — DICLOFENAC SODIUM 75 MG/1
75 TABLET, DELAYED RELEASE ORAL 2 TIMES DAILY
Qty: 90 TABLET | Refills: 1 | Status: SHIPPED | OUTPATIENT
Start: 2024-03-19

## 2024-03-19 RX ORDER — SPIRONOLACTONE 50 MG/1
TABLET, FILM COATED ORAL
COMMUNITY
Start: 2024-02-29

## 2024-03-19 RX ORDER — TRIAMCINOLONE ACETONIDE 40 MG/ML
80 INJECTION, SUSPENSION INTRA-ARTICULAR; INTRAMUSCULAR ONCE
Status: COMPLETED | OUTPATIENT
Start: 2024-03-19 | End: 2024-03-19

## 2024-03-19 RX ORDER — KETOROLAC TROMETHAMINE 30 MG/ML
60 INJECTION, SOLUTION INTRAMUSCULAR; INTRAVENOUS
Status: COMPLETED | OUTPATIENT
Start: 2024-03-19 | End: 2024-03-19

## 2024-03-19 RX ORDER — SUMATRIPTAN 100 MG/1
TABLET, FILM COATED ORAL
Qty: 9 TABLET | Refills: 0 | Status: SHIPPED | OUTPATIENT
Start: 2024-03-19

## 2024-03-19 RX ADMIN — KETOROLAC TROMETHAMINE 60 MG: 30 INJECTION, SOLUTION INTRAMUSCULAR; INTRAVENOUS at 08:54

## 2024-03-19 RX ADMIN — TRIAMCINOLONE ACETONIDE 80 MG: 40 INJECTION, SUSPENSION INTRA-ARTICULAR; INTRAMUSCULAR at 08:55

## 2024-03-19 NOTE — TELEPHONE ENCOUNTER
Rx Refill Note  Requested Prescriptions     Pending Prescriptions Disp Refills    SUMAtriptan (IMITREX) 100 MG tablet [Pharmacy Med Name: SUMATRIPTAN SUCC 100 MG TABLET] 9 tablet 0     Sig: TAKE 1 TABLET BY MOUTH AT ONSET OF HEADACHE; MAY REPEAT 1 TABLET IN 2 HOURS IF NEEDED. MAX OF 2 TABLETS PER DAY      Last filled: 2/5/24 #9 tabs with 0 refills    Last office visit with prescribing clinician: 3/13/24 procedure visit     Next office visit with prescribing clinician: 6/12/2024     Luis Jesus MA  03/19/24, 10:38 EDT

## 2024-03-19 NOTE — PROGRESS NOTES
"Chief Complaint  Back Pain    Subjective          Molly Ferguson presents to [facility]    Back Pain  Pertinent negatives include no abdominal pain, bladder incontinence, chest pain, dysuria, fever, numbness, pelvic pain, weakness or weight loss.       Objective   Vital Signs:   /84 (BP Location: Left arm, Patient Position: Sitting, Cuff Size: Adult)   Pulse 93   Ht 152.4 cm (60\")   Wt 73.5 kg (162 lb)   SpO2 99%   BMI 31.64 kg/m²     Body mass index is 31.64 kg/m².    Review of Systems   Constitutional:  Negative for fever and unexpected weight loss.   Cardiovascular:  Negative for chest pain.   Gastrointestinal:  Negative for abdominal pain.   Genitourinary:  Negative for dysuria, pelvic pain and urinary incontinence.   Musculoskeletal:  Positive for back pain.   Neurological:  Negative for weakness and numbness.       Past History:  Medical History: has a past medical history of Anxiety, Depression, Depressive disorder, GERD (gastroesophageal reflux disease), History of anxiety, History of hyperlipidemia, Low back pain, Migraines, Obesity, Patellar malalignment syndrome of left knee, PONV (postoperative nausea and vomiting), Tennis elbow, Trigger thumb, and Visual disturbances.   Surgical History: has a past surgical history that includes Tubal ligation; Hysterectomy; Shoulder surgery (Left, 2020);  section; Colonoscopy; Esophagogastroduodenoscopy; Tennis Elbow Release (Left, 11/10/2020); Essure tubal ligation; and Joint replacement (2023).   Family History: family history includes Alzheimer's disease in some other family members; Arthritis in her father; Breast cancer in her maternal grandmother; COPD in her father; Cancer in an other family member; Dementia in her maternal grandmother; Diabetes in her father; Heart disease in her father and another family member; Hyperlipidemia in her father; Hypertension in her father and maternal grandmother; " Parkinsonism in her paternal grandfather and another family member; Skin cancer in her mother.   Social History: reports that she quit smoking about 10 months ago. Her smoking use included cigarettes. She started smoking about 5 years ago. She has a 5 pack-year smoking history. She has never used smokeless tobacco. She reports that she does not currently use alcohol. She reports that she does not use drugs.      Current Outpatient Medications:     Atogepant (Qulipta) 60 MG tablet, Take 1 tablet by mouth Daily., Disp: 30 tablet, Rfl: 11    buPROPion XL (WELLBUTRIN XL) 300 MG 24 hr tablet, TAKE 1 TABLET BY MOUTH DAILY, Disp: 90 tablet, Rfl: 0    busPIRone (BUSPAR) 10 MG tablet, Take 1 tablet by mouth Daily., Disp: , Rfl:     Cequa 0.09 % solution, INSTILL 1 DROP IN BOTH EYES TWICE A DAY, Disp: , Rfl:     cyclobenzaprine (FLEXERIL) 10 MG tablet, Take 1 tablet by mouth 2 (Two) Times a Day., Disp: , Rfl:     docusate sodium 250 MG capsule, As Needed., Disp: , Rfl:     Emgality 120 MG/ML solution prefilled syringe, Inject 1 mL under the skin into the appropriate area as directed Every 30 (Thirty) Days., Disp: 1 mL, Rfl: 11    gabapentin (NEURONTIN) 600 MG tablet, TAKE 2 TABLETS BY MOUTH EVERY MORNING, ONE TABLET BY MOUTH AT NOON AND TAKE TWO TABLETS BY MOUTH ONCE NIGHTLY, Disp: 150 tablet, Rfl: 0    hydrOXYzine pamoate (VISTARIL) 50 MG capsule, TAKE ONE CAPSULE BY MOUTH EVERY 8 HOURS AS NEEDED FOR ANXIETY (Patient taking differently: Take 1 capsule by mouth Daily.), Disp: 90 capsule, Rfl: 1    OnabotulinumtoxinA 200 units reconstituted solution, Inject 200 units IM into head neck shoulders every 12 weeks per FDA protocol Dx G43.719 SHIP TO SANDRITA HOLDEN., Disp: 1 each, Rfl: 3    ondansetron (ZOFRAN) 4 MG tablet, TAKE 1 TABLET BY MOUTH EVERY 8 HOURS AS NEEDED FOR NAUSEA AND/OR VOMITING, Disp: 18 tablet, Rfl: 1    pantoprazole (PROTONIX) 40 MG EC tablet, Take 1 tablet by mouth Daily., Disp: 90 tablet, Rfl: 2     saccharomyces boulardii (FLORASTOR) 250 MG capsule, Take 1 capsule by mouth Daily., Disp: , Rfl:     spironolactone (ALDACTONE) 50 MG tablet, , Disp: , Rfl:     SUMAtriptan (IMITREX) 100 MG tablet, TAKE ONE TABLET BY MOUTH AT ONSET OF HEADACHE; MAY REPEAT ONE TABLET IN 2 HOURS IF NEEDED. MAX OF 200MG A DAY, Disp: 9 tablet, Rfl: 0    SUMAtriptan Succinate (IMITREX) 6 MG/0.5ML injection, INJECT PRESCRIBED DOSE AT ONSET OF HEADACHE. MAY REPEAT DOSE ONE TIME IN 1 HOUR IF HEADACHE NOT RELIEVED, Disp: 1 mL, Rfl: 6    Topiramate ER (Trokendi XR) 200 MG capsule sustained-release 24 hr, Take 1 capsule by mouth Daily., Disp: 90 capsule, Rfl: 0    traZODone (DESYREL) 100 MG tablet, TAKE ONE TABLET BY MOUTH ONCE NIGHTLY, Disp: 90 tablet, Rfl: 1    ubrogepant (UBRELVY) 50 MG tablet, Take 2 tablets by mouth As Needed (As needed) for up to 1 dose., Disp: 10 tablet, Rfl: 3    vilazodone (VIIBRYD) 20 MG tablet tablet, , Disp: , Rfl:     Zavegepant HCl (Zavzpret) 10 MG/ACT solution, Administer 1 spray in nostril As Needed for migraine. Maximum of one spray (10 mg) per 24 hours., Disp: 6 each, Rfl: 3    diclofenac (VOLTAREN) 75 MG EC tablet, Take 1 tablet by mouth 2 (Two) Times a Day., Disp: 90 tablet, Rfl: 1    methylPREDNISolone (MEDROL) 4 MG dose pack, Take as directed on package instructions., Disp: 21 tablet, Rfl: 0    Current Facility-Administered Medications:     ketorolac (TORADOL) injection 60 mg, 60 mg, Intramuscular, One-Time Injection, Andreia Vargas PA-C    OnabotulinumtoxinA 200 Units, 200 Units, Intramuscular, Q3 Months, Darrell Del Valle MD, 200 Units at 03/13/24 0903    triamcinolone acetonide (KENALOG-40) injection 80 mg, 80 mg, Intramuscular, Once, Andreia Vargas PA-C    Allergies: Codeine, Nickel, Penicillins, Morphine, and Morphine and related    Physical Exam  Constitutional:       Appearance: Normal appearance.   Musculoskeletal:      Lumbar back: Spasms and tenderness present.      Comments: Pain with movement     Neurological:      General: No focal deficit present.      Mental Status: She is alert and oriented to person, place, and time.   Psychiatric:         Mood and Affect: Mood normal.          Result Review :                   Assessment and Plan    Diagnoses and all orders for this visit:    1. Degenerative disc disease, lumbar (Primary)  Assessment & Plan:  I am going to give patient a steroid and Toradol shot in the office today and then put her on a drawl Dosepak and switch her anti-inflammatory to diclofenac she said the nabumetone was helping she ran out but it was not helping much and she would like to try something different.  She will keep her upcoming appointment with the neurosurgeon.  I told her she could return if this was ineffective and we could discuss other options.    Orders:  -     ketorolac (TORADOL) injection 60 mg  -     triamcinolone acetonide (KENALOG-40) injection 80 mg    Other orders  -     diclofenac (VOLTAREN) 75 MG EC tablet; Take 1 tablet by mouth 2 (Two) Times a Day.  Dispense: 90 tablet; Refill: 1  -     methylPREDNISolone (MEDROL) 4 MG dose pack; Take as directed on package instructions.  Dispense: 21 tablet; Refill: 0        Follow Up   Return if symptoms worsen or fail to improve.  Patient was given instructions and counseling regarding her condition or for health maintenance advice. Please see specific information pulled into the AVS if appropriate.     Andreia Vargas PA-C  Answers submitted by the patient for this visit:  Primary Reason for Visit (Submitted on 3/12/2024)  What is the primary reason for your visit?: Back Pain

## 2024-03-19 NOTE — ASSESSMENT & PLAN NOTE
I am going to give patient a steroid and Toradol shot in the office today and then put her on a drawl Dosepak and switch her anti-inflammatory to diclofenac she said the nabumetone was helping she ran out but it was not helping much and she would like to try something different.  She will keep her upcoming appointment with the neurosurgeon.  I told her she could return if this was ineffective and we could discuss other options.

## 2024-03-22 ENCOUNTER — TELEPHONE (OUTPATIENT)
Dept: NEUROSURGERY | Facility: CLINIC | Age: 49
End: 2024-03-22

## 2024-03-22 ENCOUNTER — OFFICE VISIT (OUTPATIENT)
Dept: NEUROSURGERY | Facility: CLINIC | Age: 49
End: 2024-03-22
Payer: COMMERCIAL

## 2024-03-22 VITALS
DIASTOLIC BLOOD PRESSURE: 80 MMHG | SYSTOLIC BLOOD PRESSURE: 120 MMHG | WEIGHT: 161.8 LBS | BODY MASS INDEX: 31.77 KG/M2 | TEMPERATURE: 97.7 F | HEIGHT: 60 IN

## 2024-03-22 DIAGNOSIS — M54.9 OTHER CHRONIC BACK PAIN: Primary | ICD-10-CM

## 2024-03-22 DIAGNOSIS — G89.29 OTHER CHRONIC BACK PAIN: Primary | ICD-10-CM

## 2024-03-22 PROCEDURE — 99204 OFFICE O/P NEW MOD 45 MIN: CPT | Performed by: PHYSICIAN ASSISTANT

## 2024-03-22 NOTE — PROGRESS NOTES
Patient: Molly Ferguson  : 1975  Chart #: [id number]    Date of Service: 2024    Chief Complaint   Patient presents with    Back Pain    Neck Pain     Back pain and neck pain  Back pain is the worse           HPI  Ms. Ferguson is a 48-year-old female referred by Jace cobian PA-C in [place] for evaluation of neck and back pain.  Her pain is in the right side of her neck to the top of her shoulder she does not have pain numbness or tingling down her arm and she has low back pain across the lower back.  Her symptoms are going on for several years, the mornings and end of the day evenings seem to be the most uncomfortable.  Bending and walking increases her symptoms, she is better with sitting.  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  She has had flareups before and with this recent 1 she has had a Toradol shot and a steroid shot which does provide some improvement in her symptoms.  We have an MRI of the lumbar spine.    Chronic Illnesses:  Past Medical History:   Diagnosis Date    Anxiety     Depression     Depressive disorder     GERD (gastroesophageal reflux disease)     History of anxiety     History of hyperlipidemia     Low back pain     Migraines     Obesity     Patellar malalignment syndrome of left knee     PONV (postoperative nausea and vomiting)     Tennis elbow     Trigger thumb     Visual disturbances          Past Surgical History:   Procedure Laterality Date     SECTION      x 2    COLONOSCOPY      ENDOSCOPY      ESSURE TUBAL LIGATION      HYSTERECTOMY      JOINT REPLACEMENT  2023    SHOULDER SURGERY Left 2020    TENNIS ELBOW RELEASE Left 11/10/2020    Procedure: ELBOW TENNIS ELBOW DEBRIDEMENT WITH ANCHOR REPAIR LEFT;  Surgeon: Francisco Landers MD;  Location: [facility];  Service: Orthopedics;  Laterality: Left;    TUBAL ABDOMINAL LIGATION         Allergies   Allergen Reactions    Codeine Shortness Of Breath     Nickel Hives    Penicillins Rash    Morphine Rash    Morphine And Related GI Intolerance         Current Outpatient Medications:     Atogepant (Qulipta) 60 MG tablet, Take 1 tablet by mouth Daily., Disp: 30 tablet, Rfl: 11    buPROPion XL (WELLBUTRIN XL) 300 MG 24 hr tablet, TAKE 1 TABLET BY MOUTH DAILY, Disp: 90 tablet, Rfl: 0    busPIRone (BUSPAR) 10 MG tablet, Take 1 tablet by mouth Daily., Disp: , Rfl:     Cequa 0.09 % solution, INSTILL 1 DROP IN BOTH EYES TWICE A DAY, Disp: , Rfl:     cyclobenzaprine (FLEXERIL) 10 MG tablet, Take 1 tablet by mouth 2 (Two) Times a Day., Disp: , Rfl:     diclofenac (VOLTAREN) 75 MG EC tablet, Take 1 tablet by mouth 2 (Two) Times a Day., Disp: 90 tablet, Rfl: 1    docusate sodium 250 MG capsule, As Needed., Disp: , Rfl:     Emgality 120 MG/ML solution prefilled syringe, Inject 1 mL under the skin into the appropriate area as directed Every 30 (Thirty) Days., Disp: 1 mL, Rfl: 11    gabapentin (NEURONTIN) 600 MG tablet, TAKE 2 TABLETS BY MOUTH EVERY MORNING, ONE TABLET BY MOUTH AT NOON AND TAKE TWO TABLETS BY MOUTH ONCE NIGHTLY, Disp: 150 tablet, Rfl: 0    hydrOXYzine pamoate (VISTARIL) 50 MG capsule, TAKE ONE CAPSULE BY MOUTH EVERY 8 HOURS AS NEEDED FOR ANXIETY (Patient taking differently: Take 1 capsule by mouth Daily.), Disp: 90 capsule, Rfl: 1    methylPREDNISolone (MEDROL) 4 MG dose pack, Take as directed on package instructions., Disp: 21 tablet, Rfl: 0    OnabotulinumtoxinA 200 units reconstituted solution, Inject 200 units IM into head neck shoulders every 12 weeks per FDA protocol Dx G43.719 SHIP TO [facility]., Disp: 1 each, Rfl: 3    ondansetron (ZOFRAN) 4 MG tablet, TAKE 1 TABLET BY MOUTH EVERY 8 HOURS AS NEEDED FOR NAUSEA AND/OR VOMITING, Disp: 18 tablet, Rfl: 1    pantoprazole (PROTONIX) 40 MG EC tablet, Take 1 tablet by mouth Daily., Disp: 90 tablet, Rfl: 2    saccharomyces boulardii (FLORASTOR) 250 MG capsule, Take 1 capsule by mouth Daily., Disp: , Rfl:      spironolactone (ALDACTONE) 50 MG tablet, , Disp: , Rfl:     SUMAtriptan (IMITREX) 100 MG tablet, TAKE 1 TABLET BY MOUTH AT ONSET OF HEADACHE; MAY REPEAT 1 TABLET IN 2 HOURS IF NEEDED. MAX OF 2 TABLETS PER DAY, Disp: 9 tablet, Rfl: 0    SUMAtriptan Succinate (IMITREX) 6 MG/0.5ML injection, INJECT PRESCRIBED DOSE AT ONSET OF HEADACHE. MAY REPEAT DOSE ONE TIME IN 1 HOUR IF HEADACHE NOT RELIEVED, Disp: 1 mL, Rfl: 6    Topiramate ER (Trokendi XR) 200 MG capsule sustained-release 24 hr, Take 1 capsule by mouth Daily., Disp: 90 capsule, Rfl: 0    traZODone (DESYREL) 100 MG tablet, TAKE ONE TABLET BY MOUTH ONCE NIGHTLY, Disp: 90 tablet, Rfl: 1    ubrogepant (UBRELVY) 50 MG tablet, Take 2 tablets by mouth As Needed (As needed) for up to 1 dose., Disp: 10 tablet, Rfl: 3    vilazodone (VIIBRYD) 20 MG tablet tablet, , Disp: , Rfl:     Zavegepant HCl (Zavzpret) 10 MG/ACT solution, Administer 1 spray in nostril As Needed for migraine. Maximum of one spray (10 mg) per 24 hours., Disp: 6 each, Rfl: 3    Current Facility-Administered Medications:     OnabotulinumtoxinA 200 Units, 200 Units, Intramuscular, Q3 Months, Darrell Del Valle MD, 200 Units at 24 0903    Social History     Socioeconomic History    Marital status:    Tobacco Use    Smoking status: Former     Current packs/day: 0.00     Average packs/day: 1 pack/day for 5.0 years (5.0 ttl pk-yrs)     Types: Cigarettes     Start date: 2018     Quit date: 2023     Years since quittin.9    Smokeless tobacco: Never   Vaping Use    Vaping status: Never Used   Substance and Sexual Activity    Alcohol use: Not Currently     Comment: Once every six months or so    Drug use: Never    Sexual activity: Yes     Partners: Male     Birth control/protection: Bilateral salpingectomy      Comment: Hysterectomy       Family History   Problem Relation Age of Onset    Skin cancer Mother     Hypertension Father     Heart disease Father     Diabetes Father     Hyperlipidemia  Father     Arthritis Father     COPD Father     Hypertension Maternal Grandmother     Breast cancer Maternal Grandmother     Dementia Maternal Grandmother     Alzheimer's disease Other     Parkinsonism Other         Parkinsons Disease    Heart disease Other         Congenital    Cancer Other     Alzheimer's disease Other     Parkinsonism Paternal Grandfather                Social History    Tobacco Use      Smoking status: Former        Packs/day: 0.00        Years: 1 pack/day for 5.0 years (5.0 ttl pk-yrs)        Types: Cigarettes        Start date: 2018        Quit date: 2023        Years since quittin.9      Smokeless tobacco: Never       Review of Systems   Constitutional:  Positive for unexpected weight change. Negative for activity change, appetite change, chills, diaphoresis, fatigue and fever.   HENT:  Negative for congestion, dental problem, drooling, ear discharge, ear pain, facial swelling, hearing loss, mouth sores, nosebleeds, postnasal drip, rhinorrhea, sinus pressure, sinus pain, sneezing, sore throat, tinnitus, trouble swallowing and voice change.    Eyes:  Negative for photophobia, pain, discharge, redness, itching and visual disturbance.   Respiratory:  Negative for apnea, cough, choking, chest tightness, shortness of breath, wheezing and stridor.    Cardiovascular:  Negative for chest pain, palpitations and leg swelling.   Gastrointestinal:  Negative for abdominal distention, abdominal pain, anal bleeding, blood in stool, constipation, diarrhea, nausea, rectal pain and vomiting.   Endocrine: Negative for cold intolerance, heat intolerance, polydipsia, polyphagia and polyuria.   Genitourinary:  Negative for decreased urine volume, difficulty urinating, dysuria, enuresis, flank pain, frequency, genital sores, hematuria and urgency.   Musculoskeletal:  Positive for back pain, neck pain and neck stiffness. Negative for arthralgias, gait problem, joint swelling and myalgias.   Skin:   "Negative for color change, pallor, rash and wound.   Allergic/Immunologic: Negative for environmental allergies, food allergies and immunocompromised state.   Neurological:  Positive for headaches. Negative for dizziness, tremors, seizures, syncope, facial asymmetry, speech difficulty, weakness, light-headedness and numbness.   Hematological:  Negative for adenopathy. Does not bruise/bleed easily.   Psychiatric/Behavioral:  Negative for agitation, behavioral problems, confusion, decreased concentration, dysphoric mood, hallucinations, self-injury, sleep disturbance and suicidal ideas. The patient is not nervous/anxious and is not hyperactive.         Gait & Balance Assessment:  Risk assessment for falls. Fall precautions:  such as;   Using gait aids a cane, walker at the appropriate height at all times for ambulation or if necessary a wheelchair  Removing all area rugs and coffee tables to create a safe environment at home  Ensure clean, dry floors  Wearing supportive footwear and properly fitting clothing  Ensure bed/chair is appropriate height and patient's feet can touch the floor  Using a shower transfer bench  Using walk-in shower and having shower safety bars installed  Ensure proper lighting, minimize glare  Have nightlights operational and in use  Participation in an exercise program for gait training, balance training and strength  Avoid carrying laundry up and down steps  Ensure proper compliance and organization of medications to avoid errors   Avoid use of over the counter sedatives and alcohol consumption  Ensure easy access to call bell, glasses, TV control, telephone  Ensure glasses/hearing aids are in use or close by (on top of night table)     Physical examination:  Blood pressure 120/80, temperature 97.7 °F (36.5 °C), temperature source Infrared, height 152.4 cm (60\"), weight 73.4 kg (161 lb 12.8 oz).  HEENT- normocephalic, atraumatic, sclera clear  Lungs-normal expansion, no " wheezing  Heart-regular rate and rhythm  Extremities-positive pulses, no edema    Neurologic Exam  WDWN WF  A/A/C, speech clear, attention normal, conversant, answers questions appropriately, good historian.  Cranial nerves II through XII are intact.  Motor examination does not reveal weakness in the , upper or lower extremities.   Sensation is intact.  Gait is normal, balance is normal.   No tremors are noted.  Reflexes are intact.   Barnes is negative. Clonus is negative.       Radiographic Imaging:  For my review , Mild facet arthropathy at L4-5 and L5-S1.  The paraspinal musculature at L4-5 and L5-S1 reveals fat replacement.    Medical Decision Making  Diagnoses and all orders for this visit:    1. Other chronic back pain (Primary)  -     Ambulatory Referral to Physical Therapy Evaluate and treat; Heat; Stretching, ROM, Strengthening    Other orders  -     MRI outside films; Future      Sabine is a 48-year-old female with right-sided neck pain as well as low back pain.  Today we have an MRI of the lumbar spine that is reviewed and shows very mild degenerative changes, no evidence of stenosis or nerve root compression.  There is noted some fat replacement of the paraspinal musculature outside of the lamina of L4-5 and L5-S1 which may be amenable to physical therapy with lumbar Germania exercises and pelvic floor strengthening.  I have discussed this with the patient and she is in agreement to proceed.  I am not making a formal follow-up from a neurosurgical perspective.  It was a pleasure providing neurosurgical evaluation.      Any copied data from previous notes included in the (1) HPI, (2) PE, (3) MDM and/or assessment and plan has been reviewed and is accurate as of this day.    JET Ortega    Patient Care Team:  Andreia Vargas PA-C as PCP - General (Family Medicine)  Andreia Vargas PA-C as Referring Physician (Family Medicine)  Josie Staley PA-C as Consulting Physician (Physician  Assistant)

## 2024-03-25 ENCOUNTER — SPECIALTY PHARMACY (OUTPATIENT)
Dept: NEUROLOGY | Facility: CLINIC | Age: 49
End: 2024-03-25
Payer: COMMERCIAL

## 2024-03-25 NOTE — PROGRESS NOTES
Specialty Pharmacy Refill Coordination Note     Molly is a 48 y.o. female contacted today regarding refills of  Emgality due 3/27 (takes every 25-28 days)  specialty medication(s).    Reviewed and verified with patient:       Specialty medication(s) and dose(s) confirmed: yes    Refill Questions      Flowsheet Row Most Recent Value   Changes to allergies? No   Changes to medications? No   New conditions or infections since last clinic visit No   Unplanned office visit, urgent care, ED, or hospital admission in the last 4 weeks  No   How does patient/caregiver feel medication is working? Very good   Financial problems or insurance changes  No   Since the previous refill, were any specialty medication doses or scheduled injections missed or delayed?  No   Does this patient require a clinical escalation to a pharmacist? No            Delivery Questions      Flowsheet Row Most Recent Value   Delivery method FedEx   Delivery address verified with patient/caregiver? Yes   Delivery address Home   Number of medications in delivery 1   Medication(s) being filled and delivered Galcanezumab-Gnlm   Doses left of specialty medications Emgality due 3/27 (takes every 25-28 days)   Copay verified? Yes   Copay amount $0 insurance/copay card   Copay form of payment No copayment ($0)                   Follow-up: 1 month(s)     Berta Davis, JeovannyD

## 2024-03-28 DIAGNOSIS — G43.719 INTRACTABLE CHRONIC MIGRAINE WITHOUT AURA AND WITHOUT STATUS MIGRAINOSUS: ICD-10-CM

## 2024-03-28 RX ORDER — GABAPENTIN 600 MG/1
TABLET ORAL
Qty: 150 TABLET | Refills: 0 | Status: SHIPPED | OUTPATIENT
Start: 2024-03-28

## 2024-03-28 NOTE — TELEPHONE ENCOUNTER
MEDICATION REFILL REQUEST    Caller: Molly Ferguson    Relationship: Self    Best call back number: [phone]    Requested Prescriptions:   Requested Prescriptions     Pending Prescriptions Disp Refills    gabapentin (NEURONTIN) 600 MG tablet 150 tablet 0     Sig: TAKE 2 TABLETS BY MOUTH EVERY MORNING, ONE TABLET BY MOUTH AT NOON AND TAKE TWO TABLETS BY MOUTH ONCE NIGHTLY      Pharmacy where request should be sent: [facility] [id number], [place] - [address] AT [facility] [address] - [phone]  - [phone] FX     Last office visit with prescribing clinician: Visit date not found   Last telemedicine visit with prescribing clinician: Visit date not found   Next office visit with prescribing clinician: 6/12/2024     Additional details provided by patient: PT HAS JUST A FEW DAYS OF THE MEDICATION LEFT AND IS LEAVING FOR [place] TOMORROW; WILL NOT RETURN UNTIL NEXT THURSDAY AND WILL RUN OUT WHILE ON HER TRIP.    TO EXPEDITE REQUEST, VERBALLY ADVISED JOSSUE.    Does the patient have less than a 3 day supply?:  [x] Yes  [] No    Would you like a call back once the refill request has been completed?: [] Yes  [x] No    If the office needs to give you a call back, can they leave a voicemail?: [] Yes  [x] No    PLEASE REVIEW AND ADVISE.    Elder Loco Rep   03/28/24 12:53 EDT

## 2024-03-28 NOTE — TELEPHONE ENCOUNTER
Rx Refill Note  Requested Prescriptions     Pending Prescriptions Disp Refills    gabapentin (NEURONTIN) 600 MG tablet 150 tablet 0     Sig: TAKE 2 TABLETS BY MOUTH EVERY MORNING, ONE TABLET BY MOUTH AT NOON AND TAKE TWO TABLETS BY MOUTH ONCE NIGHTLY      Last filled:2/5/24 0 refill  Last office visit with prescribing clinician: Visit date not found      Next office visit with prescribing clinician: 6/12/2024     PATITO MONTAGUE  03/28/24, 13:14 EDT    Pending to provider--Patient leaving tomorrow to [place]. She has a couple days left of Gabapentin and needs to pick  today and not out of state.

## 2024-04-02 ENCOUNTER — SPECIALTY PHARMACY (OUTPATIENT)
Dept: NEUROLOGY | Facility: CLINIC | Age: 49
End: 2024-04-02
Payer: COMMERCIAL

## 2024-04-02 NOTE — PROGRESS NOTES
Specialty Pharmacy Refill Coordination Note     Molly is a 48 y.o. female contacted today regarding refills of Qulipta    Reviewed and verified with patient:       Specialty medication(s) and dose(s) confirmed: yes    Refill Questions      Flowsheet Row Most Recent Value   Changes to allergies? No   Changes to medications? No   New conditions or infections since last clinic visit No   Unplanned office visit, urgent care, ED, or hospital admission in the last 4 weeks  No   How does patient/caregiver feel medication is working? Very good   Financial problems or insurance changes  No   Since the previous refill, were any specialty medication doses or scheduled injections missed or delayed?  No   Does this patient require a clinical escalation to a pharmacist? No            Delivery Questions      Flowsheet Row Most Recent Value   Delivery method FedEx   Delivery address verified with patient/caregiver? Yes   Delivery address Home   Number of medications in delivery 1   Medication(s) being filled and delivered Atogepant   Doses left of specialty medications a few   Copay verified? Yes   Copay amount $0 insurance/copay card   Copay form of payment No copayment ($0)                   Follow-up: 1 month(s)     Berta Davis, PharmD

## 2024-04-20 NOTE — TELEPHONE ENCOUNTER
Rx Refill Note  Requested Prescriptions     Pending Prescriptions Disp Refills   • SUMAtriptan (IMITREX) 100 MG tablet [Pharmacy Med Name: SUMAtriptan SUCC 100 MG TABLET] 6 tablet 0     Sig: TAKE ONE TABLET BY MOUTH AT ONSET OF HEADACHE; MAY REPEAT ONE TABLET IN 2 HOURS IF NEEDED. MAX OF 2 TABLETS PER DAY   • gabapentin (NEURONTIN) 600 MG tablet [Pharmacy Med Name: GABAPENTIN 600 MG TABLET] 150 tablet      Sig: TAKE TWO TABLETS BY MOUTH EVERY MORNING AND ONE TABLET AT NOON AND 2 TABLETS AT NIGHT      Last filled: Imtrex last filled 12/21/22 with 0 refills  Gabapentin last filled 8/16/22 with 3 refills. Pending to provider  Last office visit with prescribing clinician: Visit date not found      Next office visit with prescribing clinician: 3/8/2023     Bassem Lassiter MA  01/16/23, 08:37 EST  
good minus

## 2024-04-22 ENCOUNTER — SPECIALTY PHARMACY (OUTPATIENT)
Dept: NEUROLOGY | Facility: CLINIC | Age: 49
End: 2024-04-22
Payer: COMMERCIAL

## 2024-04-22 NOTE — PROGRESS NOTES
Specialty Pharmacy Refill Coordination Note     Molly is a 48 y.o. female contacted today regarding refills of Emgality specialty medication(s). Patient due for next injection on 4/26/24.    Reviewed and verified with patient:       Specialty medication(s) and dose(s) confirmed: yes    Refill Questions      Flowsheet Row Most Recent Value   Changes to allergies? No   Changes to medications? No   New conditions or infections since last clinic visit No   Unplanned office visit, urgent care, ED, or hospital admission in the last 4 weeks  No   How does patient/caregiver feel medication is working? Very good   Financial problems or insurance changes  No   Since the previous refill, were any specialty medication doses or scheduled injections missed or delayed?  No   Does this patient require a clinical escalation to a pharmacist? No            Delivery Questions      Flowsheet Row Most Recent Value   Delivery method FedEx   Delivery address verified with patient/caregiver? Yes   Delivery address Home   Number of medications in delivery 1   Medication(s) being filled and delivered Galcanezumab-Gnlm   Doses left of specialty medications 0   Copay verified? Yes   Copay amount 0.00   Copay form of payment No copayment ($0)                   Follow-up: 28 day(s)     Kay Mendes, Pharmacy Technician  Specialty Pharmacy Technician

## 2024-04-29 ENCOUNTER — SPECIALTY PHARMACY (OUTPATIENT)
Dept: NEUROLOGY | Facility: CLINIC | Age: 49
End: 2024-04-29
Payer: COMMERCIAL

## 2024-04-29 NOTE — PROGRESS NOTES
Specialty Pharmacy Refill Coordination Note     Molly is a 48 y.o. female contacted today regarding refills of Qulipta.    Reviewed and verified with patient:       Specialty medication(s) and dose(s) confirmed: yes    Refill Questions      Flowsheet Row Most Recent Value   Changes to allergies? No   Changes to medications? No   New conditions or infections since last clinic visit No   Unplanned office visit, urgent care, ED, or hospital admission in the last 4 weeks  No   How does patient/caregiver feel medication is working? Very good   Financial problems or insurance changes  No   Since the previous refill, were any specialty medication doses or scheduled injections missed or delayed?  No   Does this patient require a clinical escalation to a pharmacist? No            Delivery Questions      Flowsheet Row Most Recent Value   Delivery method FedEx   Delivery address verified with patient/caregiver? Yes   Delivery address Home   Number of medications in delivery 2   Medication(s) being filled and delivered Atogepant   Doses left of specialty medications a few   Copay verified? Yes   Copay amount $0 insurance/copay card   Copay form of payment No copayment ($0)                   Follow-up: 1 month(s)     Berta Davis, PharmD

## 2024-05-01 DIAGNOSIS — G43.719 INTRACTABLE CHRONIC MIGRAINE WITHOUT AURA AND WITHOUT STATUS MIGRAINOSUS: ICD-10-CM

## 2024-05-02 RX ORDER — GABAPENTIN 600 MG/1
TABLET ORAL
Qty: 150 TABLET | Refills: 3 | Status: SHIPPED | OUTPATIENT
Start: 2024-05-02

## 2024-05-02 RX ORDER — BUPROPION HYDROCHLORIDE 300 MG/1
300 TABLET ORAL DAILY
Qty: 90 TABLET | Refills: 1 | Status: SHIPPED | OUTPATIENT
Start: 2024-05-02

## 2024-05-02 RX ORDER — SUMATRIPTAN 100 MG/1
TABLET, FILM COATED ORAL
Qty: 9 TABLET | Refills: 0 | Status: SHIPPED | OUTPATIENT
Start: 2024-05-02

## 2024-05-02 NOTE — TELEPHONE ENCOUNTER
Rx Refill Note  Requested Prescriptions     Pending Prescriptions Disp Refills    gabapentin (NEURONTIN) 600 MG tablet [Pharmacy Med Name: GABAPENTIN 600 MG TABLET] 150 tablet      Sig: TAKE 2 TABLETS BY MOUTH EVERY MORNING, 1 TABLET AT NOON, AND 2 TABLETS EVERY EVENING     Signed Prescriptions Disp Refills    SUMAtriptan (IMITREX) 100 MG tablet 9 tablet 0     Sig: TAKE 1 TABLET BY MOUTH AT ONSET OF HEADACHE; MAY REPEAT 1 TABLET IN 2 HOURS IF NEEDED. MAX OF 2 TABLETS DAILY     Authorizing Provider: ANIL BARKLEY     Ordering User: LUIS CUNNINGHAM      Gabapentin last filled: 3/28/24 30 days with 0 refills    Last office visit with prescribing clinician: 3/13/24 Botox   Next office visit with prescribing clinician: 6/12/2024     Luis Cunningham MA  05/02/24, 08:53 EDT

## 2024-05-02 NOTE — TELEPHONE ENCOUNTER
Rx Refill Note  Requested Prescriptions     Pending Prescriptions Disp Refills    SUMAtriptan (IMITREX) 100 MG tablet [Pharmacy Med Name: SUMATRIPTAN SUCC 100 MG TABLET] 9 tablet 0     Sig: TAKE 1 TABLET BY MOUTH AT ONSET OF HEADACHE; MAY REPEAT 1 TABLET IN 2 HOURS IF NEEDED. MAX OF 2 TABLETS DAILY    gabapentin (NEURONTIN) 600 MG tablet [Pharmacy Med Name: GABAPENTIN 600 MG TABLET] 150 tablet      Sig: TAKE 2 TABLETS BY MOUTH EVERY MORNING, 1 TABLET AT NOON, AND 2 TABLETS EVERY EVENING      Imitrex last filled 3/19/24 9 with 0 refills

## 2024-05-21 ENCOUNTER — SPECIALTY PHARMACY (OUTPATIENT)
Dept: GENERAL RADIOLOGY | Facility: HOSPITAL | Age: 49
End: 2024-05-21
Payer: COMMERCIAL

## 2024-05-21 NOTE — PROGRESS NOTES
Specialty Pharmacy Refill Coordination Note     Molly is a 48 y.o. female contacted today regarding refills of Emgality due 5/23 (27 days) and Botox appt is 6/12/24.    Reviewed and verified with patient:       Specialty medication(s) and dose(s) confirmed: yes    Refill Questions      Flowsheet Row Most Recent Value   Changes to allergies? No   Changes to medications? No   New conditions or infections since last clinic visit No   Unplanned office visit, urgent care, ED, or hospital admission in the last 4 weeks  No   How does patient/caregiver feel medication is working? Very good   Financial problems or insurance changes  No   Since the previous refill, were any specialty medication doses or scheduled injections missed or delayed?  No   Does this patient require a clinical escalation to a pharmacist? No            Delivery Questions      Flowsheet Row Most Recent Value   Delivery method FedEx   Delivery address verified with patient/caregiver? Yes   Delivery address Other (Comment)  [Emgality--home, Botox--to [address]]   Number of medications in delivery 2   Medication(s) being filled and delivered Onabotulinumtoxina, Galcanezumab-Gnlm   Doses left of specialty medications Emgality due 5/23 (27 days) and Botox appt is 6/12   Copay verified? Yes   Copay amount $0 insurance/copay card both   Copay form of payment No copayment ($0)                   Follow-up: 1 month(s) for Emgality, 3 months for Botox     Berta Davis, PharmD

## 2024-05-29 ENCOUNTER — SPECIALTY PHARMACY (OUTPATIENT)
Dept: NEUROLOGY | Facility: CLINIC | Age: 49
End: 2024-05-29
Payer: COMMERCIAL

## 2024-05-29 NOTE — PROGRESS NOTES
Specialty Pharmacy Refill Coordination Note     Molly is a 48 y.o. female contacted today regarding refills of Qulipta specialty medication(s).    Reviewed and verified with patient:       Specialty medication(s) and dose(s) confirmed: yes    Refill Questions      Flowsheet Row Most Recent Value   Changes to allergies? No   Changes to medications? No   New conditions or infections since last clinic visit No   Unplanned office visit, urgent care, ED, or hospital admission in the last 4 weeks  No   How does patient/caregiver feel medication is working? Very good   Financial problems or insurance changes  No   Since the previous refill, were any specialty medication doses or scheduled injections missed or delayed?  No   Does this patient require a clinical escalation to a pharmacist? No            Delivery Questions      Flowsheet Row Most Recent Value   Delivery method [employer]Ex   Delivery address verified with patient/caregiver? Yes   Delivery address Home   Number of medications in delivery 1   Medication(s) being filled and delivered Atogepant   Doses left of specialty medications About a week   Copay verified? Yes   Copay amount 0.00   Copay form of payment No copayment ($0)                   Follow-up: 28 day(s)     Kay Mendes, Pharmacy Technician  Specialty Pharmacy Technician

## 2024-06-03 DIAGNOSIS — G43.719 INTRACTABLE CHRONIC MIGRAINE WITHOUT AURA AND WITHOUT STATUS MIGRAINOSUS: ICD-10-CM

## 2024-06-03 RX ORDER — SUMATRIPTAN 100 MG/1
TABLET, FILM COATED ORAL
Qty: 9 TABLET | Refills: 0 | Status: SHIPPED | OUTPATIENT
Start: 2024-06-03

## 2024-06-03 NOTE — TELEPHONE ENCOUNTER
Rx Refill Note  Requested Prescriptions     Pending Prescriptions Disp Refills    SUMAtriptan (IMITREX) 100 MG tablet [Pharmacy Med Name: SUMATRIPTAN SUCC 100 MG TABLET] 9 tablet 0     Sig: TAKE 1 TABLET BY MOUTH AT ONSET OF HEADACHE; MAY REPEAT 1 TABLET IN 2 HOURS IF NEEDED. MAX OF 2 TABLETS DAILY      Last filled: 5/2/24 #9 with 0 refills    Last office visit with prescribing clinician: 3/13/24 Botox  Next office visit with prescribing clinician: 6/12/2024     Luis Jesus MA  06/03/24, 14:55 EDT

## 2024-06-06 ENCOUNTER — SPECIALTY PHARMACY (OUTPATIENT)
Dept: GENERAL RADIOLOGY | Facility: HOSPITAL | Age: 49
End: 2024-06-06
Payer: COMMERCIAL

## 2024-06-06 NOTE — PROGRESS NOTES
Specialty Pharmacy Patient Management Program  Neurology Reassessment     Molly Ferguson is a 49 y.o. female with migraines seen by a Neurology provider and enrolled in the Neurology Patient Management program offered by [facility].  A follow-up outreach was conducted, including assessment of continued therapy appropriateness, medication adherence, and side effect incidence and management for Qulipta, Botox, and Emgality.    Changes to Insurance Coverage or Financial Support  none     Relevant Past Medical History and Comorbidities  Relevant medical history and concomitant health conditions were discussed with the patient. The patient's chart has been reviewed for relevant past medical history and comorbid health conditions and updated as necessary.   Past Medical History:   Diagnosis Date    Anxiety     Depression     Depressive disorder     GERD (gastroesophageal reflux disease)     History of anxiety     History of hyperlipidemia     Low back pain     Migraines     Obesity     Patellar malalignment syndrome of left knee     PONV (postoperative nausea and vomiting)     Tennis elbow     Trigger thumb     Visual disturbances      Social History     Socioeconomic History    Marital status:    Tobacco Use    Smoking status: Former     Current packs/day: 0.00     Average packs/day: 1 pack/day for 5.0 years (5.0 ttl pk-yrs)     Types: Cigarettes     Start date: 2018     Quit date: 2023     Years since quittin.1    Smokeless tobacco: Never   Vaping Use    Vaping status: Never Used   Substance and Sexual Activity    Alcohol use: Not Currently     Comment: Once every six months or so    Drug use: Never    Sexual activity: Yes     Partners: Male     Birth control/protection: Bilateral salpingectomy      Comment: Hysterectomy     Problem list reviewed by Berta Davis, PharmD on 2024 at  2:00 PM    Hospitalizations and Urgent Care Since Last Assessment  ED Visits,  Admissions, or Hospitalizations: none   Urgent Office Visits: none     Allergies  Known allergies and reactions were discussed with the patient. The patient's chart has been reviewed for allergy information and updated as necessary.   Allergies   Allergen Reactions    Codeine Shortness Of Breath    Nickel Hives    Penicillins Rash    Morphine Rash    Morphine And Codeine GI Intolerance     Allergies reviewed by Berta Davis, PharmD on 6/6/2024 at  1:58 PM      Current Medication List  This medication list has been reviewed with the patient and evaluated for any interactions or necessary modifications/recommendations, and updated to include all prescription medications, OTC medications, and supplements the patient is currently taking.  This list reflects what is contained in the patient's profile, which has also been marked as reviewed to communicate to other providers it is the most up to date version of the patient's current medication therapy.     Current Outpatient Medications:     Atogepant (Qulipta) 60 MG tablet, Take 1 tablet by mouth Daily., Disp: 30 tablet, Rfl: 11    buPROPion XL (WELLBUTRIN XL) 300 MG 24 hr tablet, TAKE 1 TABLET BY MOUTH DAILY, Disp: 90 tablet, Rfl: 1    busPIRone (BUSPAR) 10 MG tablet, Take 1 tablet by mouth Daily., Disp: , Rfl:     Cequa 0.09 % solution, INSTILL 1 DROP IN BOTH EYES TWICE A DAY, Disp: , Rfl:     cyclobenzaprine (FLEXERIL) 10 MG tablet, Take 1 tablet by mouth 2 (Two) Times a Day., Disp: , Rfl:     diclofenac (VOLTAREN) 75 MG EC tablet, Take 1 tablet by mouth 2 (Two) Times a Day., Disp: 90 tablet, Rfl: 1    docusate sodium 250 MG capsule, As Needed., Disp: , Rfl:     Emgality 120 MG/ML solution prefilled syringe, Inject 1 mL under the skin into the appropriate area as directed Every 30 (Thirty) Days., Disp: 1 mL, Rfl: 11    gabapentin (NEURONTIN) 600 MG tablet, TAKE 2 TABLETS BY MOUTH EVERY MORNING, 1 TABLET AT NOON, AND 2 TABLETS EVERY EVENING, Disp: 150 tablet, Rfl:  3    OnabotulinumtoxinA 200 units reconstituted solution, Inject 200 units IM into head neck shoulders every 12 weeks per FDA protocol Dx G43.719 SHIP TO SANDRITA HOLDEN., Disp: 1 each, Rfl: 3    ondansetron (ZOFRAN) 4 MG tablet, TAKE 1 TABLET BY MOUTH EVERY 8 HOURS AS NEEDED FOR NAUSEA AND/OR VOMITING, Disp: 18 tablet, Rfl: 1    pantoprazole (PROTONIX) 40 MG EC tablet, Take 1 tablet by mouth Daily., Disp: 90 tablet, Rfl: 2    saccharomyces boulardii (FLORASTOR) 250 MG capsule, Take 1 capsule by mouth Daily., Disp: , Rfl:     spironolactone (ALDACTONE) 50 MG tablet, , Disp: , Rfl:     SUMAtriptan (IMITREX) 100 MG tablet, TAKE 1 TABLET BY MOUTH AT ONSET OF HEADACHE; MAY REPEAT 1 TABLET IN 2 HOURS IF NEEDED. MAX OF 2 TABLETS DAILY, Disp: 9 tablet, Rfl: 0    SUMAtriptan Succinate (IMITREX) 6 MG/0.5ML injection, INJECT PRESCRIBED DOSE AT ONSET OF HEADACHE. MAY REPEAT DOSE ONE TIME IN 1 HOUR IF HEADACHE NOT RELIEVED, Disp: 1 mL, Rfl: 6    Topiramate ER (Trokendi XR) 200 MG capsule sustained-release 24 hr, Take 1 capsule by mouth Daily., Disp: 90 capsule, Rfl: 0    traZODone (DESYREL) 100 MG tablet, TAKE ONE TABLET BY MOUTH ONCE NIGHTLY, Disp: 90 tablet, Rfl: 1    ubrogepant (UBRELVY) 50 MG tablet, Take 2 tablets by mouth As Needed (As needed) for up to 1 dose., Disp: 10 tablet, Rfl: 3    vilazodone (VIIBRYD) 20 MG tablet tablet, , Disp: , Rfl:     Zavegepant HCl (Zavzpret) 10 MG/ACT solution, Administer 1 spray in nostril As Needed for migraine. Maximum of one spray (10 mg) per 24 hours., Disp: 6 each, Rfl: 3  No current facility-administered medications for this visit.    Medicines reviewed by Berta Davis, PharmD on 6/6/2024 at  1:59 PM    Drug Interactions  none     Adverse Drug Reactions  Medication tolerability: Tolerating with no to minimal ADRs          Medication plan: Continue therapy with normal follow-up  Plan for ADR Management: not required      Adherence, Self-Administration, and Current Therapy  Problems  Adherence related patient's specialty therapy was discussed with the patient. The Adherence segment of this outreach has been reviewed and updated.   Adherence Questions  Linked Medication(s) Assessed: Atogepant, Galcanezumab-Gnlm, Onabotulinumtoxina  On average, how many doses/injections does the patient miss per month?: 0  What are the identified reasons for non-adherence or missed doses? : no problems identified  What is the estimated medication adherence level?: %  Based on the patient/caregiver response and refill history, does this patient require an MTP to track adherence improvements?: no    Additional Barriers to Patient Self-Administration: none  Methods for Supporting Patient Self-Administration: not required    Recently Close Medication Therapy Problems  No medication therapy recommendations to display  Open Medication Therapy Problems  No medication therapy recommendations to display     Goals of Therapy  Goals related to the patient's specialty therapy was discussed with the patient. The Patient Goals segment of this outreach has been reviewed and updated.    Goals Addressed Today        Specialty Pharmacy General Goal      Decrease frequency, severity and duration of migraine attacks                Quality of Life Assessment   Quality of Life related to the patient's enrollment in the patient management program and services provided was discussed with the patient. The QOL segment of this outreach has been reviewed and updated.   Quality of Life Improvement Scale: 9-A good deal better    Reassessment Plan & Follow-Up  Medication Therapy Changes: Continue with Emgality 120 mg subcutaneously monthly, Botox 155 units IM to be administered by Provider in office every 3 months, and Qulipta 60 mg PO daily  Related Plans, Therapy Recommendations, or Issues to Be Addressed: patient reports doing very well on regimen  Pharmacist to perform regular reassessments no more than (6) months from the  previous assessment.  Care Coordinator to set up future refill outreaches, coordinate prescription delivery, and escalate clinical questions to pharmacist.     Attestation  Therapeutic appropriateness: Appropriate  I attest the patient was actively involved in and has agreed to the above plan of care. If the prescribed therapy is at any point deemed not appropriate based on the current or future assessments, a consultation will be initiated with the patient's specialty care provider to determine the best course of action. The revised plan of therapy will be documented along with any additional patient education provided. Discussed aforementioned material with patient by phone.    Berta Davis, PharmD, [facility]  Clinic Specialty Pharmacist, Neurology  6/6/2024  14:01 EDT

## 2024-06-12 ENCOUNTER — PROCEDURE VISIT (OUTPATIENT)
Dept: NEUROLOGY | Facility: CLINIC | Age: 49
End: 2024-06-12
Payer: COMMERCIAL

## 2024-06-12 DIAGNOSIS — G43.719 INTRACTABLE CHRONIC MIGRAINE WITHOUT AURA AND WITHOUT STATUS MIGRAINOSUS: Primary | ICD-10-CM

## 2024-06-12 PROCEDURE — 64615 CHEMODENERV MUSC MIGRAINE: CPT | Performed by: PSYCHIATRY & NEUROLOGY

## 2024-06-17 ENCOUNTER — SPECIALTY PHARMACY (OUTPATIENT)
Dept: NEUROLOGY | Facility: CLINIC | Age: 49
End: 2024-06-17
Payer: COMMERCIAL

## 2024-06-17 NOTE — PROGRESS NOTES
Specialty Pharmacy Refill Coordination Note     Molly is a 49 y.o. female contacted today regarding refills of Emgality due every 25 days (next 6/20) specialty medication(s).    Reviewed and verified with patient:       Specialty medication(s) and dose(s) confirmed: yes    Refill Questions      Flowsheet Row Most Recent Value   Changes to allergies? No   Changes to medications? No   New conditions or infections since last clinic visit No   Unplanned office visit, urgent care, ED, or hospital admission in the last 4 weeks  No   How does patient/caregiver feel medication is working? Very good   Financial problems or insurance changes  No   Since the previous refill, were any specialty medication doses or scheduled injections missed or delayed?  No   Does this patient require a clinical escalation to a pharmacist? No            Delivery Questions      Flowsheet Row Most Recent Value   Delivery method [employer]Ex   Delivery address verified with patient/caregiver? Yes   Delivery address Home   Number of medications in delivery 1   Medication(s) being filled and delivered Galcanezumab-Gnlm   Doses left of specialty medications Emgality due every 25 days--next 6/20   Copay verified? Yes   Copay amount $0 insurance/copay card   Copay form of payment No copayment ($0)                   Follow-up: ~20 day(s)     Berta Davis, JeovannyD

## 2024-06-28 ENCOUNTER — SPECIALTY PHARMACY (OUTPATIENT)
Dept: NEUROLOGY | Facility: CLINIC | Age: 49
End: 2024-06-28
Payer: COMMERCIAL

## 2024-06-28 NOTE — PROGRESS NOTES
Specialty Pharmacy Refill Coordination Note     Molly is a 49 y.o. female contacted today regarding refills of Qulipta specialty medication(s).    Reviewed and verified with patient:       Specialty medication(s) and dose(s) confirmed: yes    Refill Questions      Flowsheet Row Most Recent Value   Changes to allergies? No   Changes to medications? No   New conditions or infections since last clinic visit No   Unplanned office visit, urgent care, ED, or hospital admission in the last 4 weeks  No   How does patient/caregiver feel medication is working? Very good   Financial problems or insurance changes  No   Since the previous refill, were any specialty medication doses or scheduled injections missed or delayed?  No   Does this patient require a clinical escalation to a pharmacist? No            Delivery Questions      Flowsheet Row Most Recent Value   Delivery method [employer]Ex   Delivery address verified with patient/caregiver? Yes   Delivery address Home   Number of medications in delivery 1   Medication(s) being filled and delivered Atogepant   Doses left of specialty medications less than a week   Copay verified? Yes   Copay amount 0.00   Copay form of payment No copayment ($0)                   Follow-up: 28 day(s)     Kay Mendes, Pharmacy Technician  Specialty Pharmacy Technician

## 2024-07-02 RX ORDER — PANTOPRAZOLE SODIUM 40 MG/1
40 TABLET, DELAYED RELEASE ORAL DAILY
Qty: 90 TABLET | Refills: 2 | Status: SHIPPED | OUTPATIENT
Start: 2024-07-02

## 2024-07-12 ENCOUNTER — SPECIALTY PHARMACY (OUTPATIENT)
Dept: NEUROLOGY | Facility: CLINIC | Age: 49
End: 2024-07-12
Payer: COMMERCIAL

## 2024-07-12 NOTE — PROGRESS NOTES
Specialty Pharmacy Refill Coordination Note     Molly is a 49 y.o. female contacted today regarding refills of Emgality specialty medication(s). Patient due for next injection on 7/19/24.    Reviewed and verified with patient:       Specialty medication(s) and dose(s) confirmed: yes    Refill Questions      Flowsheet Row Most Recent Value   Changes to allergies? No   Changes to medications? No   New conditions or infections since last clinic visit No   Unplanned office visit, urgent care, ED, or hospital admission in the last 4 weeks  No   How does patient/caregiver feel medication is working? Very good   Financial problems or insurance changes  No   Since the previous refill, were any specialty medication doses or scheduled injections missed or delayed?  No   Does this patient require a clinical escalation to a pharmacist? No            Delivery Questions      Flowsheet Row Most Recent Value   Delivery method [employer]Ex   Delivery address verified with patient/caregiver? Yes   Delivery address Home   Number of medications in delivery 1   Medication(s) being filled and delivered Galcanezumab-Gnlm   Doses left of specialty medications 0   Copay verified? Yes   Copay amount 0.00   Copay form of payment No copayment ($0)   Ship Date 7/15   Delivery Date 7/16   Signature Required No                   Follow-up: 28 day(s)     Kay Mendes, Pharmacy Technician  Specialty Pharmacy Technician

## 2024-07-17 DIAGNOSIS — G43.719 INTRACTABLE CHRONIC MIGRAINE WITHOUT AURA AND WITHOUT STATUS MIGRAINOSUS: ICD-10-CM

## 2024-07-17 RX ORDER — SUMATRIPTAN 100 MG/1
TABLET, FILM COATED ORAL
Qty: 9 TABLET | Refills: 1 | Status: SHIPPED | OUTPATIENT
Start: 2024-07-17

## 2024-07-17 RX ORDER — TRAZODONE HYDROCHLORIDE 100 MG/1
TABLET ORAL
Qty: 90 TABLET | Refills: 1 | Status: SHIPPED | OUTPATIENT
Start: 2024-07-17

## 2024-07-17 NOTE — TELEPHONE ENCOUNTER
Rx Refill Note  Requested Prescriptions     Pending Prescriptions Disp Refills    SUMAtriptan (IMITREX) 100 MG tablet [Pharmacy Med Name: SUMATRIPTAN SUCC 100 MG TABLET] 9 tablet 0     Sig: TAKE 1 TABLET BY MOUTH AT ONSET OF MIGRAINE; MAY REPEAT 1 TABLET IN 2 HOURS IF NEEDED. MAX 2 PER DAY      Last filled: 6/3/24  #9 with 0 refills  Last office visit with prescribing clinician: 6/12/24 Botox  Next office visit with prescribing clinician: 9/4/2024     Luis Jesus MA  07/17/24, 09:10 EDT

## 2024-07-23 ENCOUNTER — SPECIALTY PHARMACY (OUTPATIENT)
Dept: NEUROLOGY | Facility: CLINIC | Age: 49
End: 2024-07-23
Payer: COMMERCIAL

## 2024-07-23 NOTE — PROGRESS NOTES
Specialty Pharmacy Refill Coordination Note     Molly is a 49 y.o. female contacted today regarding refills of Qulipta.    Reviewed and verified with patient:       Specialty medication(s) and dose(s) confirmed: yes    Refill Questions      Flowsheet Row Most Recent Value   Changes to allergies? No   Changes to medications? No   New conditions or infections since last clinic visit No   Unplanned office visit, urgent care, ED, or hospital admission in the last 4 weeks  No   How does patient/caregiver feel medication is working? Very good   Financial problems or insurance changes  No   Since the previous refill, were any specialty medication doses or scheduled injections missed or delayed?  No   Does this patient require a clinical escalation to a pharmacist? No            Delivery Questions      Flowsheet Row Most Recent Value   Delivery method [employer]Ex   Delivery address verified with patient/caregiver? Yes   Delivery address Home   Number of medications in delivery 1   Medication(s) being filled and delivered Atogepant   Doses left of specialty medications a few   Copay verified? Yes   Copay amount $0   Copay form of payment No copayment ($0)   Ship Date 7/23/24   Delivery Date 7/24/24   Signature Required No                   Follow-up: 1 month(s)     Berta Davis, PharmD

## 2024-08-05 RX ORDER — CYCLOBENZAPRINE HCL 10 MG
10 TABLET ORAL 2 TIMES DAILY
Qty: 60 TABLET | Refills: 5 | Status: SHIPPED | OUTPATIENT
Start: 2024-08-05

## 2024-08-08 ENCOUNTER — SPECIALTY PHARMACY (OUTPATIENT)
Dept: NEUROLOGY | Facility: CLINIC | Age: 49
End: 2024-08-08
Payer: COMMERCIAL

## 2024-08-08 NOTE — PROGRESS NOTES
Specialty Pharmacy Refill Coordination Note     Molly is a 49 y.o. female contacted today regarding refills of Botox specialty medication(s). This refill is for the appointment scheduled on 9/4/24.    Reviewed and verified with patient:       Specialty medication(s) and dose(s) confirmed: yes    Refill Questions      Flowsheet Row Most Recent Value   Changes to allergies? No   Changes to medications? No   New conditions or infections since last clinic visit No   Unplanned office visit, urgent care, ED, or hospital admission in the last 4 weeks  No   How does patient/caregiver feel medication is working? Very good   Financial problems or insurance changes  No   Since the previous refill, were any specialty medication doses or scheduled injections missed or delayed?  No   Does this patient require a clinical escalation to a pharmacist? No            Delivery Questions      Flowsheet Row Most Recent Value   Delivery method [employer]Ex   Delivery address verified with patient/caregiver? Yes   Delivery address Other (Comment)  [Office]   Number of medications in delivery 1   Medication(s) being filled and delivered Onabotulinumtoxina   Doses left of specialty medications 0   Copay verified? Yes   Copay amount 0.00   Copay form of payment No copayment ($0)   Ship Date 8/8   Delivery Date 8/9   Signature Required Yes                   Follow-up: 84 day(s)     Kay Mendes, Pharmacy Technician  Specialty Pharmacy Technician

## 2024-08-08 NOTE — PROGRESS NOTES
Specialty Pharmacy Refill Coordination Note     Molly is a 49 y.o. female contacted today regarding refills of Emgality specialty medication(s). Patient due for next injection on 8/13/24.    Reviewed and verified with patient:       Specialty medication(s) and dose(s) confirmed: yes    Refill Questions      Flowsheet Row Most Recent Value   Changes to allergies? No   Changes to medications? No   New conditions or infections since last clinic visit No   Unplanned office visit, urgent care, ED, or hospital admission in the last 4 weeks  No   How does patient/caregiver feel medication is working? Very good   Financial problems or insurance changes  No   Since the previous refill, were any specialty medication doses or scheduled injections missed or delayed?  No   Does this patient require a clinical escalation to a pharmacist? No            Delivery Questions      Flowsheet Row Most Recent Value   Delivery method [employer]Ex   Delivery address verified with patient/caregiver? Yes   Delivery address Home   Number of medications in delivery 1   Medication(s) being filled and delivered Galcanezumab-Gnlm   Doses left of specialty medications 0   Copay verified? Yes   Copay amount 0.00   Copay form of payment No copayment ($0)   Ship Date 8/8   Delivery Date 8/9   Signature Required No                   Follow-up: 28 day(s)     Kay Mendes, Pharmacy Technician  Specialty Pharmacy Technician

## 2024-08-19 ENCOUNTER — SPECIALTY PHARMACY (OUTPATIENT)
Dept: NEUROLOGY | Facility: CLINIC | Age: 49
End: 2024-08-19
Payer: COMMERCIAL

## 2024-08-19 NOTE — PROGRESS NOTES
Specialty Pharmacy Refill Coordination Note     Molly is a 49 y.o. female contacted today regarding refills of Qulipta specialty medication(s).    Reviewed and verified with patient:       Specialty medication(s) and dose(s) confirmed: yes    Refill Questions      Flowsheet Row Most Recent Value   Changes to allergies? No   Changes to medications? No   New conditions or infections since last clinic visit No   Unplanned office visit, urgent care, ED, or hospital admission in the last 4 weeks  No   How does patient/caregiver feel medication is working? Very good   Financial problems or insurance changes  No   Since the previous refill, were any specialty medication doses or scheduled injections missed or delayed?  No   Does this patient require a clinical escalation to a pharmacist? No            Delivery Questions      Flowsheet Row Most Recent Value   Delivery method [employer]Ex   Delivery address verified with patient/caregiver? Yes   Delivery address Home   Number of medications in delivery 1   Medication(s) being filled and delivered Atogepant   Doses left of specialty medications About a week   Copay verified? Yes   Copay amount 0.00   Copay form of payment No copayment ($0)   Ship Date 8/20   Delivery Date 8/21   Signature Required No                   Follow-up: 28 day(s)     Kay Mendes, Pharmacy Technician  Specialty Pharmacy Technician

## 2024-09-04 ENCOUNTER — SPECIALTY PHARMACY (OUTPATIENT)
Dept: NEUROLOGY | Facility: CLINIC | Age: 49
End: 2024-09-04
Payer: COMMERCIAL

## 2024-09-11 ENCOUNTER — PROCEDURE VISIT (OUTPATIENT)
Dept: NEUROLOGY | Facility: CLINIC | Age: 49
End: 2024-09-11
Payer: COMMERCIAL

## 2024-09-11 DIAGNOSIS — G43.719 INTRACTABLE CHRONIC MIGRAINE WITHOUT AURA AND WITHOUT STATUS MIGRAINOSUS: Primary | ICD-10-CM

## 2024-09-11 NOTE — PROGRESS NOTES
CC:  Migraines.    HPI : Patient is in clinic for Botox injection.  Current headache frequency is approximately 6-8 headache days in a month with some headaches lasting for more than 4 hours.      Botox Procedure Note    Current headache frequency: 6-8 headaches days / month.    The risks and benefits were discussed with the patient. The patient was given the opportunity to ask questions. Informed consent form was signed.    Onabotulinumtoxin A was reconstituted with 0.9% normal saline. All injections sites were prepped with alcohol swab. Approximately 5 units of botox were injected into each of the following sites bilaterally as per routine migraine botox injection PREEMPT protocol: , procerus, frontalis, temporalis, occipitalis, upper cervical paraspinals, and trapezius.    The patient tolerated the procedure well. There were no immediate complications. The patient will follow up in approximately 12 weeks for her next injection.    Total amount of onabotulinumtoxin A injected was 155 units with 45 units wasted.    Note: With Ubrelvy 60 mg daily, Emgality once a month injection and Botox, migraine frequency and intensity remain stable.  She is using Ubrelvy alternating with Zavzpret nasal spray and is working well.  I will see her back in 12 weeks for next injection.

## 2024-09-16 ENCOUNTER — SPECIALTY PHARMACY (OUTPATIENT)
Dept: GENERAL RADIOLOGY | Facility: HOSPITAL | Age: 49
End: 2024-09-16
Payer: COMMERCIAL

## 2024-09-20 RX ORDER — TRAZODONE HYDROCHLORIDE 100 MG/1
TABLET ORAL
Qty: 90 TABLET | Refills: 1 | Status: SHIPPED | OUTPATIENT
Start: 2024-09-20

## 2024-10-03 ENCOUNTER — SPECIALTY PHARMACY (OUTPATIENT)
Dept: NEUROLOGY | Facility: CLINIC | Age: 49
End: 2024-10-03
Payer: COMMERCIAL

## 2024-10-03 NOTE — PROGRESS NOTES
Specialty Pharmacy Refill Coordination Note     Molly is a 49 y.o. female contacted today regarding refills of Emgality specialty medication(s). Patient due for next injection on 10/8/24.    Reviewed and verified with patient:       Specialty medication(s) and dose(s) confirmed: yes    Refill Questions      Flowsheet Row Most Recent Value   Changes to allergies? No   Changes to medications? No   New conditions or infections since last clinic visit No   Unplanned office visit, urgent care, ED, or hospital admission in the last 4 weeks  No   How does patient/caregiver feel medication is working? Very good   Financial problems or insurance changes  No   Since the previous refill, were any specialty medication doses or scheduled injections missed or delayed?  No   Does this patient require a clinical escalation to a pharmacist? No            Delivery Questions      Flowsheet Row Most Recent Value   Delivery method [employer]   Delivery address verified with patient/caregiver? Yes   Delivery address Home   Number of medications in delivery 1   Medication(s) being filled and delivered Galcanezumab-Gnlm   Doses left of specialty medications 0   Copay verified? Yes   Copay amount 0.00   Copay form of payment No copayment ($0)   Ship Date 10/3   Delivery Date 10/4   Signature Required No                   Follow-up: 28 day(s)     Kay Mendes, Pharmacy Technician  Specialty Pharmacy Technician

## 2024-10-08 DIAGNOSIS — G43.719 INTRACTABLE CHRONIC MIGRAINE WITHOUT AURA AND WITHOUT STATUS MIGRAINOSUS: ICD-10-CM

## 2024-10-08 RX ORDER — BUPROPION HYDROCHLORIDE 300 MG/1
300 TABLET ORAL DAILY
Qty: 90 TABLET | Refills: 1 | Status: SHIPPED | OUTPATIENT
Start: 2024-10-08

## 2024-10-09 RX ORDER — SUMATRIPTAN 100 MG/1
TABLET, FILM COATED ORAL
Qty: 9 TABLET | Refills: 1 | Status: SHIPPED | OUTPATIENT
Start: 2024-10-09

## 2024-10-09 RX ORDER — TOPIRAMATE 200 MG/1
1 CAPSULE, EXTENDED RELEASE ORAL DAILY
Qty: 90 CAPSULE | Refills: 0 | Status: SHIPPED | OUTPATIENT
Start: 2024-10-09

## 2024-10-09 NOTE — TELEPHONE ENCOUNTER
Rx Refill Note  Requested Prescriptions     Pending Prescriptions Disp Refills    Topiramate  MG capsule sustained-release 24 hr [Pharmacy Med Name: TOPIRAMATE  MG CAPSULE] 90 capsule 0     Sig: TAKE 1 CAPSULE BY MOUTH DAILY    SUMAtriptan (IMITREX) 100 MG tablet [Pharmacy Med Name: SUMAtriptan SUCC 100 MG TABLET] 9 tablet 1     Sig: TAKE 1 TABLET BY MOUTH AT ONSET OF MIGRAINE; MAY REPEAT 1 TABLET IN 2 HOURS IF NEEDED. MAX 2 PER DAY    gabapentin (NEURONTIN) 600 MG tablet [Pharmacy Med Name: GABAPENTIN 600 MG TABLET] 150 tablet      Sig: TAKE 2 TABLETS BY MOUTH EVERY MORNING AND 1 AT NOON AND 2 EVERY EVENING      Last filled:    Topamax 2/26/24 90 day 0 refill    Gabapentin 5/2/24 30 day with 3 refill    Last office visit with prescribing clinician: 9/11/24 Botox   Next office visit with prescribing clinician: 12/11/2024 Botox    Luis Jesus MA  10/09/24, 09:30 EDT

## 2024-10-10 RX ORDER — GABAPENTIN 600 MG/1
TABLET ORAL
Qty: 150 TABLET | Refills: 3 | Status: SHIPPED | OUTPATIENT
Start: 2024-10-10

## 2024-10-11 ENCOUNTER — SPECIALTY PHARMACY (OUTPATIENT)
Dept: NEUROLOGY | Facility: CLINIC | Age: 49
End: 2024-10-11
Payer: COMMERCIAL

## 2024-10-11 NOTE — PROGRESS NOTES
Specialty Pharmacy Refill Coordination Note     Molly is a 49 y.o. female contacted today regarding refills of  Qulitpa specialty medication(s).    Reviewed and verified with patient:       Specialty medication(s) and dose(s) confirmed: yes    Refill Questions      Flowsheet Row Most Recent Value   Changes to allergies? No   Changes to medications? No   New conditions or infections since last clinic visit No   Unplanned office visit, urgent care, ED, or hospital admission in the last 4 weeks  No   How does patient/caregiver feel medication is working? Very good   Financial problems or insurance changes  No   Since the previous refill, were any specialty medication doses or scheduled injections missed or delayed?  No   Does this patient require a clinical escalation to a pharmacist? No            Delivery Questions      Flowsheet Row Most Recent Value   Delivery method UPS   Delivery address verified with patient/caregiver? Yes   Delivery address Home   Number of medications in delivery 1   Medication(s) being filled and delivered Atogepant   Doses left of specialty medications 5 tabelts   Copay verified? Yes   Copay amount $0   Copay form of payment No copayment ($0)   Ship Date 10/14/24   Delivery Date 10/15/24   Signature Required No                   Follow-up: 28 day(s)     Tahmina Barriga [facility]

## 2024-10-23 ENCOUNTER — SPECIALTY PHARMACY (OUTPATIENT)
Dept: NEUROLOGY | Facility: CLINIC | Age: 49
End: 2024-10-23
Payer: COMMERCIAL

## 2024-10-24 ENCOUNTER — SPECIALTY PHARMACY (OUTPATIENT)
Dept: GENERAL RADIOLOGY | Facility: HOSPITAL | Age: 49
End: 2024-10-24
Payer: COMMERCIAL

## 2024-10-28 ENCOUNTER — SPECIALTY PHARMACY (OUTPATIENT)
Dept: NEUROLOGY | Facility: CLINIC | Age: 49
End: 2024-10-28
Payer: COMMERCIAL

## 2024-10-28 NOTE — PROGRESS NOTES
Specialty Pharmacy Refill Coordination Note     Molly is a 49 y.o. female contacted today regarding refills of  Emgality specialty medication(s).    Reviewed and verified with patient:       Specialty medication(s) and dose(s) confirmed: yes    Refill Questions      Flowsheet Row Most Recent Value   Changes to allergies? No   Changes to medications? No   New conditions or infections since last clinic visit No   Unplanned office visit, urgent care, ED, or hospital admission in the last 4 weeks  No   How does patient/caregiver feel medication is working? Very good   Financial problems or insurance changes  No   Since the previous refill, were any specialty medication doses or scheduled injections missed or delayed?  No   Does this patient require a clinical escalation to a pharmacist? No            Delivery Questions      Flowsheet Row Most Recent Value   Delivery method UPS   Delivery address verified with patient/caregiver? Yes   Delivery address Home   Number of medications in delivery 1   Medication(s) being filled and delivered Galcanezumab-gnlm (Emgality)   Doses left of specialty medications 0   Copay verified? Yes   Copay amount $0   Copay form of payment No copayment ($0)   Ship Date 10/29/24   Delivery Date 10/30/24   Signature Required No                   Follow-up: 28 day(s)     Tahmina Barriga [facility]

## 2024-10-29 ENCOUNTER — OFFICE VISIT (OUTPATIENT)
Dept: FAMILY MEDICINE CLINIC | Facility: CLINIC | Age: 49
End: 2024-10-29
Payer: COMMERCIAL

## 2024-10-29 VITALS
SYSTOLIC BLOOD PRESSURE: 118 MMHG | HEIGHT: 60 IN | HEART RATE: 70 BPM | WEIGHT: 120 LBS | OXYGEN SATURATION: 99 % | BODY MASS INDEX: 23.56 KG/M2 | DIASTOLIC BLOOD PRESSURE: 72 MMHG

## 2024-10-29 DIAGNOSIS — K58.0 IRRITABLE BOWEL SYNDROME WITH DIARRHEA: Primary | ICD-10-CM

## 2024-10-29 PROCEDURE — 99213 OFFICE O/P EST LOW 20 MIN: CPT | Performed by: PHYSICIAN ASSISTANT

## 2024-10-29 RX ORDER — DICYCLOMINE HCL 20 MG
20 TABLET ORAL EVERY 6 HOURS
Qty: 120 TABLET | Refills: 5 | Status: SHIPPED | OUTPATIENT
Start: 2024-10-29

## 2024-10-29 RX ORDER — CYCLOBENZAPRINE HCL 10 MG
10 TABLET ORAL 2 TIMES DAILY
Qty: 60 TABLET | Refills: 5 | Status: SHIPPED | OUTPATIENT
Start: 2024-10-29

## 2024-10-29 NOTE — PROGRESS NOTES
"Chief Complaint  GI Problem (Any food she eats past week goes straight through her, stomach cramping but no nausea or vomiting. States she has had accidents and lived off anti-diarrhea )    Subjective          Molly Ferguson presents to [facility]    GI Problem  The primary symptoms include abdominal pain (cramping pain) and diarrhea. Primary symptoms do not include fever, fatigue, nausea, vomiting or myalgias.   The illness does not include chills, constipation or back pain.    Having issues with IBS, eats then has explosive diarrhea.  This has been going on for a long time. Has been taking antidiarrheal medications for a long time, but she's still having accidents and she needs to do something to help this.      Objective   Vital Signs:   /72 (BP Location: Left arm, Patient Position: Sitting, Cuff Size: Adult)   Pulse 70   Ht 152.4 cm (60\")   Wt 54.4 kg (120 lb)   SpO2 99%   BMI 23.44 kg/m²     Body mass index is 23.44 kg/m².    Review of Systems   Constitutional:  Negative for chills, fatigue and fever.   Respiratory:  Negative for cough and shortness of breath.    Cardiovascular:  Negative for chest pain and palpitations.   Gastrointestinal:  Positive for abdominal pain (cramping pain) and diarrhea. Negative for blood in stool, constipation, nausea, rectal pain, vomiting, GERD and indigestion.   Musculoskeletal:  Negative for back pain and myalgias.   Neurological:  Negative for dizziness and headache.   Psychiatric/Behavioral:  Negative for depressed mood. The patient is not nervous/anxious.         Past History:  Medical History: has a past medical history of Anxiety, Depression, Depressive disorder, GERD (gastroesophageal reflux disease), History of anxiety, History of hyperlipidemia, Low back pain, Migraines, Obesity, Patellar malalignment syndrome of left knee, PONV (postoperative nausea and vomiting), Tennis elbow, Trigger thumb, and Visual disturbances. "   Surgical History: has a past surgical history that includes Tubal ligation; Hysterectomy; Shoulder surgery (Left, 2020);  section; Colonoscopy; Esophagogastroduodenoscopy; Tennis Elbow Release (Left, 11/10/2020); Essure tubal ligation; and Joint replacement (2023).   Family History: family history includes Alzheimer's disease in some other family members; Arthritis in her father; Breast cancer in her maternal grandmother; COPD in her father; Cancer in an other family member; Dementia in her maternal grandmother; Diabetes in her father; Heart disease in her father and another family member; Hyperlipidemia in her father; Hypertension in her father and maternal grandmother; Parkinsonism in her paternal grandfather and another family member; Skin cancer in her mother.   Social History: reports that she quit smoking about 18 months ago. Her smoking use included cigarettes. She started smoking about 6 years ago. She has a 10 pack-year smoking history. She has never used smokeless tobacco. She reports that she does not currently use alcohol. She reports that she does not use drugs.      Current Outpatient Medications:     Atogepant (Qulipta) 60 MG tablet, Take 1 tablet by mouth Daily., Disp: 30 tablet, Rfl: 11    buPROPion XL (WELLBUTRIN XL) 300 MG 24 hr tablet, TAKE 1 TABLET BY MOUTH DAILY, Disp: 90 tablet, Rfl: 1    busPIRone (BUSPAR) 10 MG tablet, Take 1 tablet by mouth Daily., Disp: , Rfl:     Cequa 0.09 % solution, INSTILL 1 DROP IN BOTH EYES TWICE A DAY, Disp: , Rfl:     cyclobenzaprine (FLEXERIL) 10 MG tablet, Take 1 tablet by mouth 2 (Two) Times a Day., Disp: 60 tablet, Rfl: 5    diclofenac (VOLTAREN) 75 MG EC tablet, Take 1 tablet by mouth 2 (Two) Times a Day., Disp: 90 tablet, Rfl: 1    Emgality 120 MG/ML solution prefilled syringe, Inject 1 mL under the skin into the appropriate area as directed Every 30 (Thirty) Days., Disp: 1 mL, Rfl: 11    gabapentin (NEURONTIN) 600 MG tablet, TAKE 2  TABLETS BY MOUTH EVERY MORNING AND 1 AT NOON AND 2 EVERY EVENING, Disp: 150 tablet, Rfl: 3    OnabotulinumtoxinA 200 units reconstituted solution, Inject 200 units IM into head neck shoulders every 12 weeks per FDA protocol Dx G43.719 SHIP TO [facility] [employer]., Disp: 1 each, Rfl: 3    ondansetron (ZOFRAN) 4 MG tablet, TAKE 1 TABLET BY MOUTH EVERY 8 HOURS AS NEEDED FOR NAUSEA AND/OR VOMITING, Disp: 18 tablet, Rfl: 1    pantoprazole (PROTONIX) 40 MG EC tablet, TAKE 1 TABLET BY MOUTH DAILY, Disp: 90 tablet, Rfl: 2    spironolactone (ALDACTONE) 50 MG tablet, , Disp: , Rfl:     SUMAtriptan (IMITREX) 100 MG tablet, TAKE 1 TABLET BY MOUTH AT ONSET OF MIGRAINE; MAY REPEAT 1 TABLET IN 2 HOURS IF NEEDED. MAX 2 PER DAY, Disp: 9 tablet, Rfl: 1    SUMAtriptan Succinate (IMITREX) 6 MG/0.5ML injection, INJECT PRESCRIBED DOSE AT ONSET OF HEADACHE. MAY REPEAT DOSE ONE TIME IN 1 HOUR IF HEADACHE NOT RELIEVED, Disp: 1 mL, Rfl: 6    Topiramate  MG capsule sustained-release 24 hr, TAKE 1 CAPSULE BY MOUTH DAILY, Disp: 90 capsule, Rfl: 0    traZODone (DESYREL) 100 MG tablet, TAKE ONE TABLET BY MOUTH ONCE NIGHTLY, Disp: 90 tablet, Rfl: 1    ubrogepant (UBRELVY) 50 MG tablet, Take 2 tablets by mouth As Needed (As needed) for up to 1 dose., Disp: 10 tablet, Rfl: 3    vilazodone (VIIBRYD) 20 MG tablet tablet, Take 1 tablet by mouth Daily., Disp: , Rfl:     Zavegepant HCl (Zavzpret) 10 MG/ACT solution, Administer 1 spray in nostril As Needed for migraine. Maximum of one spray (10 mg) per 24 hours., Disp: 6 each, Rfl: 3    dicyclomine (BENTYL) 20 MG tablet, Take 1 tablet by mouth Every 6 (Six) Hours. AC and HS, Disp: 120 tablet, Rfl: 5    riFAXIMin (XIFAXAN) 550 MG tablet, Take 1 tablet by mouth Every 8 (Eight) Hours., Disp: 15 tablet, Rfl: 0    riFAXIMin (XIFAXAN) 550 MG tablet, Take 1 tablet by mouth Every 8 (Eight) Hours. For acute IBS symptoms, Disp: 28 tablet, Rfl: 0    Current Facility-Administered Medications:      OnabotulinumtoxinA 200 Units, 200 Units, Intramuscular, Q3 Months, Darrell Del Valle MD, 200 Units at 09/11/24 1624    OnabotulinumtoxinA 200 Units, 200 Units, Intramuscular, Q3 Months, Darrell Del Valle MD    Allergies: Codeine, Nickel, Penicillins, Morphine, and Morphine and codeine    Physical Exam  Vitals reviewed.   Constitutional:       Appearance: Normal appearance.   Cardiovascular:      Rate and Rhythm: Normal rate and regular rhythm.      Heart sounds: Normal heart sounds.   Pulmonary:      Effort: Pulmonary effort is normal.      Breath sounds: Normal breath sounds.   Abdominal:      General: Bowel sounds are increased.      Palpations: Abdomen is soft. There is no mass.      Tenderness: There is no abdominal tenderness. There is no guarding or rebound.      Hernia: No hernia is present.   Musculoskeletal:         General: Normal range of motion.   Neurological:      General: No focal deficit present.      Mental Status: She is alert and oriented to person, place, and time.   Psychiatric:         Mood and Affect: Mood normal.          Result Review :                   Assessment and Plan    Diagnoses and all orders for this visit:    1. Irritable bowel syndrome with diarrhea (Primary)  Assessment & Plan:   I have suggested that we put her on Bentyl to help reduce the symptoms that she is experiencing and I am giving her some samples of Xifaxan and we will try to preauthorize a prescription for the full 2-week course but I did give her the 5-days of samples.  She has an appointment with a GI doctor in December but could not wait that long for treatment, she will get back with me if this is not effective.    Orders:  -     CBC & Differential; Future  -     Comprehensive Metabolic Panel; Future  -     TSH; Future  -     TSH  -     Comprehensive Metabolic Panel  -     CBC & Differential    Other orders  -     riFAXIMin (XIFAXAN) 550 MG tablet; Take 1 tablet by mouth Every 8 (Eight) Hours.  Dispense: 15 tablet;  Refill: 0  -     dicyclomine (BENTYL) 20 MG tablet; Take 1 tablet by mouth Every 6 (Six) Hours. AC and HS  Dispense: 120 tablet; Refill: 5  -     riFAXIMin (XIFAXAN) 550 MG tablet; Take 1 tablet by mouth Every 8 (Eight) Hours. For acute IBS symptoms  Dispense: 28 tablet; Refill: 0        Follow Up   No follow-ups on file.  Patient was given instructions and counseling regarding her condition or for health maintenance advice. Please see specific information pulled into the AVS if appropriate.     Andreia Vargas PA-C

## 2024-10-29 NOTE — ASSESSMENT & PLAN NOTE
I have suggested that we put her on Bentyl to help reduce the symptoms that she is experiencing and I am giving her some samples of Xifaxan and we will try to preauthorize a prescription for the full 2-week course but I did give her the 5-days of samples.  She has an appointment with a GI doctor in December but could not wait that long for treatment, she will get back with me if this is not effective.

## 2024-10-30 ENCOUNTER — TELEPHONE (OUTPATIENT)
Dept: FAMILY MEDICINE CLINIC | Facility: CLINIC | Age: 49
End: 2024-10-30
Payer: COMMERCIAL

## 2024-10-30 LAB
ALBUMIN SERPL-MCNC: 4.3 G/DL (ref 3.9–4.9)
ALP SERPL-CCNC: 66 IU/L (ref 44–121)
ALT SERPL-CCNC: 11 IU/L (ref 0–32)
AST SERPL-CCNC: 16 IU/L (ref 0–40)
BASOPHILS # BLD AUTO: 0.1 X10E3/UL (ref 0–0.2)
BASOPHILS NFR BLD AUTO: 1 %
BILIRUB SERPL-MCNC: <0.2 MG/DL (ref 0–1.2)
BUN SERPL-MCNC: 14 MG/DL (ref 6–24)
BUN/CREAT SERPL: 15 (ref 9–23)
CALCIUM SERPL-MCNC: 9.6 MG/DL (ref 8.7–10.2)
CHLORIDE SERPL-SCNC: 105 MMOL/L (ref 96–106)
CO2 SERPL-SCNC: 23 MMOL/L (ref 20–29)
CREAT SERPL-MCNC: 0.91 MG/DL (ref 0.57–1)
EGFRCR SERPLBLD CKD-EPI 2021: 78 ML/MIN/1.73
EOSINOPHIL # BLD AUTO: 0.1 X10E3/UL (ref 0–0.4)
EOSINOPHIL NFR BLD AUTO: 1 %
ERYTHROCYTE [DISTWIDTH] IN BLOOD BY AUTOMATED COUNT: 12.4 % (ref 11.7–15.4)
GLOBULIN SER CALC-MCNC: 2.7 G/DL (ref 1.5–4.5)
GLUCOSE SERPL-MCNC: 82 MG/DL (ref 70–99)
HCT VFR BLD AUTO: 38 % (ref 34–46.6)
HGB BLD-MCNC: 11.9 G/DL (ref 11.1–15.9)
IMM GRANULOCYTES # BLD AUTO: 0 X10E3/UL (ref 0–0.1)
IMM GRANULOCYTES NFR BLD AUTO: 0 %
LYMPHOCYTES # BLD AUTO: 2.8 X10E3/UL (ref 0.7–3.1)
LYMPHOCYTES NFR BLD AUTO: 28 %
MCH RBC QN AUTO: 30.8 PG (ref 26.6–33)
MCHC RBC AUTO-ENTMCNC: 31.3 G/DL (ref 31.5–35.7)
MCV RBC AUTO: 98 FL (ref 79–97)
MONOCYTES # BLD AUTO: 0.7 X10E3/UL (ref 0.1–0.9)
MONOCYTES NFR BLD AUTO: 7 %
NEUTROPHILS # BLD AUTO: 6.3 X10E3/UL (ref 1.4–7)
NEUTROPHILS NFR BLD AUTO: 63 %
PLATELET # BLD AUTO: 443 X10E3/UL (ref 150–450)
POTASSIUM SERPL-SCNC: 3.3 MMOL/L (ref 3.5–5.2)
PROT SERPL-MCNC: 7 G/DL (ref 6–8.5)
RBC # BLD AUTO: 3.86 X10E6/UL (ref 3.77–5.28)
SODIUM SERPL-SCNC: 143 MMOL/L (ref 134–144)
TSH SERPL DL<=0.005 MIU/L-ACNC: 0.96 UIU/ML (ref 0.45–4.5)
WBC # BLD AUTO: 9.9 X10E3/UL (ref 3.4–10.8)

## 2024-11-08 ENCOUNTER — SPECIALTY PHARMACY (OUTPATIENT)
Dept: NEUROLOGY | Facility: CLINIC | Age: 49
End: 2024-11-08
Payer: COMMERCIAL

## 2024-11-08 NOTE — PROGRESS NOTES
Specialty Pharmacy Refill Coordination Note     Molly is a 49 y.o. female contacted today regarding refills of Qulipta specialty medication(s).    Reviewed and verified with patient:       Specialty medication(s) and dose(s) confirmed: yes    Refill Questions      Flowsheet Row Most Recent Value   Changes to allergies? No   Changes to medications? No   New conditions or infections since last clinic visit No   Unplanned office visit, urgent care, ED, or hospital admission in the last 4 weeks  No   How does patient/caregiver feel medication is working? Very good   Financial problems or insurance changes  No   Since the previous refill, were any specialty medication doses or scheduled injections missed or delayed?  No   Does this patient require a clinical escalation to a pharmacist? No            Delivery Questions      Flowsheet Row Most Recent Value   Delivery method UPS   Delivery address verified with patient/caregiver? Yes   Delivery address Home   Number of medications in delivery 1   Medication(s) being filled and delivered Atogepant (Qulipta)   Doses left of specialty medications About a week   Copay verified? Yes   Copay amount 0.00   Copay form of payment No copayment ($0)   Ship Date 11/11   Delivery Date 11/12   Signature Required No                   Follow-up: 28 day(s)     Kay Mendes, Pharmacy Technician  Specialty Pharmacy Technician

## 2024-11-18 RX ORDER — VILAZODONE HYDROCHLORIDE 20 MG/1
20 TABLET ORAL DAILY
Qty: 7 TABLET | Refills: 1 | Status: SHIPPED | OUTPATIENT
Start: 2024-11-18 | End: 2024-11-22 | Stop reason: SDUPTHER

## 2024-11-22 ENCOUNTER — SPECIALTY PHARMACY (OUTPATIENT)
Dept: NEUROLOGY | Facility: CLINIC | Age: 49
End: 2024-11-22
Payer: COMMERCIAL

## 2024-11-22 RX ORDER — VILAZODONE HYDROCHLORIDE 20 MG/1
20 TABLET ORAL DAILY
Qty: 90 TABLET | Refills: 0 | Status: SHIPPED | OUTPATIENT
Start: 2024-11-22

## 2024-11-22 NOTE — PROGRESS NOTES
Specialty Pharmacy Refill Coordination Note     Molly is a 49 y.o. female contacted today regarding refills of Botox specialty medication(s). This refill is for the appointment scheduled on 12/11/24.    Reviewed and verified with patient:       Specialty medication(s) and dose(s) confirmed: yes    Refill Questions      Flowsheet Row Most Recent Value   Changes to allergies? No   Changes to medications? No   New conditions or infections since last clinic visit No   Unplanned office visit, urgent care, ED, or hospital admission in the last 4 weeks  No   How does patient/caregiver feel medication is working? Very good   Financial problems or insurance changes  No   Since the previous refill, were any specialty medication doses or scheduled injections missed or delayed?  No   Does this patient require a clinical escalation to a pharmacist? No            Delivery Questions      Flowsheet Row Most Recent Value   Delivery method [employer]   Delivery address verified with patient/caregiver? Yes   Delivery address Other (Comment)  [Office]   Number of medications in delivery 1   Medication(s) being filled and delivered OnabotulinumtoxinA   Doses left of specialty medications 0   Copay verified? Yes   Copay amount 0.00   Copay form of payment No copayment ($0)   Ship Date 11/25   Delivery Date 11/26   Signature Required Yes                   Follow-up: 84 day(s)     Kay Mendes, Pharmacy Technician  Specialty Pharmacy Technician

## 2024-11-25 ENCOUNTER — SPECIALTY PHARMACY (OUTPATIENT)
Dept: NEUROLOGY | Facility: CLINIC | Age: 49
End: 2024-11-25
Payer: COMMERCIAL

## 2024-11-25 ENCOUNTER — OFFICE VISIT (OUTPATIENT)
Dept: FAMILY MEDICINE CLINIC | Facility: CLINIC | Age: 49
End: 2024-11-25
Payer: COMMERCIAL

## 2024-11-25 VITALS
TEMPERATURE: 98.2 F | WEIGHT: 126 LBS | BODY MASS INDEX: 24.74 KG/M2 | DIASTOLIC BLOOD PRESSURE: 76 MMHG | SYSTOLIC BLOOD PRESSURE: 118 MMHG | HEIGHT: 60 IN | HEART RATE: 99 BPM | OXYGEN SATURATION: 97 %

## 2024-11-25 DIAGNOSIS — J01.00 ACUTE NON-RECURRENT MAXILLARY SINUSITIS: Primary | ICD-10-CM

## 2024-11-25 LAB
EXPIRATION DATE: NORMAL
FLUAV AG UPPER RESP QL IA.RAPID: NOT DETECTED
FLUBV AG UPPER RESP QL IA.RAPID: NOT DETECTED
INTERNAL CONTROL: NORMAL
Lab: NORMAL
SARS-COV-2 AG UPPER RESP QL IA.RAPID: NOT DETECTED

## 2024-11-25 PROCEDURE — 99213 OFFICE O/P EST LOW 20 MIN: CPT | Performed by: PHYSICIAN ASSISTANT

## 2024-11-25 PROCEDURE — 87428 SARSCOV & INF VIR A&B AG IA: CPT | Performed by: PHYSICIAN ASSISTANT

## 2024-11-25 RX ORDER — METHYLPREDNISOLONE 4 MG/1
TABLET ORAL
Qty: 21 TABLET | Refills: 0 | Status: SHIPPED | OUTPATIENT
Start: 2024-11-25

## 2024-11-25 RX ORDER — SCOLOPAMINE TRANSDERMAL SYSTEM 1 MG/1
1 PATCH, EXTENDED RELEASE TRANSDERMAL
Qty: 5 EACH | Refills: 0 | Status: SHIPPED | OUTPATIENT
Start: 2024-11-25

## 2024-11-25 RX ORDER — DOXYCYCLINE 100 MG/1
100 CAPSULE ORAL 2 TIMES DAILY
Qty: 20 CAPSULE | Refills: 0 | Status: SHIPPED | OUTPATIENT
Start: 2024-11-25

## 2024-11-25 RX ORDER — CHLORCYCLIZINE HYDROCHLORIDE AND PSEUDOEPHEDRINE HYDROCHLORIDE 25; 60 MG/1; MG/1
1 TABLET ORAL 2 TIMES DAILY
Qty: 30 TABLET | Refills: 0 | Status: SHIPPED | OUTPATIENT
Start: 2024-11-25

## 2024-11-25 RX ORDER — ESTRADIOL 1 MG/G
GEL TOPICAL
COMMUNITY
Start: 2024-11-22

## 2024-11-25 NOTE — PROGRESS NOTES
"Chief Complaint  Sinusitis (X4 days)    Subjective          Molly Ferguson presents to [facility]    Sinusitis  Associated symptoms include congestion and sinus pressure. Pertinent negatives include no chills, coughing, ear pain, shortness of breath, sore throat or swollen glands.    patient is reporting sinus pain and pressure, headache, postnasal drip for about 4 to 5 days.  Also ask if we can give her scopolamine patches for an upcoming cruise.    Objective   Vital Signs:   /76 (BP Location: Right arm, Patient Position: Sitting, Cuff Size: Adult)   Pulse 99   Temp 98.2 °F (36.8 °C) (Oral)   Ht 152.4 cm (60\")   Wt 57.2 kg (126 lb)   SpO2 97%   BMI 24.61 kg/m²     Body mass index is 24.61 kg/m².    Review of Systems   Constitutional:  Negative for chills, fatigue and fever.   HENT:  Positive for congestion, postnasal drip and sinus pressure. Negative for ear pain, sore throat and swollen glands.    Respiratory:  Negative for cough, chest tightness, shortness of breath and wheezing.    Cardiovascular:  Negative for chest pain and palpitations.   Musculoskeletal:  Negative for back pain and myalgias.   Neurological:  Negative for dizziness and headache.   Psychiatric/Behavioral:  Negative for depressed mood. The patient is not nervous/anxious.        Past History:  Medical History: has a past medical history of Anxiety, Depression, Depressive disorder, GERD (gastroesophageal reflux disease), History of anxiety, History of hyperlipidemia, Low back pain, Migraines, Obesity, Patellar malalignment syndrome of left knee, PONV (postoperative nausea and vomiting), Tennis elbow, Trigger thumb, and Visual disturbances.   Surgical History: has a past surgical history that includes Tubal ligation; Hysterectomy; Shoulder surgery (Left, 2020);  section; Colonoscopy; Esophagogastroduodenoscopy; Tennis Elbow Release (Left, 11/10/2020); Essure tubal ligation; and Joint " replacement (Jan 2023).   Family History: family history includes Alzheimer's disease in some other family members; Arthritis in her father; Breast cancer in her maternal grandmother; COPD in her father; Cancer in an other family member; Dementia in her maternal grandmother; Diabetes in her father; Heart disease in her father and another family member; Hyperlipidemia in her father; Hypertension in her father and maternal grandmother; Parkinsonism in her paternal grandfather and another family member; Skin cancer in her mother.   Social History: reports that she quit smoking about 19 months ago. Her smoking use included cigarettes. She started smoking about 6 years ago. She has a 10 pack-year smoking history. She has never used smokeless tobacco. She reports that she does not currently use alcohol. She reports that she does not use drugs.      Current Outpatient Medications:     Atogepant (Qulipta) 60 MG tablet, Take 1 tablet by mouth Daily., Disp: 30 tablet, Rfl: 11    buPROPion XL (WELLBUTRIN XL) 300 MG 24 hr tablet, TAKE 1 TABLET BY MOUTH DAILY, Disp: 90 tablet, Rfl: 1    busPIRone (BUSPAR) 10 MG tablet, Take 1 tablet by mouth Daily., Disp: , Rfl:     Cequa 0.09 % solution, INSTILL 1 DROP IN BOTH EYES TWICE A DAY, Disp: , Rfl:     cyclobenzaprine (FLEXERIL) 10 MG tablet, Take 1 tablet by mouth 2 (Two) Times a Day., Disp: 60 tablet, Rfl: 5    dicyclomine (BENTYL) 20 MG tablet, Take 1 tablet by mouth Every 6 (Six) Hours. AC and HS, Disp: 120 tablet, Rfl: 5    Emgality 120 MG/ML solution prefilled syringe, Inject 1 mL under the skin into the appropriate area as directed Every 30 (Thirty) Days., Disp: 1 mL, Rfl: 11    estradiol 1 MG/GM gel, , Disp: , Rfl:     gabapentin (NEURONTIN) 600 MG tablet, TAKE 2 TABLETS BY MOUTH EVERY MORNING AND 1 AT NOON AND 2 EVERY EVENING, Disp: 150 tablet, Rfl: 3    OnabotulinumtoxinA 200 units reconstituted solution, Inject 200 units IM into head neck shoulders every 12 weeks per FDA  protocol Dx G43.719 SHIP TO [facility]., Disp: 1 each, Rfl: 3    ondansetron (ZOFRAN) 4 MG tablet, TAKE 1 TABLET BY MOUTH EVERY 8 HOURS AS NEEDED FOR NAUSEA AND/OR VOMITING, Disp: 18 tablet, Rfl: 1    pantoprazole (PROTONIX) 40 MG EC tablet, TAKE 1 TABLET BY MOUTH DAILY, Disp: 90 tablet, Rfl: 2    SUMAtriptan (IMITREX) 100 MG tablet, TAKE 1 TABLET BY MOUTH AT ONSET OF MIGRAINE; MAY REPEAT 1 TABLET IN 2 HOURS IF NEEDED. MAX 2 PER DAY, Disp: 9 tablet, Rfl: 1    SUMAtriptan Succinate (IMITREX) 6 MG/0.5ML injection, INJECT PRESCRIBED DOSE AT ONSET OF HEADACHE. MAY REPEAT DOSE ONE TIME IN 1 HOUR IF HEADACHE NOT RELIEVED, Disp: 1 mL, Rfl: 6    Topiramate  MG capsule sustained-release 24 hr, TAKE 1 CAPSULE BY MOUTH DAILY, Disp: 90 capsule, Rfl: 0    traZODone (DESYREL) 100 MG tablet, TAKE ONE TABLET BY MOUTH ONCE NIGHTLY, Disp: 90 tablet, Rfl: 1    ubrogepant (UBRELVY) 50 MG tablet, Take 2 tablets by mouth As Needed (As needed) for up to 1 dose., Disp: 10 tablet, Rfl: 3    vilazodone (VIIBRYD) 20 MG tablet tablet, Take 1 tablet by mouth Daily. Patient needs appointment for annual physical, Disp: 90 tablet, Rfl: 0    Zavegepant HCl (Zavzpret) 10 MG/ACT solution, Administer 1 spray in nostril As Needed for migraine. Maximum of one spray (10 mg) per 24 hours., Disp: 6 each, Rfl: 3    Chlorcyclizine-Pseudoephed (Stahist AD) 25-60 MG tablet, Take 1 tablet by mouth 2 (Two) Times a Day., Disp: 30 tablet, Rfl: 0    doxycycline (MONODOX) 100 MG capsule, Take 1 capsule by mouth 2 (Two) Times a Day., Disp: 20 capsule, Rfl: 0    methylPREDNISolone (MEDROL) 4 MG dose pack, Take as directed on package instructions., Disp: 21 tablet, Rfl: 0    Scopolamine 1 MG/3DAYS patch, Place 1 patch on the skin as directed by provider Every 72 (Seventy-Two) Hours., Disp: 5 each, Rfl: 0    Current Facility-Administered Medications:     OnabotulinumtoxinA 200 Units, 200 Units, Intramuscular, Q3 Months, Darrell Del Valle MD, 200 Units at  09/11/24 1624    OnabotulinumtoxinA 200 Units, 200 Units, Intramuscular, Q3 Months, Darrell Del Valle MD    Allergies: Codeine, Nickel, Penicillins, Morphine, and Morphine and codeine    Physical Exam  Vitals reviewed.   Constitutional:       Appearance: Normal appearance.   HENT:      Right Ear: Tympanic membrane normal.      Left Ear: Tympanic membrane normal.      Nose: Congestion present.      Right Sinus: Maxillary sinus tenderness present. No frontal sinus tenderness.      Left Sinus: Maxillary sinus tenderness present. No frontal sinus tenderness.   Cardiovascular:      Rate and Rhythm: Normal rate and regular rhythm.      Heart sounds: Normal heart sounds.   Pulmonary:      Effort: Pulmonary effort is normal.      Breath sounds: Normal breath sounds.   Musculoskeletal:      Cervical back: Neck supple.   Lymphadenopathy:      Cervical: No cervical adenopathy.   Neurological:      Mental Status: She is alert and oriented to person, place, and time.   Psychiatric:         Mood and Affect: Mood normal.          Result Review :                   Assessment and Plan    Diagnoses and all orders for this visit:    1. Acute non-recurrent maxillary sinusitis (Primary)  Assessment & Plan:  Covid/Flu tests are negative. Rx for doxycycline, Medrol Dosepak and Stahist AD and advised patient to call or return if symptoms persist or worsen.      Other orders  -     doxycycline (MONODOX) 100 MG capsule; Take 1 capsule by mouth 2 (Two) Times a Day.  Dispense: 20 capsule; Refill: 0  -     methylPREDNISolone (MEDROL) 4 MG dose pack; Take as directed on package instructions.  Dispense: 21 tablet; Refill: 0  -     Chlorcyclizine-Pseudoephed (Stahist AD) 25-60 MG tablet; Take 1 tablet by mouth 2 (Two) Times a Day.  Dispense: 30 tablet; Refill: 0  -     Scopolamine 1 MG/3DAYS patch; Place 1 patch on the skin as directed by provider Every 72 (Seventy-Two) Hours.  Dispense: 5 each; Refill: 0        Follow Up   No follow-ups on  file.  Patient was given instructions and counseling regarding her condition or for health maintenance advice. Please see specific information pulled into the AVS if appropriate.     Andreia Vargas PA-C

## 2024-11-25 NOTE — PROGRESS NOTES
Specialty Pharmacy Refill Coordination Note     Molly is a 49 y.o. female contacted today regarding refills of Emgality specialty medication(s).    Reviewed and verified with patient:       Specialty medication(s) and dose(s) confirmed: yes    Refill Questions      Flowsheet Row Most Recent Value   Changes to allergies? No   Changes to medications? No   New conditions or infections since last clinic visit No   Unplanned office visit, urgent care, ED, or hospital admission in the last 4 weeks  No   How does patient/caregiver feel medication is working? Very good   Financial problems or insurance changes  No   Since the previous refill, were any specialty medication doses or scheduled injections missed or delayed?  No   Does this patient require a clinical escalation to a pharmacist? No            Delivery Questions      Flowsheet Row Most Recent Value   Delivery method UPS   Delivery address verified with patient/caregiver? Yes   Delivery address Home   Number of medications in delivery 1   Medication(s) being filled and delivered Galcanezumab-gnlm (Emgality)   Doses left of specialty medications 0   Copay verified? Yes   Copay amount 0.00   Copay form of payment No copayment ($0)   Ship Date 11/25   Delivery Date 11/26   Signature Required No                   Follow-up: 28 day(s)     Kay Mendes, Pharmacy Technician  Specialty Pharmacy Technician

## 2024-11-25 NOTE — ASSESSMENT & PLAN NOTE
Covid/Flu tests are negative. Rx for doxycycline, Medrol Dosepak and Stahist AD and advised patient to call or return if symptoms persist or worsen.

## 2024-11-26 ENCOUNTER — SPECIALTY PHARMACY (OUTPATIENT)
Dept: GENERAL RADIOLOGY | Facility: HOSPITAL | Age: 49
End: 2024-11-26
Payer: COMMERCIAL

## 2024-11-26 RX ORDER — ATOGEPANT 60 MG/1
60 TABLET ORAL DAILY
Qty: 30 TABLET | Refills: 11 | Status: SHIPPED | OUTPATIENT
Start: 2024-11-26

## 2024-11-26 NOTE — PROGRESS NOTES
Specialty Pharmacy Patient Management Program  Neurology Reassessment     Molly Ferguson is a 49 y.o. female with migraines seen by a Neurology provider and enrolled in the Neurology Patient Management program offered by [facility].  A follow-up outreach was conducted, including assessment of continued therapy appropriateness, medication adherence, and side effect incidence and management for Botox, Emgality, Qulipta, and Zavzpret.     Changes to Insurance Coverage or Financial Support  No changes    [facility] [employer]  Botox, Emgality, Qulipta, and Zavzpret Copay Cards       Relevant Past Medical History and Comorbidities  Relevant medical history and concomitant health conditions were discussed with the patient. The patient's chart has been reviewed for relevant past medical history and comorbid health conditions and updated as necessary.   Past Medical History:   Diagnosis Date    Anxiety     Depression     Depressive disorder     GERD (gastroesophageal reflux disease)     History of anxiety     History of hyperlipidemia     Low back pain     Migraines     Obesity     Patellar malalignment syndrome of left knee     PONV (postoperative nausea and vomiting)     Tennis elbow     Trigger thumb     Visual disturbances      Social History     Socioeconomic History    Marital status:    Tobacco Use    Smoking status: Former     Current packs/day: 0.00     Average packs/day: 1.3 packs/day for 7.5 years (10.0 ttl pk-yrs)     Types: Cigarettes     Start date: 2018     Quit date: 2023     Years since quittin.5    Smokeless tobacco: Never   Vaping Use    Vaping status: Never Used   Substance and Sexual Activity    Alcohol use: Not Currently     Comment: Once every six months or so    Drug use: Never    Sexual activity: Yes     Partners: Male     Birth control/protection: Bilateral salpingectomy      Comment: Hysterectomy     Problem list reviewed by Tahmina Barriga [facility] on  11/26/2024 at  9:44 AM    Hospitalizations and Urgent Care Since Last Assessment  ED Visits, Admissions, or Hospitalizations: None   Urgent Office Visits: None     Allergies  Known allergies and reactions were discussed with the patient. The patient's chart has been reviewed for allergy information and updated as necessary.   Allergies   Allergen Reactions    Codeine Shortness Of Breath    Nickel Hives    Penicillins Rash    Morphine Rash    Morphine And Codeine GI Intolerance     Allergies reviewed by Tahmina Barriga RPH on 11/26/2024 at  9:38 AM  Allergies reviewed by Tahmina Barriga RPH on 11/26/2024 at  9:43 AM    Relevant Laboratory Values  Common labs          10/29/2024    10:39   Common Labs   Glucose 82    BUN 14    Creatinine 0.91    Sodium 143    Potassium 3.3    Chloride 105    Calcium 9.6    Total Protein 7.0    Albumin 4.3    Total Bilirubin <0.2    Alkaline Phosphatase 66    AST (SGOT) 16    ALT (SGPT) 11    WBC 9.9    Hemoglobin 11.9    Hematocrit 38.0    Platelets 443        Current Medication List  This medication list has been reviewed with the patient and evaluated for any interactions or necessary modifications/recommendations, and updated to include all prescription medications, OTC medications, and supplements the patient is currently taking.  This list reflects what is contained in the patient's profile, which has also been marked as reviewed to communicate to other providers it is the most up to date version of the patient's current medication therapy.     Current Outpatient Medications:     Atogepant (Qulipta) 60 MG tablet, Take 1 tablet by mouth Daily., Disp: 30 tablet, Rfl: 11    buPROPion XL (WELLBUTRIN XL) 300 MG 24 hr tablet, TAKE 1 TABLET BY MOUTH DAILY, Disp: 90 tablet, Rfl: 1    busPIRone (BUSPAR) 10 MG tablet, Take 1 tablet by mouth Daily., Disp: , Rfl:     Cequa 0.09 % solution, INSTILL 1 DROP IN BOTH EYES TWICE A DAY, Disp: , Rfl:     Chlorcyclizine-Pseudoephed (Stahist  AD) 25-60 MG tablet, Take 1 tablet by mouth 2 (Two) Times a Day., Disp: 30 tablet, Rfl: 0    cyclobenzaprine (FLEXERIL) 10 MG tablet, Take 1 tablet by mouth 2 (Two) Times a Day., Disp: 60 tablet, Rfl: 5    dicyclomine (BENTYL) 20 MG tablet, Take 1 tablet by mouth Every 6 (Six) Hours. AC and HS, Disp: 120 tablet, Rfl: 5    doxycycline (MONODOX) 100 MG capsule, Take 1 capsule by mouth 2 (Two) Times a Day., Disp: 20 capsule, Rfl: 0    Emgality 120 MG/ML solution prefilled syringe, Inject 1 mL under the skin into the appropriate area as directed Every 30 (Thirty) Days., Disp: 1 mL, Rfl: 11    estradiol 1 MG/GM gel, , Disp: , Rfl:     gabapentin (NEURONTIN) 600 MG tablet, TAKE 2 TABLETS BY MOUTH EVERY MORNING AND 1 AT NOON AND 2 EVERY EVENING, Disp: 150 tablet, Rfl: 3    methylPREDNISolone (MEDROL) 4 MG dose pack, Take as directed on package instructions., Disp: 21 tablet, Rfl: 0    OnabotulinumtoxinA 200 units reconstituted solution, Inject 200 units IM into head neck shoulders every 12 weeks per FDA protocol Dx G43.719 SHIP TO SANDRITA HOLDEN., Disp: 1 each, Rfl: 3    ondansetron (ZOFRAN) 4 MG tablet, TAKE 1 TABLET BY MOUTH EVERY 8 HOURS AS NEEDED FOR NAUSEA AND/OR VOMITING, Disp: 18 tablet, Rfl: 1    pantoprazole (PROTONIX) 40 MG EC tablet, TAKE 1 TABLET BY MOUTH DAILY, Disp: 90 tablet, Rfl: 2    Scopolamine 1 MG/3DAYS patch, Place 1 patch on the skin as directed by provider Every 72 (Seventy-Two) Hours., Disp: 5 each, Rfl: 0    SUMAtriptan (IMITREX) 100 MG tablet, TAKE 1 TABLET BY MOUTH AT ONSET OF MIGRAINE; MAY REPEAT 1 TABLET IN 2 HOURS IF NEEDED. MAX 2 PER DAY, Disp: 9 tablet, Rfl: 1    SUMAtriptan Succinate (IMITREX) 6 MG/0.5ML injection, INJECT PRESCRIBED DOSE AT ONSET OF HEADACHE. MAY REPEAT DOSE ONE TIME IN 1 HOUR IF HEADACHE NOT RELIEVED, Disp: 1 mL, Rfl: 6    Topiramate  MG capsule sustained-release 24 hr, TAKE 1 CAPSULE BY MOUTH DAILY, Disp: 90 capsule, Rfl: 0    traZODone (DESYREL) 100 MG tablet,  TAKE ONE TABLET BY MOUTH ONCE NIGHTLY, Disp: 90 tablet, Rfl: 1    ubrogepant (UBRELVY) 50 MG tablet, Take 2 tablets by mouth As Needed (As needed) for up to 1 dose., Disp: 10 tablet, Rfl: 3    vilazodone (VIIBRYD) 20 MG tablet tablet, Take 1 tablet by mouth Daily. Patient needs appointment for annual physical, Disp: 90 tablet, Rfl: 0    Zavegepant HCl (Zavzpret) 10 MG/ACT solution, Administer 1 spray in nostril As Needed for migraine. Maximum of one spray (10 mg) per 24 hours., Disp: 6 each, Rfl: 3    Current Facility-Administered Medications:     OnabotulinumtoxinA 200 Units, 200 Units, Intramuscular, Q3 Months, Darrell Del Valle MD, 200 Units at 09/11/24 1624    OnabotulinumtoxinA 200 Units, 200 Units, Intramuscular, Q3 Months, Darrell Del Valle MD    Medicines reviewed by Tahmina Barriga [facility] on 11/26/2024 at  9:38 AM  Medicines reviewed by Tahmina Barriga RP on 11/26/2024 at  9:43 AM    Drug Interactions  None     Adverse Drug Reactions  Medication tolerability: Tolerating with no to minimal ADRs          Medication plan: Continue therapy with normal follow-up  Plan for ADR Management: Not required      Adherence, Self-Administration, and Current Therapy Problems  Adherence related patient's specialty therapy was discussed with the patient. The Adherence segment of this outreach has been reviewed and updated.   Adherence Questions  Linked Medication(s) Assessed: Atogepant (Qulipta), Galcanezumab-gnlm (Emgality), OnabotulinumtoxinA  On average, how many doses/injections does the patient miss per month?: 0  What are the identified reasons for non-adherence or missed doses? : no problems identified  What is the estimated medication adherence level?: %  Based on the patient/caregiver response and refill history, does this patient require an MTP to track adherence improvements?: no    Additional Barriers to Patient Self-Administration: None  Methods for Supporting Patient Self-Administration: Not  required    Recently Close Medication Therapy Problems  No medication therapy recommendations to display  Open Medication Therapy Problems  No medication therapy recommendations to display     Goals of Therapy  Goals related to the patient's specialty therapy was discussed with the patient. The Patient Goals segment of this outreach has been reviewed and updated.    Goals Addressed Today        Specialty Pharmacy General Goal      On Average, Reduce:   Frequency of migraines to 6-7 per month.   Symptom severity by 50% within 1 hours of taking acute therapy.   Duration of migraines to 2 hours.     Baseline Values/Notes on Enrollment  Frequency: 12-14 MMD  Symptom Severity: Moderate to severe pain,   Duration: Several hours    Date of Reassessment Notes on Progress Toward Above Goals   11/26/24 4-5 MMD                                                        Quality of Life Assessment   Quality of Life related to the patient's enrollment in the patient management program and services provided was discussed with the patient. The QOL segment of this outreach has been reviewed and updated.   Quality of Life Improvement Scale: 9-A good deal better    Reassessment Plan & Follow-Up  Medication Therapy Changes: Continue Emgality 120 mg subcutaneously monthly, Qulipta 60 mg PO daily, Botox 155 units IM to be administered by Provider in office every 3 months, and Zavzpret 1 spray (10 mg) in 1 nostril as needed for migraine as a single dose. Do not use more than 1 single spray in a 24 hour period.  Related Plans, Therapy Recommendations, or Issues to Be Addressed: None  Pharmacist to perform regular reassessments no more than (6) months from the previous assessment.  Care Coordinator to set up future refill outreaches, coordinate prescription delivery, and escalate clinical questions to pharmacist.     Attestation  Therapeutic appropriateness: Appropriate  I attest the patient was actively involved in and has agreed to the above  plan of care. If the prescribed therapy is at any point deemed not appropriate based on the current or future assessments, a consultation will be initiated with the patient's specialty care provider to determine the best course of action. The revised plan of therapy will be documented along with any additional patient education provided. Discussed aforementioned material with patient via telemedicine.    Jeovanny McgheeD, JOSH  Clinic Specialty Pharmacist, Neurology  11/26/2024  09:44 EST

## 2024-11-26 NOTE — PROGRESS NOTES
Specialty Pharmacy Refill Coordination Note     Molly is a 49 y.o. female contacted today regarding refills of  Emgality and Zavzpret specialty medication(s).    Reviewed and verified with patient:  Allergies  Meds  Problems       Specialty medication(s) and dose(s) confirmed: yes    Refill Questions      Flowsheet Row Most Recent Value   Changes to allergies? No   Changes to medications? No   New conditions or infections since last clinic visit No   Unplanned office visit, urgent care, ED, or hospital admission in the last 4 weeks  No   How does patient/caregiver feel medication is working? Very good   Financial problems or insurance changes  No   Since the previous refill, were any specialty medication doses or scheduled injections missed or delayed?  No   Does this patient require a clinical escalation to a pharmacist? No            Delivery Questions      Flowsheet Row Most Recent Value   Delivery method UPS   Delivery address verified with patient/caregiver? Yes   Delivery address Home   Number of medications in delivery 3   Medication(s) being filled and delivered Zavegepant HCl (Zavzpret), Galcanezumab-gnlm (Emgality), Atogepant (Qulipta)   Doses left of specialty medications Zavzpret - 1,  Emgality - 0,  Qulipta - 4   Copay verified? Yes   Copay amount $0   Copay form of payment No copayment ($0)   Ship Date 11/26/24   Delivery Date 11/27/24   Signature Required No                   Follow-up: 28 day(s)     Tahmina Barriga [facility]

## 2024-12-06 ENCOUNTER — SPECIALTY PHARMACY (OUTPATIENT)
Dept: NEUROLOGY | Facility: CLINIC | Age: 49
End: 2024-12-06
Payer: COMMERCIAL

## 2024-12-06 NOTE — PROGRESS NOTES
Specialty Pharmacy Refill Coordination Note     Molly is a 49 y.o. female contacted today regarding refills of  Qulipta specialty medication(s).    Reviewed and verified with patient:       Specialty medication(s) and dose(s) confirmed: yes    Refill Questions      Flowsheet Row Most Recent Value   Changes to allergies? No   Changes to medications? No   New conditions or infections since last clinic visit No   Unplanned office visit, urgent care, ED, or hospital admission in the last 4 weeks  No   How does patient/caregiver feel medication is working? Very good   Financial problems or insurance changes  No   Since the previous refill, were any specialty medication doses or scheduled injections missed or delayed?  No   Does this patient require a clinical escalation to a pharmacist? No            Delivery Questions      Flowsheet Row Most Recent Value   Delivery method UPS   Delivery address verified with patient/caregiver? Yes   Delivery address Home   Number of medications in delivery 1   Medication(s) being filled and delivered Atogepant (Qulipta)   Doses left of specialty medications 5   Copay verified? Yes   Copay amount $0   Copay form of payment No copayment ($0)   Ship Date 12/9/24   Delivery Date 12/10/24   Signature Required No                   Follow-up: 28 day(s)     Tahmina Barriga [facility]

## 2024-12-11 ENCOUNTER — PROCEDURE VISIT (OUTPATIENT)
Dept: NEUROLOGY | Facility: CLINIC | Age: 49
End: 2024-12-11
Payer: COMMERCIAL

## 2024-12-11 DIAGNOSIS — G43.719 INTRACTABLE CHRONIC MIGRAINE WITHOUT AURA AND WITHOUT STATUS MIGRAINOSUS: Primary | ICD-10-CM

## 2024-12-21 DIAGNOSIS — G43.719 INTRACTABLE CHRONIC MIGRAINE WITHOUT AURA AND WITHOUT STATUS MIGRAINOSUS: ICD-10-CM

## 2024-12-23 RX ORDER — SUMATRIPTAN SUCCINATE 100 MG/1
TABLET ORAL
Qty: 9 TABLET | Refills: 1 | Status: SHIPPED | OUTPATIENT
Start: 2024-12-23

## 2024-12-23 NOTE — TELEPHONE ENCOUNTER
Rx Refill Note  Requested Prescriptions     Pending Prescriptions Disp Refills    Topiramate  MG capsule sustained-release 24 hr [Pharmacy Med Name: TOPIRAMATE  MG CAPSULE] 90 capsule 0     Sig: TAKE 1 CAPSULE BY MOUTH DAILY    SUMAtriptan (IMITREX) 100 MG tablet [Pharmacy Med Name: SUMAtriptan SUCC 100 MG TABLET] 9 tablet 1     Sig: TAKE 1 TABLET BY MOUTH AT ONSET OF MIGRAINE; MAY REPEAT 1 TABLET IN 2 HOURS IF NEEDED.      Last filled:      Topamax 10/9/24 90 days 0 refills  Imitrex 10/9/24 #9 with 1 refill    Last office visit with prescribing clinician: Visit date not found      Next office visit with prescribing clinician: 3/5/2025     Luis Jesus MA  12/23/24, 13:14 EST   Arava Counseling:  Patient counseled regarding adverse effects of Arava including but not limited to nausea, vomiting, abnormalities in liver function tests. Patients may develop mouth sores, rash, diarrhea, and abnormalities in blood counts. The patient understands that monitoring is required including LFTs and blood counts.  There is a rare possibility of scarring of the liver and lung problems that can occur when taking methotrexate. Persistent nausea, loss of appetite, pale stools, dark urine, cough, and shortness of breath should be reported immediately. Patient advised to discontinue Arava treatment and consult with a physician prior to attempting conception. The patient will have to undergo a treatment to eliminate Arava from the body prior to conception.

## 2024-12-30 ENCOUNTER — SPECIALTY PHARMACY (OUTPATIENT)
Dept: GENERAL RADIOLOGY | Facility: HOSPITAL | Age: 49
End: 2024-12-30
Payer: COMMERCIAL

## 2024-12-30 RX ORDER — ZAVEGEPANT 10 MG/.1ML
1 SPRAY NASAL AS NEEDED
Qty: 6 EACH | Refills: 11 | Status: SHIPPED | OUTPATIENT
Start: 2024-12-30

## 2024-12-30 RX ORDER — TOPIRAMATE 200 MG/1
1 CAPSULE, EXTENDED RELEASE ORAL DAILY
Qty: 90 CAPSULE | Refills: 1 | Status: SHIPPED | OUTPATIENT
Start: 2024-12-30

## 2024-12-30 RX ORDER — GALCANEZUMAB 120 MG/ML
120 INJECTION, SOLUTION SUBCUTANEOUS
Qty: 1 ML | Refills: 11 | Status: SHIPPED | OUTPATIENT
Start: 2024-12-30

## 2024-12-30 NOTE — PROGRESS NOTES
Specialty Pharmacy Refill Coordination Note     Molly is a 49 y.o. female contacted today regarding refills of Emgality (due 1/1) and Zavzpret PRN specialty medication(s).    Reviewed and verified with patient:       Specialty medication(s) and dose(s) confirmed: yes    Refill Questions      Flowsheet Row Most Recent Value   Changes to allergies? No   Changes to medications? Yes  [currently taking ibuprofen scheduled daily for foot surgery she had before Christmas---causing a few extra migraines potentially from rebound headaches--she will be weaning this down soon]   New conditions or infections since last clinic visit No  [had foot surgery week before Christmas]   Unplanned office visit, urgent care, ED, or hospital admission in the last 4 weeks  No   How does patient/caregiver feel medication is working? Very good   Financial problems or insurance changes  No   Since the previous refill, were any specialty medication doses or scheduled injections missed or delayed?  No   Does this patient require a clinical escalation to a pharmacist? No  [currently taking ibuprofen scheduled daily for foot surgery she had before Christmas---causing a few extra migraines potentially from rebound headaches--she will be weaning this down soon]            Delivery Questions      Flowsheet Row Most Recent Value   Delivery method UPS   Delivery address verified with patient/caregiver? Yes   Delivery address Home   Number of medications in delivery 2   Medication(s) being filled and delivered Zavegepant HCl (Zavzpret), Galcanezumab-gnlm (Emgality)   Doses left of specialty medications out of Zavzpret, Emgality due 1/1   Copay verified? Yes   Copay amount $0 both (met deductible for 2024)   Copay form of payment No copayment ($0)   Ship Date 12/30   Delivery Date 12/31   Signature Required No                   Follow-up: 1 month(s)     Berta Davis, JeovannyD

## 2024-12-31 ENCOUNTER — SPECIALTY PHARMACY (OUTPATIENT)
Dept: GENERAL RADIOLOGY | Facility: HOSPITAL | Age: 49
End: 2024-12-31
Payer: COMMERCIAL

## 2024-12-31 RX ORDER — CEFUROXIME AXETIL 250 MG/1
TABLET ORAL
Qty: 1 ML | Refills: 6 | Status: SHIPPED | OUTPATIENT
Start: 2024-12-31

## 2024-12-31 NOTE — PROGRESS NOTES
Specialty Pharmacy Refill Coordination Note     Molly is a 49 y.o. female contacted today regarding refills of  Qulipta specialty medication(s).    Reviewed and verified with patient:       Specialty medication(s) and dose(s) confirmed: yes    Refill Questions      Flowsheet Row Most Recent Value   Changes to allergies? No   Changes to medications? No   New conditions or infections since last clinic visit No   Unplanned office visit, urgent care, ED, or hospital admission in the last 4 weeks  No   How does patient/caregiver feel medication is working? Very good   Financial problems or insurance changes  No   Since the previous refill, were any specialty medication doses or scheduled injections missed or delayed?  No   Does this patient require a clinical escalation to a pharmacist? No            Delivery Questions      Flowsheet Row Most Recent Value   Delivery method UPS   Delivery address verified with patient/caregiver? Yes   Delivery address Home   Number of medications in delivery 1   Medication(s) being filled and delivered Atogepant (Qulipta)   Doses left of specialty medications 5   Copay verified? Yes   Copay amount $0   Copay form of payment No copayment ($0)   Ship Date 1/2/25   Delivery Date 1/3/25   Signature Required No                   Follow-up: 28 day(s)     Tahmina Barriga [facility]

## 2024-12-31 NOTE — TELEPHONE ENCOUNTER
Rx Refill Note  Requested Prescriptions     Pending Prescriptions Disp Refills    SUMAtriptan Succinate (IMITREX) 6 MG/0.5ML injection [Pharmacy Med Name: SUMAtriptan 6 MG/0.5 ML INJECT] 1 mL 6     Sig: INJECT ONE INJECTOR UNDER THE SKIN AT ONSET OF HEADACHE, MAY REPEAT DOSE ONE TIME IN 1 HOUR IF HEADEACH NOT RELIEVED.      Last filled: 12/20/23 #1 with 6 refills    Last office visit with prescribing clinician: Visit date not found      Next office visit with prescribing clinician: 3/5/2025     Luis Jesus MA  12/31/24, 10:09 EST

## 2025-01-27 ENCOUNTER — SPECIALTY PHARMACY (OUTPATIENT)
Dept: NEUROLOGY | Facility: CLINIC | Age: 50
End: 2025-01-27
Payer: COMMERCIAL

## 2025-01-27 NOTE — PROGRESS NOTES
Specialty Pharmacy Refill Coordination Note     Molly is a 49 y.o. female contacted today regarding refills of  Emgality and Zavzpret specialty medication(s).    Reviewed and verified with patient: 2  Specialty medication(s) and dose(s) confirmed: yes    Refill Questions      Flowsheet Row Most Recent Value   Changes to allergies? No   Changes to medications? No   New conditions or infections since last clinic visit No   Unplanned office visit, urgent care, ED, or hospital admission in the last 4 weeks  No   How does patient/caregiver feel medication is working? Good   Financial problems or insurance changes  No   Since the previous refill, were any specialty medication doses or scheduled injections missed or delayed?  No   Does this patient require a clinical escalation to a pharmacist? No            Delivery Questions      Flowsheet Row Most Recent Value   Delivery method UPS   Delivery address verified with patient/caregiver? Yes   Delivery address Home   Number of medications in delivery 2   Medication(s) being filled and delivered Galcanezumab-gnlm (Emgality), Zavegepant HCl (Zavzpret)   Doses left of specialty medications Emgality - 3, Zavzpret - 3   Copay verified? Yes   Copay amount $35   Copay form of payment Credit/debit on file   Ship Date 1/27   Delivery Date Selection 01/28/25   Signature Required No                   Follow-up: 28 day(s)     Al Bearden, Pharmacy Technician

## 2025-02-05 ENCOUNTER — SPECIALTY PHARMACY (OUTPATIENT)
Dept: NEUROLOGY | Facility: CLINIC | Age: 50
End: 2025-02-05
Payer: COMMERCIAL

## 2025-02-06 ENCOUNTER — SPECIALTY PHARMACY (OUTPATIENT)
Dept: NEUROLOGY | Facility: CLINIC | Age: 50
End: 2025-02-06
Payer: COMMERCIAL

## 2025-02-10 ENCOUNTER — SPECIALTY PHARMACY (OUTPATIENT)
Dept: NEUROLOGY | Facility: CLINIC | Age: 50
End: 2025-02-10
Payer: COMMERCIAL

## 2025-02-10 NOTE — PROGRESS NOTES
Specialty Pharmacy Refill Coordination Note     Molly is a 49 y.o. female contacted today regarding refills of  Qulipta specialty medication(s).    Reviewed and verified with patient: 1  Specialty medication(s) and dose(s) confirmed: yes    Refill Questions      Flowsheet Row Most Recent Value   Changes to allergies? No   Changes to medications? No   New conditions or infections since last clinic visit No   Unplanned office visit, urgent care, ED, or hospital admission in the last 4 weeks  No   How does patient/caregiver feel medication is working? Good   Financial problems or insurance changes  No   Since the previous refill, were any specialty medication doses or scheduled injections missed or delayed?  No   Does this patient require a clinical escalation to a pharmacist? No            Delivery Questions      Flowsheet Row Most Recent Value   Delivery method UPS   Delivery address verified with patient/caregiver? Yes   Delivery address Home   Number of medications in delivery 1   Medication(s) being filled and delivered Atogepant (Qulipta)   Doses left of specialty medications 3   Copay verified? Yes   Copay amount $0   Copay form of payment No copayment ($0)   Ship Date 2/10   Delivery Date Selection 02/11/25   Signature Required No                   Follow-up: 28 day(s)     Al Bearden, Pharmacy Technician

## 2025-02-11 ENCOUNTER — SPECIALTY PHARMACY (OUTPATIENT)
Dept: GENERAL RADIOLOGY | Facility: HOSPITAL | Age: 50
End: 2025-02-11
Payer: COMMERCIAL

## 2025-02-11 NOTE — PROGRESS NOTES
Specialty Pharmacy Patient Management Program  Refill Outreach     Molly was contacted today regarding refills of their medication(s).    Refill Questions      Flowsheet Row Most Recent Value   Changes to allergies? No   Changes to medications? No   New conditions or infections since last clinic visit No   Unplanned office visit, urgent care, ED, or hospital admission in the last 4 weeks  No   How does patient/caregiver feel medication is working? Very good   Financial problems or insurance changes  No   Since the previous refill, were any specialty medication doses or scheduled injections missed or delayed?  No   Does this patient require a clinical escalation to a pharmacist? No            Delivery Questions      Flowsheet Row Most Recent Value   Delivery method UPS   Delivery address verified with patient/caregiver? Yes   Delivery address Home   Number of medications in delivery 1   Medication(s) being filled and delivered OnabotulinumtoxinA   Doses left of specialty medications 0   Copay verified? Yes   Copay amount $0   Copay form of payment No copayment ($0)   Ship Date 2/12/25   Delivery Date Selection 02/13/25   Signature Required Yes                 Follow-up: 84 day(s)     Tahmina Barriga RP  2/11/2025  11:19 EST

## 2025-02-19 RX ORDER — VILAZODONE HYDROCHLORIDE 20 MG/1
20 TABLET ORAL DAILY
Qty: 90 TABLET | Refills: 0 | Status: SHIPPED | OUTPATIENT
Start: 2025-02-19

## 2025-02-24 ENCOUNTER — SPECIALTY PHARMACY (OUTPATIENT)
Dept: NEUROLOGY | Facility: CLINIC | Age: 50
End: 2025-02-24
Payer: COMMERCIAL

## 2025-02-24 NOTE — PROGRESS NOTES
Specialty Pharmacy Patient Management Program  Refill Outreach     Molly was contacted today regarding refills of their medication(s).    Refill Questions      Flowsheet Row Most Recent Value   Changes to allergies? No   Changes to medications? No   New conditions or infections since last clinic visit No   Unplanned office visit, urgent care, ED, or hospital admission in the last 4 weeks  No   How does patient/caregiver feel medication is working? Good   Financial problems or insurance changes  No   Since the previous refill, were any specialty medication doses or scheduled injections missed or delayed?  No   Does this patient require a clinical escalation to a pharmacist? No            Delivery Questions      Flowsheet Row Most Recent Value   Delivery method UPS   Delivery address verified with patient/caregiver? Yes   Delivery address Home   Number of medications in delivery 3   Medication(s) being filled and delivered Galcanezumab-gnlm (Emgality), Zavegepant HCl (Zavzpret)   Doses left of specialty medications Emgality 3, Zavzpret 4   Copay verified? Yes   Copay amount $35   Copay form of payment Credit/debit on file   Delivery Date Selection 02/25/25   Signature Required No                 Follow-up: 28 day(s)     Al Bearden, Pharmacy Technician  2/24/2025  09:02 EST

## 2025-03-05 ENCOUNTER — PROCEDURE VISIT (OUTPATIENT)
Dept: NEUROLOGY | Facility: CLINIC | Age: 50
End: 2025-03-05
Payer: COMMERCIAL

## 2025-03-05 DIAGNOSIS — G43.719 INTRACTABLE CHRONIC MIGRAINE WITHOUT AURA AND WITHOUT STATUS MIGRAINOSUS: Primary | ICD-10-CM

## 2025-03-05 RX ORDER — BUDESONIDE 3 MG/1
9 CAPSULE, COATED PELLETS ORAL DAILY
COMMUNITY
Start: 2025-02-22

## 2025-03-06 ENCOUNTER — SPECIALTY PHARMACY (OUTPATIENT)
Dept: NEUROLOGY | Facility: CLINIC | Age: 50
End: 2025-03-06
Payer: COMMERCIAL

## 2025-03-06 NOTE — PROGRESS NOTES
Specialty Pharmacy Patient Management Program  Refill Outreach     Molly was contacted today regarding refills of their medication(s).    Refill Questions      Flowsheet Row Most Recent Value   Changes to allergies? No   Changes to medications? No   New conditions or infections since last clinic visit No   Unplanned office visit, urgent care, ED, or hospital admission in the last 4 weeks  No   How does patient/caregiver feel medication is working? Good   Financial problems or insurance changes  No   Since the previous refill, were any specialty medication doses or scheduled injections missed or delayed?  No   Does this patient require a clinical escalation to a pharmacist? No            Delivery Questions      Flowsheet Row Most Recent Value   Delivery method UPS   Delivery address verified with patient/caregiver? Yes   Delivery address Home   Number of medications in delivery 1   Medication(s) being filled and delivered Atogepant (Qulipta)   Doses left of specialty medications 4   Copay verified? Yes   Copay amount $0   Copay form of payment No copayment ($0)   Delivery Date Selection 03/07/25   Signature Required No                 Follow-up: 28 day(s)     Al Bearden, Pharmacy Technician  3/6/2025  11:12 EST

## 2025-03-07 ENCOUNTER — OFFICE VISIT (OUTPATIENT)
Dept: FAMILY MEDICINE CLINIC | Facility: CLINIC | Age: 50
End: 2025-03-07
Payer: COMMERCIAL

## 2025-03-07 VITALS
OXYGEN SATURATION: 100 % | SYSTOLIC BLOOD PRESSURE: 110 MMHG | HEART RATE: 82 BPM | WEIGHT: 129.5 LBS | DIASTOLIC BLOOD PRESSURE: 80 MMHG | BODY MASS INDEX: 25.42 KG/M2 | HEIGHT: 60 IN

## 2025-03-07 DIAGNOSIS — G47.00 PERSISTENT INSOMNIA: ICD-10-CM

## 2025-03-07 DIAGNOSIS — F33.1 MODERATE EPISODE OF RECURRENT MAJOR DEPRESSIVE DISORDER: Primary | ICD-10-CM

## 2025-03-07 PROBLEM — J01.00 ACUTE NON-RECURRENT MAXILLARY SINUSITIS: Status: RESOLVED | Noted: 2024-11-25 | Resolved: 2025-03-07

## 2025-03-07 RX ORDER — HYDROCORTISONE 25 MG/G
OINTMENT TOPICAL
COMMUNITY
Start: 2025-03-05

## 2025-03-07 RX ORDER — QUETIAPINE FUMARATE 50 MG/1
50 TABLET, EXTENDED RELEASE ORAL NIGHTLY
Qty: 90 EACH | Refills: 1 | Status: SHIPPED | OUTPATIENT
Start: 2025-03-07

## 2025-03-07 NOTE — ASSESSMENT & PLAN NOTE
I suggested trying Seroquel and reducing the Trazodone.  See comments above. I suspect her sleep issues are contributing to her depression.

## 2025-03-07 NOTE — ASSESSMENT & PLAN NOTE
Patient reports that she is feeling very depressed, admits to having suicidal thoughts but never acting upon them, just thoughts that roll through her head, I am recommending adding Seroquel at bedtime and keep appt with counselor.  Follow up here in one month.   I do not believe she is at risk at this time, but I did tell her that if she worsened or felt like she might act upon her thoughts she needed to seek help immediately and she assured me she would.

## 2025-03-07 NOTE — PROGRESS NOTES
"Chief Complaint  sleep issues (Unable to sleep)    Subjective          Molly Ferguson presents to [facility]    History of Present Illness  Patient is here today for sleep issues and progressively worsening depression, she states she is having some personal issues and it is effecting her sleep and her mental health.  Her significant other is very depressed and it is causing some stress between them.  She is scheduled to start counseling next week, but she is not sleeping well, waking up about every hour and often just getting up at 3am because she can't go back to sleep.   Trazodone is no longer helping this.  She has had some suicidal thoughts, which she admits to having off and on throughout her life, but never acted upon.  She says she would never act on them now.  She finds herself crying for no reason.       Objective   Vital Signs:   /80   Pulse 82   Ht 152.4 cm (60\")   Wt 58.7 kg (129 lb 8 oz)   SpO2 100%   BMI 25.29 kg/m²     Body mass index is 25.29 kg/m².    Review of Systems   Constitutional:  Negative for chills, diaphoresis, fatigue and fever.   HENT:  Negative for congestion, sore throat and swollen glands.    Respiratory:  Negative for cough.    Cardiovascular:  Negative for chest pain.   Gastrointestinal:  Negative for abdominal pain, nausea and vomiting.   Genitourinary:  Negative for dysuria.   Musculoskeletal:  Negative for myalgias and neck pain.   Skin:  Negative for rash.   Neurological:  Negative for weakness and numbness.   Psychiatric/Behavioral:  Positive for sleep disturbance, suicidal ideas (has had these off/on for years, never acted upon, would never act upon them.), depressed mood and stress. Negative for self-injury. The patient is not nervous/anxious.        Past History:  Medical History: has a past medical history of Anxiety, Depression, Depressive disorder, GERD (gastroesophageal reflux disease), History of anxiety, History of " PER DAUGHTER/PRAMOD IBANEZ, PATIENT DOES NOT WANT REMDESIVIR MEDICATION BUT DOES
WAS IVERMECTIN. ASKED PATIENT WHAT SHE WOULD WANT, PATIENT TOLD ME SHE WANTS TO
REFUSE MEDICATION UNTIL SHE TALKS WITH HER DAUGHTER. hyperlipidemia, Low back pain, Migraines, Obesity, Patellar malalignment syndrome of left knee, PONV (postoperative nausea and vomiting), Tennis elbow, Trigger thumb, and Visual disturbances.   Surgical History: has a past surgical history that includes Tubal ligation; Hysterectomy; Shoulder surgery (Left, 2020);  section; Colonoscopy; Esophagogastroduodenoscopy; Tennis Elbow Release (Left, 11/10/2020); Essure tubal ligation; and Joint replacement (2023).   Family History: family history includes Alzheimer's disease in some other family members; Arthritis in her father; Breast cancer in her maternal grandmother; COPD in her father; Cancer in an other family member; Dementia in her maternal grandmother; Diabetes in her father; Heart disease in her father and another family member; Hyperlipidemia in her father; Hypertension in her father and maternal grandmother; Parkinsonism in her paternal grandfather and another family member; Skin cancer in her mother.   Social History: reports that she quit smoking about 22 months ago. Her smoking use included cigarettes. She started smoking about 6 years ago. She has a 10 pack-year smoking history. She has never used smokeless tobacco. She reports that she does not currently use alcohol. She reports that she does not use drugs.      Current Outpatient Medications:     Atogepant (Qulipta) 60 MG tablet, Take 1 tablet by mouth Daily., Disp: 30 tablet, Rfl: 11    Budesonide (ENTOCORT EC) 3 MG 24 hr capsule, Take 3 capsules by mouth Daily., Disp: , Rfl:     buPROPion XL (WELLBUTRIN XL) 300 MG 24 hr tablet, TAKE 1 TABLET BY MOUTH DAILY, Disp: 90 tablet, Rfl: 1    busPIRone (BUSPAR) 10 MG tablet, Take 1 tablet by mouth Daily., Disp: , Rfl:     Cequa 0.09 % solution, INSTILL 1 DROP IN BOTH EYES TWICE A DAY, Disp: , Rfl:     cyclobenzaprine (FLEXERIL) 10 MG tablet, Take 1 tablet by mouth 2 (Two) Times a Day., Disp: 60 tablet, Rfl: 5    Emgality 120 MG/ML solution  prefilled syringe, Inject 1 mL under the skin into the appropriate area as directed Every 30 (Thirty) Days., Disp: 1 mL, Rfl: 11    estradiol 1 MG/GM gel, , Disp: , Rfl:     gabapentin (NEURONTIN) 600 MG tablet, TAKE 2 TABLETS BY MOUTH EVERY MORNING AND 1 AT NOON AND 2 EVERY EVENING, Disp: 150 tablet, Rfl: 3    hydrocortisone 2.5 % ointment, APPLY A THIN LAYER TO THE AFFECTED AREA(S) ON FACE BY TOPICAL ROUTE 2 TIMES PER DAY FOR UP TO 10 DAYS., Disp: , Rfl:     metroNIDAZOLE (METROCREAM) 0.75 % cream, , Disp: , Rfl:     OnabotulinumtoxinA 200 units reconstituted solution, Inject 200 units IM into head neck shoulders every 12 weeks per FDA protocol Dx G43.719 SHIP TO SANDRITA [employer]EN., Disp: 1 each, Rfl: 3    ondansetron (ZOFRAN) 4 MG tablet, TAKE 1 TABLET BY MOUTH EVERY 8 HOURS AS NEEDED FOR NAUSEA AND/OR VOMITING, Disp: 18 tablet, Rfl: 1    pantoprazole (PROTONIX) 40 MG EC tablet, TAKE 1 TABLET BY MOUTH DAILY, Disp: 90 tablet, Rfl: 2    SUMAtriptan (IMITREX) 100 MG tablet, TAKE 1 TABLET BY MOUTH AT ONSET OF MIGRAINE; MAY REPEAT 1 TABLET IN 2 HOURS IF NEEDED., Disp: 9 tablet, Rfl: 1    SUMAtriptan Succinate (IMITREX) 6 MG/0.5ML injection, INJECT ONE INJECTOR UNDER THE SKIN AT ONSET OF HEADACHE, MAY REPEAT DOSE ONE TIME IN 1 HOUR IF HEADEACH NOT RELIEVED., Disp: 1 mL, Rfl: 6    Topiramate  MG capsule sustained-release 24 hr, TAKE 1 CAPSULE BY MOUTH DAILY, Disp: 90 capsule, Rfl: 1    traZODone (DESYREL) 100 MG tablet, TAKE ONE TABLET BY MOUTH ONCE NIGHTLY, Disp: 90 tablet, Rfl: 1    ubrogepant (UBRELVY) 50 MG tablet, Take 2 tablets by mouth As Needed (As needed) for up to 1 dose., Disp: 10 tablet, Rfl: 3    vilazodone (VIIBRYD) 20 MG tablet tablet, TAKE 1 TABLET BY MOUTH DAILY, Disp: 90 tablet, Rfl: 0    Zavegepant HCl (Zavzpret) 10 MG/ACT solution, Administer 1 spray in nostril As Needed for migraine. Maximum of one spray (10 mg) per 24 hours., Disp: 6 each, Rfl: 11    Current Facility-Administered Medications:      OnabotulinumtoxinA 155 Units, 155 Units, Intramuscular, Q3 Months, Darrell Del Valle MD, 155 Units at 03/05/25 1645    Allergies: Codeine, Nickel, Penicillins, Morphine, and Morphine and codeine    Physical Exam  Neurological:      General: No focal deficit present.      Mental Status: She is alert and oriented to person, place, and time.   Psychiatric:         Attention and Perception: Attention normal.         Mood and Affect: Mood is depressed. Affect is flat and tearful.         Speech: Speech normal.         Behavior: Behavior normal. Behavior is cooperative.         Thought Content: Thought content normal.         Cognition and Memory: Cognition normal.         Judgment: Judgment normal.          Result Review :                   Assessment and Plan    Diagnoses and all orders for this visit:    1. Moderate episode of recurrent major depressive disorder (Primary)  Assessment & Plan:  Patient reports that she is feeling very depressed, admits to having suicidal thoughts but never acting upon them, just thoughts that roll through her head, I am recommending adding Seroquel at bedtime and keep appt with counselor.  Follow up here in one month.   I do not believe she is at risk at this time, but I did tell her that if she worsened or felt like she might act upon her thoughts she needed to seek help immediately and she assured me she would.        2. Persistent insomnia  Assessment & Plan:  I suggested trying Seroquel and reducing the Trazodone.  See comments above. I suspect her sleep issues are contributing to her depression.           Follow Up   Return in about 4 weeks (around 4/4/2025) for with Thania Vargas.  Patient was given instructions and counseling regarding her condition or for health maintenance advice. Please see specific information pulled into the AVS if appropriate.     Andreia Vargas PA-C

## 2025-03-20 DIAGNOSIS — G43.719 INTRACTABLE CHRONIC MIGRAINE WITHOUT AURA AND WITHOUT STATUS MIGRAINOSUS: ICD-10-CM

## 2025-03-20 RX ORDER — PANTOPRAZOLE SODIUM 40 MG/1
40 TABLET, DELAYED RELEASE ORAL DAILY
Qty: 90 TABLET | Refills: 1 | Status: SHIPPED | OUTPATIENT
Start: 2025-03-20

## 2025-03-20 RX ORDER — SUMATRIPTAN SUCCINATE 100 MG/1
TABLET ORAL
Qty: 9 TABLET | Refills: 1 | Status: SHIPPED | OUTPATIENT
Start: 2025-03-20

## 2025-03-20 NOTE — TELEPHONE ENCOUNTER
Rx Refill Note  Requested Prescriptions     Pending Prescriptions Disp Refills    gabapentin (NEURONTIN) 600 MG tablet [Pharmacy Med Name: GABAPENTIN 600 MG TABLET] 150 tablet      Sig: TAKE 2 TABLETS BY MOUTH EVERY MORNING, 1 TABLET AT NOON, AND 2 EVERY EVENING    SUMAtriptan (IMITREX) 100 MG tablet [Pharmacy Med Name: SUMAtriptan SUCC 100 MG TABLET] 9 tablet 1     Sig: TAKE 1 TABLET BY MOUTH AT ONSET OF MIGRAINE; MAY REPEAT 1 TABLET IN 2 HOURS IF NEEDED.      Last filled:    Gabapentin 10/10/24 for 30 days with 3 refills  Imitrex tab 12/23/24 #9 with 1 refill    Last office visit with prescribing clinician: Visit date not found      Next office visit with prescribing clinician: 5/28/2025     Luis Jesus MA  03/20/25, 15:32 EDT

## 2025-03-21 RX ORDER — GABAPENTIN 600 MG/1
TABLET ORAL
Qty: 150 TABLET | Refills: 3 | Status: SHIPPED | OUTPATIENT
Start: 2025-03-21

## 2025-03-26 ENCOUNTER — SPECIALTY PHARMACY (OUTPATIENT)
Dept: NEUROLOGY | Facility: CLINIC | Age: 50
End: 2025-03-26
Payer: COMMERCIAL

## 2025-03-26 NOTE — PROGRESS NOTES
Specialty Pharmacy Patient Management Program  Refill Outreach     Molly was contacted today regarding refills of their medication(s).    Refill Questions      Flowsheet Row Most Recent Value   Changes to allergies? No   Changes to medications? No   New conditions or infections since last clinic visit No   Unplanned office visit, urgent care, ED, or hospital admission in the last 4 weeks  No   How does patient/caregiver feel medication is working? Good   Financial problems or insurance changes  No   Since the previous refill, were any specialty medication doses or scheduled injections missed or delayed?  No   Does this patient require a clinical escalation to a pharmacist? No            Delivery Questions      Flowsheet Row Most Recent Value   Delivery method UPS   Delivery address verified with patient/caregiver? Yes   Delivery address Home   Number of medications in delivery 1   Medication(s) being filled and delivered Galcanezumab-gnlm (Emgality), Zavegepant HCl (Zavzpret)   Doses left of specialty medications Emglaity 4, Zavzpret 4   Copay verified? Yes   Copay amount $35   Copay form of payment Credit/debit on file   Delivery Date Selection 03/27/25   Signature Required No                 Follow-up: 28 day(s)     Al Bearden, Pharmacy Technician  3/26/2025  10:19 EDT

## 2025-04-02 ENCOUNTER — SPECIALTY PHARMACY (OUTPATIENT)
Dept: NEUROLOGY | Facility: CLINIC | Age: 50
End: 2025-04-02
Payer: COMMERCIAL

## 2025-04-03 ENCOUNTER — TELEMEDICINE (OUTPATIENT)
Dept: FAMILY MEDICINE CLINIC | Facility: CLINIC | Age: 50
End: 2025-04-03
Payer: COMMERCIAL

## 2025-04-03 VITALS — WEIGHT: 129.41 LBS | HEIGHT: 60 IN | BODY MASS INDEX: 25.41 KG/M2

## 2025-04-03 DIAGNOSIS — G47.00 PERSISTENT INSOMNIA: Primary | ICD-10-CM

## 2025-04-03 DIAGNOSIS — F33.1 MODERATE EPISODE OF RECURRENT MAJOR DEPRESSIVE DISORDER: ICD-10-CM

## 2025-04-03 PROCEDURE — 99212 OFFICE O/P EST SF 10 MIN: CPT | Performed by: PHYSICIAN ASSISTANT

## 2025-04-03 NOTE — PROGRESS NOTES
"Chief Complaint  Follow-up (Medication follow up \"Quetiapine\")    Subjective          Molly Ferguson presents to [facility]    Patient was seen today through   audio  technolog,  Due to techinical difficulties patient was unable to connect to video either through My Chart or via link. Verbal consent was obtained to complete the call via phone only. For the purpose of this visit the provider is located at [facility].  The patient was located at home. Vitals signs were not obtained due to lack of home monitoring access. Time spent with patient was 10 minutes.    History of Present Illness patient reports that the Seroquel is working very well and she is sleeping much better and that has helped her feel better but she also reached out and started counseling and that seems to be helping and she feels like she is on the road to mental health recovery.    Objective   Vital Signs:   Ht 152.4 cm (60\")   Wt 58.7 kg (129 lb 6.6 oz)   BMI 25.27 kg/m²     Body mass index is 25.27 kg/m².    Review of Systems   Constitutional:  Negative for chills, fatigue and fever.   Respiratory:  Negative for cough and shortness of breath.    Cardiovascular:  Negative for chest pain and palpitations.   Musculoskeletal:  Negative for back pain and myalgias.   Neurological:  Negative for dizziness and headache.   Psychiatric/Behavioral:  Negative for sleep disturbance and depressed mood. The patient is not nervous/anxious.        Past History:  Medical History: has a past medical history of Anxiety, Depression, Depressive disorder, GERD (gastroesophageal reflux disease), History of anxiety, History of hyperlipidemia, Low back pain, Migraines, Obesity, Patellar malalignment syndrome of left knee, PONV (postoperative nausea and vomiting), Tennis elbow, Trigger thumb, and Visual disturbances.   Surgical History: has a past surgical history that includes Tubal ligation; Hysterectomy; Shoulder " surgery (Left, 2020);  section; Colonoscopy; Esophagogastroduodenoscopy; Tennis Elbow Release (Left, 11/10/2020); Essure tubal ligation; and Joint replacement (2023).   Family History: family history includes Alzheimer's disease in some other family members; Arthritis in her father; Breast cancer in her maternal grandmother; COPD in her father; Cancer in an other family member; Dementia in her maternal grandmother; Diabetes in her father; Heart disease in her father and another family member; Hyperlipidemia in her father; Hypertension in her father and maternal grandmother; Parkinsonism in her paternal grandfather and another family member; Skin cancer in her mother.   Social History: reports that she quit smoking about 23 months ago. Her smoking use included cigarettes. She started smoking about 6 years ago. She has a 10 pack-year smoking history. She has never used smokeless tobacco. She reports that she does not currently use alcohol. She reports that she does not use drugs.      Current Outpatient Medications:     Atogepant (Qulipta) 60 MG tablet, Take 1 tablet by mouth Daily., Disp: 30 tablet, Rfl: 11    Budesonide (ENTOCORT EC) 3 MG 24 hr capsule, Take 3 capsules by mouth Daily., Disp: , Rfl:     buPROPion XL (WELLBUTRIN XL) 300 MG 24 hr tablet, TAKE 1 TABLET BY MOUTH DAILY, Disp: 90 tablet, Rfl: 1    busPIRone (BUSPAR) 10 MG tablet, Take 1 tablet by mouth Daily., Disp: , Rfl:     Cequa 0.09 % solution, INSTILL 1 DROP IN BOTH EYES TWICE A DAY, Disp: , Rfl:     cyclobenzaprine (FLEXERIL) 10 MG tablet, Take 1 tablet by mouth 2 (Two) Times a Day., Disp: 60 tablet, Rfl: 5    Emgality 120 MG/ML solution prefilled syringe, Inject 1 mL under the skin into the appropriate area as directed Every 30 (Thirty) Days., Disp: 1 mL, Rfl: 11    estradiol 1 MG/GM gel, , Disp: , Rfl:     gabapentin (NEURONTIN) 600 MG tablet, TAKE 2 TABLETS BY MOUTH EVERY MORNING, 1 TABLET AT NOON, AND 2 EVERY EVENING, Disp: 150  tablet, Rfl: 3    hydrocortisone 2.5 % ointment, APPLY A THIN LAYER TO THE AFFECTED AREA(S) ON FACE BY TOPICAL ROUTE 2 TIMES PER DAY FOR UP TO 10 DAYS., Disp: , Rfl:     metroNIDAZOLE (METROCREAM) 0.75 % cream, , Disp: , Rfl:     OnabotulinumtoxinA 200 units reconstituted solution, Inject 200 units IM into head neck shoulders every 12 weeks per FDA protocol Dx G43.719 SHIP TO [facility]., Disp: 1 each, Rfl: 3    ondansetron (ZOFRAN) 4 MG tablet, TAKE 1 TABLET BY MOUTH EVERY 8 HOURS AS NEEDED FOR NAUSEA AND/OR VOMITING, Disp: 18 tablet, Rfl: 1    pantoprazole (PROTONIX) 40 MG EC tablet, TAKE 1 TABLET BY MOUTH DAILY, Disp: 90 tablet, Rfl: 1    QUEtiapine fumarate ER (SEROquel XR) 50 MG tablet sustained-release 24 hour tablet, Take 1 tablet by mouth Every Night., Disp: 90 each, Rfl: 1    SUMAtriptan (IMITREX) 100 MG tablet, TAKE 1 TABLET BY MOUTH AT ONSET OF MIGRAINE; MAY REPEAT 1 TABLET IN 2 HOURS IF NEEDED., Disp: 9 tablet, Rfl: 1    SUMAtriptan Succinate (IMITREX) 6 MG/0.5ML injection, INJECT ONE INJECTOR UNDER THE SKIN AT ONSET OF HEADACHE, MAY REPEAT DOSE ONE TIME IN 1 HOUR IF HEADEACH NOT RELIEVED., Disp: 1 mL, Rfl: 6    Topiramate  MG capsule sustained-release 24 hr, TAKE 1 CAPSULE BY MOUTH DAILY, Disp: 90 capsule, Rfl: 1    ubrogepant (UBRELVY) 50 MG tablet, Take 2 tablets by mouth As Needed (As needed) for up to 1 dose., Disp: 10 tablet, Rfl: 3    vilazodone (VIIBRYD) 20 MG tablet tablet, TAKE 1 TABLET BY MOUTH DAILY, Disp: 90 tablet, Rfl: 0    Zavegepant HCl (Zavzpret) 10 MG/ACT solution, Administer 1 spray in nostril As Needed for migraine. Maximum of one spray (10 mg) per 24 hours., Disp: 6 each, Rfl: 11    Current Facility-Administered Medications:     OnabotulinumtoxinA 155 Units, 155 Units, Intramuscular, Q3 Months, Darrell Del Valle MD, 155 Units at 03/05/25 7335    Allergies: Codeine, Nickel, Penicillins, Morphine, and Morphine and codeine    Physical Exam  Neurological:      Mental Status:  She is alert and oriented to person, place, and time.   Psychiatric:         Mood and Affect: Mood normal.         Behavior: Behavior normal.          Result Review :                   Assessment and Plan    Diagnoses and all orders for this visit:    1. Persistent insomnia (Primary)  Assessment & Plan:  Patient reports that she is sleeping much better and sometimes only takes 1/2 a pill. She prefers this to the Trazodone.         2. Moderate episode of recurrent major depressive disorder  Assessment & Plan:  Patient reports that her mental health is improving with Seroquel helping her sleep seems to have made a big difference in this as well as working through things with the therapist.  She does not want to make any further changes to medication and wants to give this some more time to see how she does but she assured me that if she felt like things were not continuing to go well she would reach out and return to discuss other options.          Follow Up   No follow-ups on file.  Patient was given instructions and counseling regarding her condition or for health maintenance advice. Please see specific information pulled into the AVS if appropriate.     Andreia Vargas PA-C

## 2025-04-03 NOTE — ASSESSMENT & PLAN NOTE
Patient reports that she is sleeping much better and sometimes only takes 1/2 a pill. She prefers this to the Trazodone.

## 2025-04-03 NOTE — ASSESSMENT & PLAN NOTE
Patient reports that her mental health is improving with Seroquel helping her sleep seems to have made a big difference in this as well as working through things with the therapist.  She does not want to make any further changes to medication and wants to give this some more time to see how she does but she assured me that if she felt like things were not continuing to go well she would reach out and return to discuss other options.

## 2025-04-04 ENCOUNTER — SPECIALTY PHARMACY (OUTPATIENT)
Dept: NEUROLOGY | Facility: CLINIC | Age: 50
End: 2025-04-04
Payer: COMMERCIAL

## 2025-04-07 RX ORDER — TRAZODONE HYDROCHLORIDE 100 MG/1
100 TABLET ORAL NIGHTLY
Qty: 90 TABLET | Refills: 1 | OUTPATIENT
Start: 2025-04-07

## 2025-04-07 RX ORDER — BUPROPION HYDROCHLORIDE 300 MG/1
300 TABLET ORAL DAILY
Qty: 90 TABLET | Refills: 0 | Status: SHIPPED | OUTPATIENT
Start: 2025-04-07

## 2025-04-16 RX ORDER — DOXEPIN HYDROCHLORIDE 50 MG/1
50 CAPSULE ORAL NIGHTLY
Qty: 30 CAPSULE | Refills: 1 | Status: SHIPPED | OUTPATIENT
Start: 2025-04-16

## 2025-04-21 ENCOUNTER — PATIENT MESSAGE (OUTPATIENT)
Dept: FAMILY MEDICINE CLINIC | Facility: CLINIC | Age: 50
End: 2025-04-21
Payer: COMMERCIAL

## 2025-04-21 DIAGNOSIS — E66.811 CLASS 1 OBESITY DUE TO EXCESS CALORIES WITHOUT SERIOUS COMORBIDITY WITH BODY MASS INDEX (BMI) OF 30.0 TO 30.9 IN ADULT: Primary | ICD-10-CM

## 2025-04-21 DIAGNOSIS — E66.09 CLASS 1 OBESITY DUE TO EXCESS CALORIES WITHOUT SERIOUS COMORBIDITY WITH BODY MASS INDEX (BMI) OF 30.0 TO 30.9 IN ADULT: Primary | ICD-10-CM

## 2025-04-21 RX ORDER — ONDANSETRON 4 MG/1
TABLET, FILM COATED ORAL
Qty: 18 TABLET | Refills: 1 | Status: SHIPPED | OUTPATIENT
Start: 2025-04-21

## 2025-04-24 ENCOUNTER — TELEPHONE (OUTPATIENT)
Dept: FAMILY MEDICINE CLINIC | Facility: CLINIC | Age: 50
End: 2025-04-24
Payer: COMMERCIAL

## 2025-04-25 ENCOUNTER — SPECIALTY PHARMACY (OUTPATIENT)
Dept: NEUROLOGY | Facility: CLINIC | Age: 50
End: 2025-04-25
Payer: COMMERCIAL

## 2025-04-25 NOTE — PROGRESS NOTES
Specialty Pharmacy Patient Management Program  Refill Outreach     Molly was contacted today regarding refills of their medication(s).    Refill Questions      Flowsheet Row Most Recent Value   Changes to allergies? No   Changes to medications? No   New conditions or infections since last clinic visit No   Unplanned office visit, urgent care, ED, or hospital admission in the last 4 weeks  No   How does patient/caregiver feel medication is working? Good   Financial problems or insurance changes  No   Since the previous refill, were any specialty medication doses or scheduled injections missed or delayed?  No   Does this patient require a clinical escalation to a pharmacist? No            Delivery Questions      Flowsheet Row Most Recent Value   Delivery method UPS   Delivery address verified with patient/caregiver? Yes   Delivery address Home   Number of medications in delivery 3   Medication(s) being filled and delivered Atogepant (Qulipta), Zavegepant HCl (Zavzpret), Galcanezumab-gnlm (Emgality)   Doses left of specialty medications Qulipta 3, Emglaity 4, Zavzpret 4   Copay verified? Yes   Copay amount $0   Copay form of payment No copayment ($0)   Delivery Date Selection 04/26/25   Signature Required No                 Follow-up: 28 day(s)     Al Bearden, Pharmacy Technician  4/25/2025  07:49 EDT

## 2025-05-10 DIAGNOSIS — G43.719 INTRACTABLE CHRONIC MIGRAINE WITHOUT AURA AND WITHOUT STATUS MIGRAINOSUS: ICD-10-CM

## 2025-05-12 RX ORDER — VILAZODONE HYDROCHLORIDE 20 MG/1
20 TABLET ORAL DAILY
Qty: 90 TABLET | Refills: 0 | Status: SHIPPED | OUTPATIENT
Start: 2025-05-12

## 2025-05-15 RX ORDER — SUMATRIPTAN SUCCINATE 100 MG/1
TABLET ORAL
Qty: 9 TABLET | Refills: 1 | Status: SHIPPED | OUTPATIENT
Start: 2025-05-15

## 2025-05-15 NOTE — TELEPHONE ENCOUNTER
Rx Refill Note  Requested Prescriptions     Pending Prescriptions Disp Refills    SUMAtriptan (IMITREX) 100 MG tablet [Pharmacy Med Name: SUMAtriptan SUCC 100 MG TABLET] 9 tablet 1     Sig: TAKE 1 TABLET BY MOUTH AT ONSET OF MIGRAINE; MAY REPEAT 1 TABLET IN 2 HOURS IF NEEDED.      Last filled: 3/20/25 #9 with 1 refill  Last office visit with prescribing clinician: Visit date not found      Next office visit with prescribing clinician: 5/28/2025     Luis Jesus MA  05/15/25, 12:07 EDT

## 2025-05-19 ENCOUNTER — SPECIALTY PHARMACY (OUTPATIENT)
Dept: GENERAL RADIOLOGY | Facility: HOSPITAL | Age: 50
End: 2025-05-19
Payer: COMMERCIAL

## 2025-05-19 RX ORDER — VILAZODONE HYDROCHLORIDE 20 MG/1
20 TABLET ORAL DAILY
Qty: 90 TABLET | Refills: 0 | OUTPATIENT
Start: 2025-05-19

## 2025-05-19 NOTE — PROGRESS NOTES
Specialty Pharmacy Patient Management Program  Neurology Reassessment     Molly Ferguson is a 49 y.o. female with migraines seen by a Neurology provider and enrolled in the Neurology Patient Management program offered by [facility].  A follow-up outreach was conducted, including assessment of continued therapy appropriateness, medication adherence, and side effect incidence and management for Botox, Qulipta, Emgality, and Zavzpret.     Changes to Insurance Coverage or Financial Support  No changes    [facility]  Botox, Qulipta, Emgality and Zavzpret Copay Cards     Relevant Past Medical History and Comorbidities  Relevant medical history and concomitant health conditions were discussed with the patient. The patient's chart has been reviewed for relevant past medical history and comorbid health conditions and updated as necessary.   Past Medical History:   Diagnosis Date    Anxiety     Depression     Depressive disorder     GERD (gastroesophageal reflux disease)     History of anxiety     History of hyperlipidemia     Low back pain     Migraines     Obesity     Patellar malalignment syndrome of left knee     PONV (postoperative nausea and vomiting)     Tennis elbow     Trigger thumb     Visual disturbances      Social History     Socioeconomic History    Marital status:    Tobacco Use    Smoking status: Former     Current packs/day: 0.00     Average packs/day: 1.3 packs/day for 7.5 years (10.0 ttl pk-yrs)     Types: Cigarettes     Start date: 2018     Quit date: 2023     Years since quittin.0    Smokeless tobacco: Never   Vaping Use    Vaping status: Never Used   Substance and Sexual Activity    Alcohol use: Not Currently     Comment: Once every six months or so    Drug use: Never    Sexual activity: Yes     Partners: Male     Birth control/protection: Bilateral salpingectomy      Comment: Hysterectomy     Problem list reviewed by Tahmina Barriga RP on  5/19/2025 at  3:18 PM    Hospitalizations and Urgent Care Since Last Assessment  ED Visits, Admissions, or Hospitalizations: None   Urgent Office Visits: None     Allergies  Known allergies and reactions were discussed with the patient. The patient's chart has been reviewed for allergy information and updated as necessary.   Allergies   Allergen Reactions    Codeine Shortness Of Breath    Nickel Hives    Penicillins Rash    Morphine Rash    Morphine And Codeine GI Intolerance     Allergies reviewed by Tahmina Barriga [facility] on 5/19/2025 at  3:18 PM    Relevant Laboratory Values  Common labs          10/29/2024    10:39   Common Labs   Glucose 82    BUN 14    Creatinine 0.91    Sodium 143    Potassium 3.3    Chloride 105    Calcium 9.6    Albumin 4.3    Total Bilirubin <0.2    Alkaline Phosphatase 66    AST (SGOT) 16    ALT (SGPT) 11    WBC 9.9    Hemoglobin 11.9    Hematocrit 38.0    Platelets 443        Current Medication List  This medication list has been reviewed with the patient and evaluated for any interactions or necessary modifications/recommendations, and updated to include all prescription medications, OTC medications, and supplements the patient is currently taking.  This list reflects what is contained in the patient's profile, which has also been marked as reviewed to communicate to other providers it is the most up to date version of the patient's current medication therapy.     Current Outpatient Medications:     Atogepant (Qulipta) 60 MG tablet, Take 1 tablet by mouth Daily., Disp: 30 tablet, Rfl: 11    Budesonide (ENTOCORT EC) 3 MG 24 hr capsule, Take 3 capsules by mouth Daily., Disp: , Rfl:     buPROPion XL (WELLBUTRIN XL) 300 MG 24 hr tablet, TAKE 1 TABLET BY MOUTH DAILY, Disp: 90 tablet, Rfl: 0    busPIRone (BUSPAR) 10 MG tablet, Take 1 tablet by mouth Daily., Disp: , Rfl:     Cequa 0.09 % solution, INSTILL 1 DROP IN BOTH EYES TWICE A DAY, Disp: , Rfl:     cyclobenzaprine (FLEXERIL) 10 MG  tablet, Take 1 tablet by mouth 2 (Two) Times a Day., Disp: 60 tablet, Rfl: 5    doxepin (SINEquan) 50 MG capsule, Take 1 capsule by mouth Every Night., Disp: 30 capsule, Rfl: 1    Emgality 120 MG/ML solution prefilled syringe, Inject 1 mL under the skin into the appropriate area as directed Every 30 (Thirty) Days., Disp: 1 mL, Rfl: 11    estradiol 1 MG/GM gel, , Disp: , Rfl:     gabapentin (NEURONTIN) 600 MG tablet, TAKE 2 TABLETS BY MOUTH EVERY MORNING, 1 TABLET AT NOON, AND 2 EVERY EVENING, Disp: 150 tablet, Rfl: 3    hydrocortisone 2.5 % ointment, APPLY A THIN LAYER TO THE AFFECTED AREA(S) ON FACE BY TOPICAL ROUTE 2 TIMES PER DAY FOR UP TO 10 DAYS., Disp: , Rfl:     metroNIDAZOLE (METROCREAM) 0.75 % cream, , Disp: , Rfl:     OnabotulinumtoxinA 200 units reconstituted solution, Inject 200 units IM into head neck shoulders every 12 weeks per FDA protocol Dx G43.719 SHIP TO ASHLYSJENNIFER HOLDEN., Disp: 1 each, Rfl: 3    ondansetron (ZOFRAN) 4 MG tablet, TAKE ONE TABLET BY MOUTH EVERY 8 HOURS AS NEEDED FOR NAUSEA AND/OR VOMITING, Disp: 18 tablet, Rfl: 1    pantoprazole (PROTONIX) 40 MG EC tablet, TAKE 1 TABLET BY MOUTH DAILY, Disp: 90 tablet, Rfl: 1    SUMAtriptan (IMITREX) 100 MG tablet, TAKE 1 TABLET BY MOUTH AT ONSET OF MIGRAINE; MAY REPEAT 1 TABLET IN 2 HOURS IF NEEDED. MAX 2 TABLETS PER 24 HOURS., Disp: 9 tablet, Rfl: 1    SUMAtriptan Succinate (IMITREX) 6 MG/0.5ML injection, INJECT ONE INJECTOR UNDER THE SKIN AT ONSET OF HEADACHE, MAY REPEAT DOSE ONE TIME IN 1 HOUR IF HEADEACH NOT RELIEVED., Disp: 1 mL, Rfl: 6    Tirzepatide-Weight Management (ZEPBOUND) 5 MG/0.5ML solution auto-injector, Inject 0.5 mL under the skin into the appropriate area as directed 1 (One) Time Per Week., Disp: 2 mL, Rfl: 2    Topiramate  MG capsule sustained-release 24 hr, TAKE 1 CAPSULE BY MOUTH DAILY, Disp: 90 capsule, Rfl: 1    ubrogepant (UBRELVY) 50 MG tablet, Take 2 tablets by mouth As Needed (As needed) for up to 1 dose., Disp:  10 tablet, Rfl: 3    vilazodone (VIIBRYD) 20 MG tablet tablet, TAKE 1 TABLET BY MOUTH DAILY, Disp: 90 tablet, Rfl: 0    Zavegepant HCl (Zavzpret) 10 MG/ACT solution, Administer 1 spray in nostril As Needed for migraine. Maximum of one spray (10 mg) per 24 hours., Disp: 6 each, Rfl: 11    Current Facility-Administered Medications:     OnabotulinumtoxinA 155 Units, 155 Units, Intramuscular, Q3 Months, Darrell Del Valle MD, 155 Units at 03/05/25 1645    Medicines reviewed by Tahmina Barriga [facility] on 5/19/2025 at  3:18 PM    Drug Interactions  None     Adverse Drug Reactions  Medication tolerability: Tolerating with no to minimal ADRs          Medication plan: Continue therapy with normal follow-up  Plan for ADR Management: Not required      Adherence, Self-Administration, and Current Therapy Problems  Adherence related patient's specialty therapy was discussed with the patient. The Adherence segment of this outreach has been reviewed and updated.   Adherence Questions  Linked Medication(s) Assessed: Atogepant (Qulipta), Galcanezumab-gnlm (Emgality), OnabotulinumtoxinA, Zavegepant HCl (Zavzpret)  On average, how many doses/injections does the patient miss per month?: 0  What are the identified reasons for non-adherence or missed doses? : no problems identified  What is the estimated medication adherence level?: % (Zavzpret - PRN)  Based on the patient/caregiver response and refill history, does this patient require an MTP to track adherence improvements?: no    Additional Barriers to Patient Self-Administration: None  Methods for Supporting Patient Self-Administration: Not required    Recently Close Medication Therapy Problems  No medication therapy recommendations to display  Open Medication Therapy Problems  No medication therapy recommendations to display     Goals of Therapy  Goals related to the patient's specialty therapy was discussed with the patient. The Patient Goals segment of this outreach has been  reviewed and updated.    Goals Addressed Today        Specialty Pharmacy General Goal      On Average, Reduce:   Frequency of migraines to 6-7 per month.   Symptom severity by 50% within 1 hours of taking acute therapy.   Duration of migraines to 2 hours.     Baseline Values/Notes on Enrollment  Frequency: 12-14 MMD  Symptom Severity: Moderate to severe pain,   Duration: Several hours    Date of Reassessment Notes on Progress Toward Above Goals   11/26/24 4-5 MMD                                                        Quality of Life Assessment   Quality of Life related to the patient's enrollment in the patient management program and services provided was discussed with the patient. The QOL segment of this outreach has been reviewed and updated.   Quality of Life Improvement Scale: 9-A good deal better    Reassessment Plan & Follow-Up  Medication Therapy Changes: Continue Botox 155 units IM to be administered by Provider in office every 3 months, Qulipta 60 mg PO daily, Emgality 120 mg subcutaneously monthly, and Zavzpret 1 spray (10 mg) in 1 nostril as needed for migraine as a single dose. Do not use more than 1 single spray in a 24 hour period.  Related Plans, Therapy Recommendations, or Issues to Be Addressed: None  Pharmacist to perform regular reassessments no more than (6) months from the previous assessment.  Care Coordinator to set up future refill outreaches, coordinate prescription delivery, and escalate clinical questions to pharmacist.     Attestation  Therapeutic appropriateness: Appropriate  I attest the patient was actively involved in and has agreed to the above plan of care. If the prescribed therapy is at any point deemed not appropriate based on the current or future assessments, a consultation will be initiated with the patient's specialty care provider to determine the best course of action. The revised plan of therapy will be documented along with any additional patient education provided.  Discussed aforementioned material with patient by phone.    Tahmina Barriga, PharmD, [facility]  Clinic Specialty Pharmacist, Neurology  5/19/2025  15:18 EDT

## 2025-05-19 NOTE — PROGRESS NOTES
Specialty Pharmacy Patient Management Program  Refill Outreach     Molly was contacted today regarding refills of their medication(s).    Refill Questions      Flowsheet Row Most Recent Value   Changes to allergies? No   Changes to medications? No   New conditions or infections since last clinic visit No   Unplanned office visit, urgent care, ED, or hospital admission in the last 4 weeks  No   How does patient/caregiver feel medication is working? Very good   Financial problems or insurance changes  No   Since the previous refill, were any specialty medication doses or scheduled injections missed or delayed?  No   Does this patient require a clinical escalation to a pharmacist? No            Delivery Questions      Flowsheet Row Most Recent Value   Delivery method UPS   Delivery address verified with patient/caregiver? Yes   Delivery address Home   Number of medications in delivery 1   Medication(s) being filled and delivered OnabotulinumtoxinA   Doses left of specialty medications 0   Copay verified? Yes   Copay amount $0   Copay form of payment No copayment ($0)   Delivery Date Selection 05/21/25   Signature Required Yes                 Follow-up: 84 day(s)     Tahmina Barriga [facility]  5/19/2025  15:16 EDT

## 2025-05-21 ENCOUNTER — SPECIALTY PHARMACY (OUTPATIENT)
Dept: NEUROLOGY | Facility: CLINIC | Age: 50
End: 2025-05-21
Payer: COMMERCIAL

## 2025-05-21 NOTE — PROGRESS NOTES
Specialty Pharmacy Patient Management Program  Refill Outreach     Molly was contacted today regarding refills of their medication(s).    Refill Questions      Flowsheet Row Most Recent Value   Changes to allergies? No   Changes to medications? No   New conditions or infections since last clinic visit No   Unplanned office visit, urgent care, ED, or hospital admission in the last 4 weeks  No   How does patient/caregiver feel medication is working? Good   Financial problems or insurance changes  No   Since the previous refill, were any specialty medication doses or scheduled injections missed or delayed?  No   Does this patient require a clinical escalation to a pharmacist? No            Delivery Questions      Flowsheet Row Most Recent Value   Delivery method UPS   Delivery address verified with patient/caregiver? Yes   Delivery address Home   Other address preferred n/a   Number of medications in delivery 3   Medication(s) being filled and delivered Zavegepant HCl (Zavzpret), Atogepant (Qulipta), Galcanezumab-gnlm (Emgality)   Doses left of specialty medications n/a   Copay verified? Yes   Copay amount Qulipta, Emgality and Zavzpret = $0   Copay form of payment No copayment ($0)   Delivery Date Selection 05/22/25   Signature Required No   Do you consent to receive electronic handouts?  Yes                 Follow-up: 30 day(s)     Carter Bethea  5/21/2025  09:02 EDT

## 2025-05-28 ENCOUNTER — PROCEDURE VISIT (OUTPATIENT)
Dept: NEUROLOGY | Facility: CLINIC | Age: 50
End: 2025-05-28
Payer: COMMERCIAL

## 2025-05-28 DIAGNOSIS — G43.719 INTRACTABLE CHRONIC MIGRAINE WITHOUT AURA AND WITHOUT STATUS MIGRAINOSUS: Primary | ICD-10-CM

## 2025-05-28 NOTE — PROGRESS NOTES
CC:  Migraines.    HPI : Patient is in clinic for Botox injection.  Current headache frequency is approximately 6-8 headache days in a month with some headaches lasting for more than 4 hours.      Botox Procedure Note    Current headache frequency: 6-8 headaches days / month.    The risks and benefits were discussed with the patient. The patient was given the opportunity to ask questions. Informed consent form was signed.    Onabotulinumtoxin A was reconstituted with 0.9% normal saline. All injections sites were prepped with alcohol swab. Approximately 5 units of botox were injected into each of the following sites bilaterally as per routine migraine botox injection PREEMPT protocol: , procerus, frontalis, temporalis, occipitalis, upper cervical paraspinals, and trapezius.    The patient tolerated the procedure well. There were no immediate complications. The patient will follow up in approximately 12 weeks for her next injection.    Total amount of onabotulinumtoxin A injected was 155 units with 45 units wasted.    Note: With Qulipta 60 mg daily, Emgality once a month injection and Botox, migraine frequency and intensity remain stable.  She is using Ubrelvy alternating with Zavzpret nasal spray and is working well.  I will see her back in 12 weeks for next injection.

## 2025-06-05 RX ORDER — METHYLPREDNISOLONE 4 MG/1
TABLET ORAL
Qty: 21 TABLET | Refills: 0 | Status: SHIPPED | OUTPATIENT
Start: 2025-06-05

## 2025-06-11 ENCOUNTER — OFFICE VISIT (OUTPATIENT)
Dept: FAMILY MEDICINE CLINIC | Facility: CLINIC | Age: 50
End: 2025-06-11
Payer: COMMERCIAL

## 2025-06-11 VITALS
WEIGHT: 133.5 LBS | BODY MASS INDEX: 26.21 KG/M2 | HEIGHT: 60 IN | OXYGEN SATURATION: 98 % | HEART RATE: 87 BPM | SYSTOLIC BLOOD PRESSURE: 110 MMHG | DIASTOLIC BLOOD PRESSURE: 72 MMHG

## 2025-06-11 DIAGNOSIS — E66.3 OVERWEIGHT (BMI 25.0-29.9): ICD-10-CM

## 2025-06-11 DIAGNOSIS — G47.00 PERSISTENT INSOMNIA: Primary | ICD-10-CM

## 2025-06-11 RX ORDER — TRAZODONE HYDROCHLORIDE 100 MG/1
TABLET ORAL
Qty: 90 TABLET | Refills: 3 | Status: SHIPPED | OUTPATIENT
Start: 2025-06-11

## 2025-06-11 RX ORDER — DOXEPIN HYDROCHLORIDE 50 MG/1
CAPSULE ORAL
Qty: 30 CAPSULE | Refills: 1 | OUTPATIENT
Start: 2025-06-11

## 2025-06-11 NOTE — ASSESSMENT & PLAN NOTE
Patient's (Body mass index is 26.07 kg/m².) indicates that they are overweight with health conditions that include GERD and osteoarthritis . Weight is improving with treatment. BMI is above average; BMI management plan is completed. We discussed low calorie, low carb based diet program, portion control, increasing exercise, and pharmacologic options including she has been successful in reducing her weight with the use of Zepbound she is ready to increase her dose in the dose has been sent to her pharmacy.

## 2025-06-11 NOTE — ASSESSMENT & PLAN NOTE
She is going to resume the trazodone at 100 mg tablet she can take 1-1/2 to 2 tablets nightly and she will let me know how that does.

## 2025-06-11 NOTE — PROGRESS NOTES
"Chief Complaint  Follow-up (Weight loss follow up, sleep med)    Subjective          Molly Ferguson presents to [facility]  History of Present Illness Weight loss is doing ok, but she is ready to increase her dose of Zepbound.  She also notes that the doxepin is not helping with sleep and she would like to try to go back on the trazodone it seemed to work the best for her but she would like to have the 100 mg tablets and be able to take up to 2 of them at a time.    Objective   Vital Signs:   /72 (BP Location: Left arm, Patient Position: Sitting, Cuff Size: Large Adult)   Pulse 87   Ht 152.4 cm (60\")   Wt 60.6 kg (133 lb 8 oz)   SpO2 98%   BMI 26.07 kg/m²     Body mass index is 26.07 kg/m².    Review of Systems   Constitutional:  Negative for chills, fatigue and fever.   Respiratory:  Negative for cough and shortness of breath.    Cardiovascular:  Negative for chest pain and palpitations.   Musculoskeletal:  Negative for back pain and myalgias.   Neurological:  Negative for dizziness and headache.   Psychiatric/Behavioral:  Negative for depressed mood. The patient is not nervous/anxious.        Past History:  Medical History: has a past medical history of Anxiety, Arthritis, Depression, Depressive disorder, GERD (gastroesophageal reflux disease), History of anxiety, History of hyperlipidemia, Inflammatory bowel disease, Low back pain, Lumbosacral disc disease, Migraines, Obesity, Patellar malalignment syndrome of left knee, PONV (postoperative nausea and vomiting), Tennis elbow, Trigger thumb, and Visual disturbances.   Surgical History: has a past surgical history that includes Tubal ligation; Hysterectomy; Shoulder surgery (Left, 2020);  section; Colonoscopy (2025); Esophagogastroduodenoscopy; Tennis Elbow Release (Left, 11/10/2020); Essure tubal ligation; Joint replacement (2023); Breast surgery (2024); and Epidural block injection.   Family " History: family history includes Alzheimer's disease in some other family members; Arthritis in her father; Breast cancer in her maternal grandmother; COPD in her father; Cancer in an other family member; Dementia in her maternal grandmother; Diabetes in her father; Hearing loss in her father; Heart disease in her father and another family member; Hyperlipidemia in her father; Hypertension in her father and maternal grandmother; Parkinsonism in her paternal grandfather and another family member; Skin cancer in her mother.   Social History: reports that she quit smoking about 2 years ago. Her smoking use included cigarettes. She started smoking about 7 years ago. She has a 10 pack-year smoking history. She has never used smokeless tobacco. She reports that she does not currently use alcohol. She reports that she does not use drugs.      Current Outpatient Medications:     Atogepant (Qulipta) 60 MG tablet, Take 1 tablet by mouth Daily., Disp: 30 tablet, Rfl: 11    Budesonide (ENTOCORT EC) 3 MG 24 hr capsule, Take 3 capsules by mouth Daily., Disp: , Rfl:     buPROPion XL (WELLBUTRIN XL) 300 MG 24 hr tablet, TAKE 1 TABLET BY MOUTH DAILY, Disp: 90 tablet, Rfl: 0    busPIRone (BUSPAR) 10 MG tablet, Take 1 tablet by mouth Daily., Disp: , Rfl:     Cequa 0.09 % solution, INSTILL 1 DROP IN BOTH EYES TWICE A DAY, Disp: , Rfl:     cyclobenzaprine (FLEXERIL) 10 MG tablet, Take 1 tablet by mouth 2 (Two) Times a Day., Disp: 60 tablet, Rfl: 5    Emgality 120 MG/ML solution prefilled syringe, Inject 1 mL under the skin into the appropriate area as directed Every 30 (Thirty) Days., Disp: 1 mL, Rfl: 11    estradiol 1 MG/GM gel, , Disp: , Rfl:     gabapentin (NEURONTIN) 600 MG tablet, TAKE 2 TABLETS BY MOUTH EVERY MORNING, 1 TABLET AT NOON, AND 2 EVERY EVENING, Disp: 150 tablet, Rfl: 3    hydrocortisone 2.5 % ointment, APPLY A THIN LAYER TO THE AFFECTED AREA(S) ON FACE BY TOPICAL ROUTE 2 TIMES PER DAY FOR UP TO 10 DAYS., Disp: , Rfl:      metroNIDAZOLE (METROCREAM) 0.75 % cream, , Disp: , Rfl:     OnabotulinumtoxinA 200 units reconstituted solution, Inject 200 units IM into head neck shoulders every 12 weeks per FDA protocol Dx G43.719 SHIP TO SANDRITA HOLDEN., Disp: 1 each, Rfl: 3    ondansetron (ZOFRAN) 4 MG tablet, TAKE ONE TABLET BY MOUTH EVERY 8 HOURS AS NEEDED FOR NAUSEA AND/OR VOMITING, Disp: 18 tablet, Rfl: 1    pantoprazole (PROTONIX) 40 MG EC tablet, TAKE 1 TABLET BY MOUTH DAILY, Disp: 90 tablet, Rfl: 1    SUMAtriptan (IMITREX) 100 MG tablet, TAKE 1 TABLET BY MOUTH AT ONSET OF MIGRAINE; MAY REPEAT 1 TABLET IN 2 HOURS IF NEEDED. MAX 2 TABLETS PER 24 HOURS., Disp: 9 tablet, Rfl: 1    SUMAtriptan Succinate (IMITREX) 6 MG/0.5ML injection, INJECT ONE INJECTOR UNDER THE SKIN AT ONSET OF HEADACHE, MAY REPEAT DOSE ONE TIME IN 1 HOUR IF HEADEACH NOT RELIEVED., Disp: 1 mL, Rfl: 6    Topiramate  MG capsule sustained-release 24 hr, TAKE 1 CAPSULE BY MOUTH DAILY, Disp: 90 capsule, Rfl: 1    ubrogepant (UBRELVY) 50 MG tablet, Take 2 tablets by mouth As Needed (As needed) for up to 1 dose., Disp: 10 tablet, Rfl: 3    vilazodone (VIIBRYD) 20 MG tablet tablet, TAKE 1 TABLET BY MOUTH DAILY, Disp: 90 tablet, Rfl: 0    Zavegepant HCl (Zavzpret) 10 MG/ACT solution, Administer 1 spray in nostril As Needed for migraine. Maximum of one spray (10 mg) per 24 hours., Disp: 6 each, Rfl: 11    Tirzepatide-Weight Management (ZEPBOUND) 7.5 MG/0.5ML solution auto-injector, Inject 0.5 mL under the skin into the appropriate area as directed 1 (One) Time Per Week., Disp: 2 mL, Rfl: 5    traZODone (DESYREL) 100 MG tablet, Take 1 or 2 at bedtime for sleep, Disp: 90 tablet, Rfl: 3    Current Facility-Administered Medications:     OnabotulinumtoxinA 155 Units, 155 Units, Intramuscular, Q3 Months, Darrell Del Valle MD, 155 Units at 05/28/25 8905  Allergies: Codeine, Nickel, Penicillins, Morphine, and Morphine and codeine    Physical Exam  Constitutional:       Appearance:  Normal appearance.   Neurological:      Mental Status: She is alert and oriented to person, place, and time.   Psychiatric:         Mood and Affect: Mood normal.         Behavior: Behavior normal.             Assessment and Plan   Diagnoses and all orders for this visit:    1. Persistent insomnia (Primary)  Assessment & Plan:  She is going to resume the trazodone at 100 mg tablet she can take 1-1/2 to 2 tablets nightly and she will let me know how that does.      2. Overweight (BMI 25.0-29.9)  Assessment & Plan:  Patient's (Body mass index is 26.07 kg/m².) indicates that they are overweight with health conditions that include GERD and osteoarthritis . Weight is improving with treatment. BMI is above average; BMI management plan is completed. We discussed low calorie, low carb based diet program, portion control, increasing exercise, and pharmacologic options including she has been successful in reducing her weight with the use of Zepbound she is ready to increase her dose in the dose has been sent to her pharmacy.       Other orders  -     traZODone (DESYREL) 100 MG tablet; Take 1 or 2 at bedtime for sleep  Dispense: 90 tablet; Refill: 3  -     Tirzepatide-Weight Management (ZEPBOUND) 7.5 MG/0.5ML solution auto-injector; Inject 0.5 mL under the skin into the appropriate area as directed 1 (One) Time Per Week.  Dispense: 2 mL; Refill: 5            Follow Up   Return in about 6 months (around 12/11/2025).  Patient was given instructions and counseling regarding her condition or for health maintenance advice. Please see specific information pulled into the AVS if appropriate.       Andreia Vargas PA-C

## 2025-06-16 ENCOUNTER — SPECIALTY PHARMACY (OUTPATIENT)
Dept: NEUROLOGY | Facility: CLINIC | Age: 50
End: 2025-06-16
Payer: COMMERCIAL

## 2025-06-16 NOTE — PROGRESS NOTES
Specialty Pharmacy Patient Management Program  Refill Outreach     Molly was contacted today regarding refills of their medication(s).    Refill Questions      Flowsheet Row Most Recent Value   Changes to allergies? No   Changes to medications? Yes  [Patient states she has discontinued Doxepin and was prescribed Trazodone 150mg to 200mg at bedtime.]   New conditions or infections since last clinic visit No   Unplanned office visit, urgent care, ED, or hospital admission in the last 4 weeks  No   How does patient/caregiver feel medication is working? Good   Financial problems or insurance changes  No   Since the previous refill, were any specialty medication doses or scheduled injections missed or delayed?  No   Does this patient require a clinical escalation to a pharmacist? Yes  [Patient states she hasdis continued Doxepin and was prescribed Trazodone 150mg to 200mg at bedtime.]            Delivery Questions      Flowsheet Row Most Recent Value   Delivery method UPS   Delivery address verified with patient/caregiver? Yes   Delivery address Home   Other address preferred n/a   Number of medications in delivery 3   Medication(s) being filled and delivered OnabotulinumtoxinA, Atogepant (Qulipta), Galcanezumab-gnlm (Emgality)   Doses left of specialty medications Qulipta has 6 tablets left.   Copay verified? Yes   Copay amount Emgality, Qulipta and Zavzpret = $0   Copay form of payment No copayment ($0)   Delivery Date Selection 06/17/25   Signature Required No   Do you consent to receive electronic handouts?  Yes                 Follow-up: 30 day(s)     Carter Bethea  6/16/2025  08:47 EDT

## 2025-06-17 ENCOUNTER — SPECIALTY PHARMACY (OUTPATIENT)
Dept: NEUROLOGY | Facility: CLINIC | Age: 50
End: 2025-06-17
Payer: COMMERCIAL

## 2025-06-26 ENCOUNTER — HOSPITAL ENCOUNTER (OUTPATIENT)
Dept: ONCOLOGY | Facility: HOSPITAL | Age: 50
Discharge: HOME | End: 2025-06-26
Admitting: COLON & RECTAL SURGERY
Payer: COMMERCIAL

## 2025-06-26 VITALS
RESPIRATION RATE: 16 BRPM | DIASTOLIC BLOOD PRESSURE: 67 MMHG | BODY MASS INDEX: 24.94 KG/M2 | SYSTOLIC BLOOD PRESSURE: 105 MMHG | HEIGHT: 60 IN | HEART RATE: 95 BPM | WEIGHT: 127 LBS | TEMPERATURE: 97.6 F

## 2025-06-26 DIAGNOSIS — E86.0 DEHYDRATION: Primary | ICD-10-CM

## 2025-06-26 PROCEDURE — 25810000003 SODIUM CHLORIDE 0.9 % SOLUTION: Performed by: COLON & RECTAL SURGERY

## 2025-06-26 PROCEDURE — 96360 HYDRATION IV INFUSION INIT: CPT

## 2025-06-26 RX ORDER — SODIUM CHLORIDE 9 MG/ML
1000 INJECTION, SOLUTION INTRAVENOUS ONCE
OUTPATIENT
Start: 2025-06-27

## 2025-06-26 RX ORDER — SODIUM CHLORIDE 9 MG/ML
1000 INJECTION, SOLUTION INTRAVENOUS ONCE
Status: COMPLETED | OUTPATIENT
Start: 2025-06-26 | End: 2025-06-26

## 2025-06-26 RX ADMIN — SODIUM CHLORIDE 999 ML/HR: 9 INJECTION, SOLUTION INTRAVENOUS at 14:47

## 2025-07-07 DIAGNOSIS — G43.719 INTRACTABLE CHRONIC MIGRAINE WITHOUT AURA AND WITHOUT STATUS MIGRAINOSUS: ICD-10-CM

## 2025-07-07 RX ORDER — BUPROPION HYDROCHLORIDE 300 MG/1
300 TABLET ORAL DAILY
Qty: 90 TABLET | Refills: 0 | Status: SHIPPED | OUTPATIENT
Start: 2025-07-07

## 2025-07-08 RX ORDER — TOPIRAMATE 200 MG/1
1 CAPSULE, EXTENDED RELEASE ORAL DAILY
Qty: 90 CAPSULE | Refills: 1 | Status: SHIPPED | OUTPATIENT
Start: 2025-07-08

## 2025-07-08 NOTE — TELEPHONE ENCOUNTER
Rx Refill Note  Requested Prescriptions     Pending Prescriptions Disp Refills    Topiramate  MG capsule sustained-release 24 hr [Pharmacy Med Name: TOPIRAMATE  MG CAPSULE] 90 capsule 1     Sig: TAKE 1 CAPSULE BY MOUTH DAILY      Last filled: 12/30/2024 90 days, 1 refill Sent In  Last office visit with prescribing clinician: 5/28/2025   Next office visit with prescribing clinician: 08/20/2025    Monica Glass RN  07/08/25, 09:28 EDT

## 2025-07-10 RX ORDER — BUSPIRONE HYDROCHLORIDE 10 MG/1
10 TABLET ORAL DAILY
OUTPATIENT
Start: 2025-07-10

## 2025-07-10 RX ORDER — SUMATRIPTAN SUCCINATE 100 MG/1
TABLET ORAL
Qty: 9 TABLET | Refills: 1 | Status: SHIPPED | OUTPATIENT
Start: 2025-07-10

## 2025-07-10 NOTE — TELEPHONE ENCOUNTER
Rx Refill Note  Requested Prescriptions     Pending Prescriptions Disp Refills    SUMAtriptan (IMITREX) 100 MG tablet [Pharmacy Med Name: SUMAtriptan SUCC 100 MG TABLET] 9 tablet 1     Sig: TAKE 1 TABLET BY MOUTH AT ONSET OF MIGRAINE; MAY REPEAT 1 TABLET IN 2 HOURS IF NEEDED. *MAX OF 2 TABLETS PER 24 HOURS      Last filled: 05/15/2025 9 pills, 1 refill  Sent In  Last office visit with prescribing clinician: 05/28/2025  Next office visit with prescribing clinician: 8/20/2025     Monica Glass RN  07/10/25, 07:27 EDT

## 2025-07-11 ENCOUNTER — SPECIALTY PHARMACY (OUTPATIENT)
Dept: NEUROLOGY | Facility: CLINIC | Age: 50
End: 2025-07-11
Payer: COMMERCIAL

## 2025-07-11 NOTE — PROGRESS NOTES
Specialty Pharmacy Patient Management Program  Refill Outreach     Molly was contacted today regarding refills of their medication(s).    Refill Questions      Flowsheet Row Most Recent Value   Changes to allergies? No   Changes to medications? No   New conditions or infections since last clinic visit No   Unplanned office visit, urgent care, ED, or hospital admission in the last 4 weeks  No   How does patient/caregiver feel medication is working? Good   Financial problems or insurance changes  No   Since the previous refill, were any specialty medication doses or scheduled injections missed or delayed?  No   Does this patient require a clinical escalation to a pharmacist? No            Delivery Questions      Flowsheet Row Most Recent Value   Delivery method UPS   Delivery address verified with patient/caregiver? Yes   Delivery address Home   Number of medications in delivery 3   Medication(s) being filled and delivered Atogepant (Qulipta), Galcanezumab-gnlm (Emgality), Zavegepant HCl (Zavzpret)   Doses left of specialty medications Qulipta 4, Emgality 4, Zavzpret 5   Copay verified? Yes   Copay amount $0   Copay form of payment No copayment ($0)   Delivery Date Selection 07/14/25   Signature Required No                 Follow-up: 28 day(s)     Al Bearden, Pharmacy Technician  7/11/2025  10:39 EDT    
84

## 2025-07-31 ENCOUNTER — SPECIALTY PHARMACY (OUTPATIENT)
Dept: NEUROLOGY | Facility: CLINIC | Age: 50
End: 2025-07-31
Payer: COMMERCIAL

## 2025-07-31 NOTE — PROGRESS NOTES
Specialty Pharmacy Patient Management Program  Refill Outreach     Molly was contacted today regarding refills of their medication(s).    Refill Questions      Flowsheet Row Most Recent Value   Changes to allergies? No   Changes to medications? No   New conditions or infections since last clinic visit No   Unplanned office visit, urgent care, ED, or hospital admission in the last 4 weeks  No   How does patient/caregiver feel medication is working? Good   Financial problems or insurance changes  No   Since the previous refill, were any specialty medication doses or scheduled injections missed or delayed?  No   Does this patient require a clinical escalation to a pharmacist? No            Delivery Questions      Flowsheet Row Most Recent Value   Delivery method UPS   Delivery address verified with patient/caregiver? Yes   Delivery address [facility]   Number of medications in delivery 1   Medication(s) being filled and delivered OnabotulinumtoxinA   Doses left of specialty medications 0   Copay verified? Yes   Copay amount $0   Copay form of payment No copayment ($0)   Delivery Date Selection 08/01/25   Signature Required No                 Follow-up: 28 day(s)     Al Bearden, Pharmacy Technician  7/31/2025  09:56 EDT

## 2025-08-03 DIAGNOSIS — G43.719 INTRACTABLE CHRONIC MIGRAINE WITHOUT AURA AND WITHOUT STATUS MIGRAINOSUS: ICD-10-CM

## 2025-08-04 RX ORDER — GABAPENTIN 600 MG/1
TABLET ORAL
Qty: 150 TABLET | Refills: 0 | Status: SHIPPED | OUTPATIENT
Start: 2025-08-04

## 2025-08-06 ENCOUNTER — SPECIALTY PHARMACY (OUTPATIENT)
Dept: NEUROLOGY | Facility: CLINIC | Age: 50
End: 2025-08-06
Payer: COMMERCIAL

## 2025-08-08 RX ORDER — VILAZODONE HYDROCHLORIDE 20 MG/1
20 TABLET ORAL DAILY
Qty: 90 TABLET | Refills: 0 | Status: SHIPPED | OUTPATIENT
Start: 2025-08-08

## 2025-08-14 ENCOUNTER — TELEPHONE (OUTPATIENT)
Dept: NEUROLOGY | Facility: CLINIC | Age: 50
End: 2025-08-14
Payer: COMMERCIAL

## (undated) DEVICE — KT INST FOR FIBERTAKDX SUT/ANCH W/GW 1.6MM 1.5MM DISP

## (undated) DEVICE — 450 ML BOTTLE OF 0.05% CHLORHEXIDINE GLUCONATE IN 99.95% STERILE WATER FOR IRRIGATION, USP AND APPLICATOR.: Brand: IRRISEPT ANTIMICROBIAL WOUND LAVAGE

## (undated) DEVICE — Device

## (undated) DEVICE — ANTIBACTERIAL UNDYED BRAIDED (POLYGLACTIN 910), SYNTHETIC ABSORBABLE SUTURE: Brand: COATED VICRYL

## (undated) DEVICE — DRAPE,U/ SHT,SPLIT,PLAS,STERIL: Brand: MEDLINE

## (undated) DEVICE — ISO/ALC 70PCT 16OZ

## (undated) DEVICE — STERILE PVP: Brand: MEDLINE INDUSTRIES, INC.

## (undated) DEVICE — DRSNG SURG AQUACEL AG 9X15CM

## (undated) DEVICE — GLV SURG SENSICARE PI MIC PF SZ7.5 LF STRL

## (undated) DEVICE — DISPOSABLE TOURNIQUET CUFF SINGLE BLADDER, SINGLE PORT AND QUICK CONNECT CONNECTOR: Brand: COLOR CUFF

## (undated) DEVICE — GLV SURG SENSICARE PI ORTHO SZ7.5 LF STRL

## (undated) DEVICE — PK EXTREM UPPR 10

## (undated) DEVICE — MARKER,SKIN,W/RULER,DUAL,STOP: Brand: MEDLINE

## (undated) DEVICE — DRP C/ARM MINI

## (undated) DEVICE — SUT ETHLN 3/0 PC5 18IN 1893G

## (undated) DEVICE — BNDG ELAS CO-FLEX SLF ADHR 4IN5YD LF STRL

## (undated) DEVICE — SUT VIC 2/0 CT2 27IN J269H

## (undated) DEVICE — DRSNG PAD ABD 8X10IN STRL

## (undated) DEVICE — UNDERCAST PADDING: Brand: DEROYAL

## (undated) DEVICE — CVR HNDL LIGHT RIGID

## (undated) DEVICE — PRECISION THIN (7.0 X 0.38 X 29.5MM)

## (undated) DEVICE — BNDG ELAS ELITE V/CLOSE 4IN 5YD LF STRL

## (undated) DEVICE — 3M™ IOBAN™ 2 ANTIMICROBIAL INCISE DRAPE 6650EZ: Brand: IOBAN™ 2

## (undated) DEVICE — UNDERGLV SURG BIOGEL INDICAT PI SZ8 BLU